# Patient Record
Sex: MALE | Race: WHITE | NOT HISPANIC OR LATINO | Employment: FULL TIME | URBAN - METROPOLITAN AREA
[De-identification: names, ages, dates, MRNs, and addresses within clinical notes are randomized per-mention and may not be internally consistent; named-entity substitution may affect disease eponyms.]

---

## 2017-01-10 ASSESSMENT — ENCOUNTER SYMPTOMS
BOWEL INCONTINENCE: 0
NIGHT SWEATS: 0
INSOMNIA: 1
DIARRHEA: 1
LOSS OF CONSCIOUSNESS: 0
BLOATING: 0
ARTHRALGIAS: 1
TINGLING: 1
JOINT SWELLING: 0
HEARTBURN: 0
NAIL CHANGES: 0
TREMORS: 1
NECK MASS: 0
DYSURIA: 0
DIFFICULTY URINATING: 0
POLYPHAGIA: 0
NUMBNESS: 1
MYALGIAS: 1
NAUSEA: 0
SKIN CHANGES: 0
SINUS CONGESTION: 1
POOR WOUND HEALING: 0
SINUS PAIN: 1
JAUNDICE: 0
WEIGHT GAIN: 0
VOMITING: 0
ABDOMINAL PAIN: 0
DECREASED APPETITE: 0
CONSTIPATION: 0
TASTE DISTURBANCE: 0
HEMATURIA: 0
CHILLS: 0
SEIZURES: 0
WEAKNESS: 1
RECTAL PAIN: 0
PARALYSIS: 0
MUSCLE WEAKNESS: 1
SMELL DISTURBANCE: 0
SORE THROAT: 0
DISTURBANCES IN COORDINATION: 1
DECREASED CONCENTRATION: 1
WEIGHT LOSS: 0
NERVOUS/ANXIOUS: 1
FLANK PAIN: 0
MEMORY LOSS: 0
DEPRESSION: 0
HEADACHES: 0
HALLUCINATIONS: 0
ALTERED TEMPERATURE REGULATION: 1
SPEECH CHANGE: 0
POLYDIPSIA: 0
PANIC: 0
DIZZINESS: 1
FEVER: 0
RECTAL BLEEDING: 0
BLOOD IN STOOL: 0
STIFFNESS: 1
BACK PAIN: 1
FATIGUE: 1
TROUBLE SWALLOWING: 0
INCREASED ENERGY: 0
HOARSE VOICE: 1
MUSCLE CRAMPS: 0

## 2017-01-16 ENCOUNTER — RESULTS ONLY (OUTPATIENT)
Dept: OTHER | Facility: CLINIC | Age: 47
End: 2017-01-16

## 2017-01-16 ENCOUNTER — APPOINTMENT (OUTPATIENT)
Dept: LAB | Facility: CLINIC | Age: 47
End: 2017-01-16
Attending: TRANSPLANT SURGERY
Payer: COMMERCIAL

## 2017-01-16 DIAGNOSIS — Z94.4 LIVER REPLACED BY TRANSPLANT (H): ICD-10-CM

## 2017-01-16 DIAGNOSIS — Z79.899 HIGH RISK MEDICATIONS (NOT ANTICOAGULANTS) LONG-TERM USE: ICD-10-CM

## 2017-01-16 DIAGNOSIS — Z13.220 LIPID SCREENING: ICD-10-CM

## 2017-01-16 DIAGNOSIS — D84.9 IMMUNOSUPPRESSED STATUS (H): ICD-10-CM

## 2017-01-16 DIAGNOSIS — Z94.0 KIDNEY REPLACED BY TRANSPLANT: ICD-10-CM

## 2017-01-16 LAB
ALBUMIN UR-MCNC: NEGATIVE MG/DL
APPEARANCE UR: CLEAR
BILIRUB UR QL STRIP: NEGATIVE
CHOLEST SERPL-MCNC: 190 MG/DL
COLOR UR AUTO: YELLOW
CREAT UR-MCNC: 131 MG/DL
GLUCOSE UR STRIP-MCNC: NEGATIVE MG/DL
HDLC SERPL-MCNC: 52 MG/DL
HGB UR QL STRIP: NEGATIVE
KETONES UR STRIP-MCNC: NEGATIVE MG/DL
LDLC SERPL CALC-MCNC: 125 MG/DL
LEUKOCYTE ESTERASE UR QL STRIP: NEGATIVE
MUCOUS THREADS #/AREA URNS LPF: PRESENT /LPF
NITRATE UR QL: NEGATIVE
NONHDLC SERPL-MCNC: 138 MG/DL
PH UR STRIP: 5 PH (ref 5–7)
PROT UR-MCNC: 0.14 G/L
PROT/CREAT 24H UR: 0.11 G/G CR (ref 0–0.2)
RBC #/AREA URNS AUTO: <1 /HPF (ref 0–2)
SP GR UR STRIP: 1.02 (ref 1–1.03)
TRIGL SERPL-MCNC: 66 MG/DL
URN SPEC COLLECT METH UR: ABNORMAL
UROBILINOGEN UR STRIP-MCNC: 0 MG/DL (ref 0–2)
WBC #/AREA URNS AUTO: 1 /HPF (ref 0–2)

## 2017-01-16 PROCEDURE — 86833 HLA CLASS II HIGH DEFIN QUAL: CPT | Performed by: INTERNAL MEDICINE

## 2017-01-16 PROCEDURE — 86832 HLA CLASS I HIGH DEFIN QUAL: CPT | Performed by: INTERNAL MEDICINE

## 2017-01-17 ENCOUNTER — OFFICE VISIT (OUTPATIENT)
Dept: NEPHROLOGY | Facility: CLINIC | Age: 47
End: 2017-01-17
Attending: INTERNAL MEDICINE
Payer: COMMERCIAL

## 2017-01-17 VITALS
SYSTOLIC BLOOD PRESSURE: 129 MMHG | OXYGEN SATURATION: 97 % | TEMPERATURE: 98.9 F | WEIGHT: 257 LBS | HEART RATE: 62 BPM | BODY MASS INDEX: 34.85 KG/M2 | DIASTOLIC BLOOD PRESSURE: 83 MMHG

## 2017-01-17 DIAGNOSIS — D84.9 IMMUNOSUPPRESSED STATUS (H): ICD-10-CM

## 2017-01-17 DIAGNOSIS — I15.1 HYPERTENSION SECONDARY TO OTHER RENAL DISORDERS: ICD-10-CM

## 2017-01-17 DIAGNOSIS — Z94.0 KIDNEY REPLACED BY TRANSPLANT: ICD-10-CM

## 2017-01-17 DIAGNOSIS — Z94.4 LIVER REPLACED BY TRANSPLANT (H): ICD-10-CM

## 2017-01-17 DIAGNOSIS — N25.81 SECONDARY RENAL HYPERPARATHYROIDISM (H): Primary | ICD-10-CM

## 2017-01-17 LAB — PRA DONOR SPECIFIC ABY: NORMAL

## 2017-01-17 PROCEDURE — 99213 OFFICE O/P EST LOW 20 MIN: CPT | Mod: ZF

## 2017-01-17 ASSESSMENT — PAIN SCALES - GENERAL: PAINLEVEL: NO PAIN (0)

## 2017-01-17 NOTE — NURSING NOTE
Chief Complaint   Patient presents with     RECHECK     follow up       Initial /83 mmHg  Pulse 62  Temp(Src) 98.9  F (37.2  C) (Oral)  Wt 116.574 kg (257 lb)  SpO2 97% Estimated body mass index is 34.85 kg/(m^2) as calculated from the following:    Height as of 6/3/16: 1.829 m (6').    Weight as of this encounter: 116.574 kg (257 lb).  BP completed using cuff size: trinh SWEENEY CMA

## 2017-01-17 NOTE — MR AVS SNAPSHOT
After Visit Summary   1/17/2017    Jarod Obregon    MRN: [id number]           Patient Information     Date Of Birth          1970        Visit Information        Provider Department      1/17/2017 2:50 PM Jalen Emerson MD [facility] Nephrology         Follow-ups after your visit        Your next 10 appointments already scheduled     Jan 27, 2017  9:10 AM   (Arrive by 8:55 AM)   Return Liver Transplant with Jeannine Biggs MD   [facility] Hepatology ([facility])    [address] [place] [id number] [phone]            John 15, 2018  2:50 PM   (Arrive by 2:20 PM)   Return Kidney Transplant with Jalen Emerson MD   [facility] Nephrology ([facility])    [address] [place] [id number] [phone]              Who to contact     If you have questions or need follow up information about today's clinic visit or your schedule please contact [facility] NEPHROLOGY directly at [phone].  Normal or non-critical lab and imaging results will be communicated to you by [employer], letter or phone within 4 business days after the clinic has received the results. If you do not hear from us within 7 days, please contact the clinic through [employer] or phone. If you have a critical or abnormal lab result, we will notify you by phone as soon as possible.  Submit refill requests through [employer] or call your pharmacy and they will forward the refill request to us. Please allow 3 business days for your refill to be completed.          Additional Information About Your Visit        [employer] Information     [employer] gives you secure access to your electronic health record. If you see a primary care provider, you can also send messages to your care team and make appointments. If you have questions, please call your primary care clinic.  If you do not have a primary care provider, please call  [phone] and they will assist you.        Care EveryWhere ID     This is your Care EveryWhere ID. This could be used by other organizations to access your [place] medical records  [id number]        Your Vitals Were     Pulse Temperature Pulse Oximetry             62 98.9  F (37.2  C) (Oral) 97%          Blood Pressure from Last 3 Encounters:   01/17/17 129/83   06/03/16 142/80   01/27/16 134/86    Weight from Last 3 Encounters:   01/17/17 116.574 kg (257 lb)   06/03/16 114.76 kg (253 lb)   01/27/16 115.577 kg (254 lb 12.8 oz)              Today, you had the following     No orders found for display       Primary Care Provider Office Phone # Fax #    Carrington Gil [phone]       [facility] [address] [place] [address]        Thank you!     Thank you for choosing [facility] NEPHROLOGY  for your care. Our goal is always to provide you with excellent care. Hearing back from our patients is one way we can continue to improve our services. Please take a few minutes to complete the written survey that you may receive in the mail after your visit with us. Thank you!             Your Updated Medication List - Protect others around you: Learn how to safely use, store and throw away your medicines at www.disposemymeds.org.          This list is accurate as of: 1/17/17  3:07 PM.  Always use your most recent med list.                   Brand Name Dispense Instructions for use    amLODIPine 5 MG tablet    NORVASC     Take 5 mg by mouth daily       aspirin 81 MG chewable tablet     90 tablet    Take 1 tablet by mouth daily.       gabapentin 100 MG capsule    NEURONTIN    810 capsule    Take 3 capsules (300 mg) by mouth 3 times daily       metoprolol 50 MG tablet    LOPRESSOR     Take 50 mg by mouth 2 times daily       * mycophenolate 250 MG capsule    CELLCEPT - GENERIC EQUIVALENT    180 capsule    Take 1 capsule (250 mg) by mouth 2 times daily       * mycophenolate 500 MG  tablet    CELLCEPT - GENERIC EQUIVALENT    180 tablet    Take 1 tablet (500 mg) by mouth 2 times daily       sildenafil 50 MG cap/tab    REVATIO/VIAGRA    12 tablet    Take 0.5-1 tablets (25-50 mg) by mouth daily as needed for erectile dysfunction Take 30 min to 4 hours before intercourse.  Never use with nitroglycerin, terazosin or doxazosin.       sirolimus 0.5 MG tablet    RAPAMUNE - GENERIC EQUIVALENT    270 tablet    Take 1.5 mg once daily       * Notice:  This list has 2 medication(s) that are the same as other medications prescribed for you. Read the directions carefully, and ask your doctor or other care provider to review them with you.

## 2017-01-17 NOTE — PROGRESS NOTES
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     1. SLK complicated by DGF; Stable graft function. I will continue to monitor  2. HTN -BP controlled on current regimen   3. Hyperlipidemia - reviewed cholesterol levels, LDL elevated    4. Weight gain - weight stabilized; monitor, recommend weight loss  5. Renal Osteodystrophy: will need to check PTH and vitamin D at some point       Reason for Visit:  Mr. Cherelle Quesada is here for follow up and immunosuppression management.    HPI:   Jarod Quesada is a 45 year old year old male with ESKD from HRS type 1 and is status post simultaneous liver and kidney transplant (SLK) on 1/14/13.  He enjoys stable kidney and liver function. He has not had any rejection episodes. Switched to Rapamune from Prograf due to neuropathy and felling \"foggy\".   He tolerates IS well, although occasionally has some loose stools. BP well controlled. Denies nausea, voomiting or diarrhea. Seen Dr Hassan for possible hernia repair, but is not sure whether he wants to go through it.            Transplant Hx:       Tx: liver and Kidney transplant  Date: 1/14/13       Present Maintenance IS: Tacrolimus and Mycophenolate mofetil       Baseline Creatinine: 1.2 mg/dl      ROS:   A comprehensive review of systems was obtained and negative, except as noted in the HPI or PMH.    Active Medical Problems:  Patient Active Problem List   Diagnosis     Generalized anxiety disorder     Kidney replaced by transplant     Liver replaced by transplant (H)     High risk medications (not anticoagulants) long-term use     Immunosuppressed status (H)     Hypertension     Anemia in chronic renal disease     Secondary renal hyperparathyroidism (H)     Personal Hx:  Social History     Social History     Marital Status:      Spouse Name: N/A "     Number of Children: N/A     Years of Education: N/A     Occupational History     Not on file.     Social History Main Topics     Smoking status: Never Smoker      Smokeless tobacco: Never Used     Alcohol Use: No      Comment: Occasional, rare     Drug Use: No     Sexual Activity:     Partners: Female     Other Topics Concern     Not on file     Social History Narrative     Allergies:  Allergies   Allergen Reactions     Paxil [Paroxetine] Other (See Comments)     Caused Severe Tremor     Chlorhexidine Rash     Phenol Rash     Medications:  Current Outpatient Prescriptions   Medication     sirolimus (RAPAMUNE - GENERIC EQUIVALENT) 0.5 MG tablet     mycophenolate (CELLCEPT - GENERIC EQUIVALENT) 250 MG capsule     mycophenolate (CELLCEPT - GENERIC EQUIVALENT) 500 MG tablet     amLODIPine (NORVASC) 5 MG tablet     metoprolol (LOPRESSOR) 50 MG tablet     sildenafil (VIAGRA) 50 MG tablet     gabapentin (NEURONTIN) 100 MG capsule     aspirin 81 MG chewable tablet     No current facility-administered medications for this visit.     Vitals:  /83 mmHg  Pulse 62  Temp(Src) 98.9  F (37.2  C) (Oral)  Wt 116.574 kg (257 lb)  SpO2 97%      Exam:   GENERAL APPEARANCE: alert and no distress  HENT: mouth without ulcers or lesions  LYMPHATICS: no axillary, cervical, inguinal, or supraclavicular nodes  RESP: lungs clear to auscultation - no rales, rhonchi or wheezes  CV: regular rhythm, normal rate, no rub, no murmur  EDEMA:trace LE edema bilaterally, improving  ABDOMEN:  soft, nontender, MS: extremities normal- no gross deformities noted, no evidence of inflammation in joints, no muscle tenderness  SKIN: no rash    Results:   Recent Results (from the past 168 hour(s))   Lipid Profile    Collection Time: 01/16/17  7:48 AM   Result Value Ref Range    Cholesterol 190 <200 mg/dL    Triglycerides 66 <150 mg/dL    HDL Cholesterol 52 >39 mg/dL    LDL Cholesterol Calculated 125 (H) <100 mg/dL    Non HDL Cholesterol 138 (H) <130  mg/dL   PRA Donor Specific Antibody    Collection Time: 01/16/17  7:48 AM   Result Value Ref Range    PRA Donor Specific Dina       Specimen received - Immunology report to follow upon completion.   Protein  random urine    Collection Time: 01/16/17  7:51 AM   Result Value Ref Range    Protein Random Urine 0.14 g/L    Protein Total Urine g/gr Creatinine 0.11 0 - 0.2 g/g Cr   UA with Microscopic reflex to Culture    Collection Time: 01/16/17  7:51 AM   Result Value Ref Range    Color Urine Yellow     Appearance Urine Clear     Glucose Urine Negative NEG mg/dL    Bilirubin Urine Negative NEG    Ketones Urine Negative NEG mg/dL    Specific Gravity Urine 1.016 1.003 - 1.035    Blood Urine Negative NEG    pH Urine 5.0 5.0 - 7.0 pH    Protein Albumin Urine Negative NEG mg/dL    Urobilinogen mg/dL 0.0 0.0 - 2.0 mg/dL    Nitrite Urine Negative NEG    Leukocyte Esterase Urine Negative NEG    Source Midstream Urine     WBC Urine 1 0 - 2 /HPF    RBC Urine <1 0 - 2 /HPF    Mucous Urine Present (A) NEG /LPF   Creatinine urine calculation only    Collection Time: 01/16/17  7:51 AM   Result Value Ref Range    Creatinine Urine 131 mg/dL

## 2017-01-17 NOTE — Clinical Note
"1/17/2017       RE: Jarod Obregon  [address] [facility] [address] [facility] [address] [id number]     Dear Colleague,    Thank you for referring your patient, Jarod Obregon, to the [facility] NEPHROLOGY at [facility]. Please see a copy of my visit note below.    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     1. SLK complicated by DGF; Stable graft function. I will continue to monitor  2. HTN -BP controlled on current regimen   3. Hyperlipidemia - reviewed cholesterol levels, LDL elevated    4. Weight gain - weight stabilized; monitor, recommend weight loss  5. Renal Osteodystrophy: will need to check PTH and vitamin D at some point       Reason for Visit:  Mr. Cherelle Quesada is here for follow up and immunosuppression management.    HPI:   Jarod Quesada is a 45 year old year old male with ESKD from HRS type 1 and is status post simultaneous liver and kidney transplant (SLK) on 1/14/13.  He enjoys stable kidney and liver function. He has not had any rejection episodes. Switched to Rapamune from Prograf due to neuropathy and felling \"foggy\".   He tolerates IS well, although occasionally has some loose stools. BP well controlled. Denies nausea, voomiting or diarrhea. Seen Dr Hassan for possible hernia repair, but is not sure whether he wants to go through it.            Transplant Hx:       Tx: liver and Kidney transplant  Date: 1/14/13       Present Maintenance IS: Tacrolimus and Mycophenolate mofetil       Baseline Creatinine: 1.2 mg/dl      ROS:   A comprehensive review of systems was obtained and negative, except as noted in the HPI or PMH.    Active Medical Problems:  Patient Active Problem List   Diagnosis     Generalized anxiety disorder     Kidney replaced by transplant     Liver replaced by transplant (H) "     High risk medications (not anticoagulants) long-term use     Immunosuppressed status (H)     Hypertension     Anemia in chronic renal disease     Secondary renal hyperparathyroidism (H)     Personal Hx:  Social History     Social History     Marital Status:      Spouse Name: N/A     Number of Children: N/A     Years of Education: N/A     Occupational History     Not on file.     Social History Main Topics     Smoking status: Never Smoker      Smokeless tobacco: Never Used     Alcohol Use: No      Comment: Occasional, rare     Drug Use: No     Sexual Activity:     Partners: Female     Other Topics Concern     Not on file     Social History Narrative     Allergies:  Allergies   Allergen Reactions     Paxil [Paroxetine] Other (See Comments)     Caused Severe Tremor     Chlorhexidine Rash     Phenol Rash     Medications:  Current Outpatient Prescriptions   Medication     sirolimus (RAPAMUNE - GENERIC EQUIVALENT) 0.5 MG tablet     mycophenolate (CELLCEPT - GENERIC EQUIVALENT) 250 MG capsule     mycophenolate (CELLCEPT - GENERIC EQUIVALENT) 500 MG tablet     amLODIPine (NORVASC) 5 MG tablet     metoprolol (LOPRESSOR) 50 MG tablet     sildenafil (VIAGRA) 50 MG tablet     gabapentin (NEURONTIN) 100 MG capsule     aspirin 81 MG chewable tablet     No current facility-administered medications for this visit.     Vitals:  /83 mmHg  Pulse 62  Temp(Src) 98.9  F (37.2  C) (Oral)  Wt 116.574 kg (257 lb)  SpO2 97%      Exam:   GENERAL APPEARANCE: alert and no distress  HENT: mouth without ulcers or lesions  LYMPHATICS: no axillary, cervical, inguinal, or supraclavicular nodes  RESP: lungs clear to auscultation - no rales, rhonchi or wheezes  CV: regular rhythm, normal rate, no rub, no murmur  EDEMA:trace LE edema bilaterally, improving  ABDOMEN:  soft, nontender, MS: extremities normal- no gross deformities noted, no evidence of inflammation in joints, no muscle tenderness  SKIN: no rash    Results:   Recent  Results (from the past 168 hour(s))   Lipid Profile    Collection Time: 01/16/17  7:48 AM   Result Value Ref Range    Cholesterol 190 <200 mg/dL    Triglycerides 66 <150 mg/dL    HDL Cholesterol 52 >39 mg/dL    LDL Cholesterol Calculated 125 (H) <100 mg/dL    Non HDL Cholesterol 138 (H) <130 mg/dL   PRA Donor Specific Antibody    Collection Time: 01/16/17  7:48 AM   Result Value Ref Range    PRA Donor Specific Dina       Specimen received - Immunology report to follow upon completion.   Protein  random urine    Collection Time: 01/16/17  7:51 AM   Result Value Ref Range    Protein Random Urine 0.14 g/L    Protein Total Urine g/gr Creatinine 0.11 0 - 0.2 g/g Cr   UA with Microscopic reflex to Culture    Collection Time: 01/16/17  7:51 AM   Result Value Ref Range    Color Urine Yellow     Appearance Urine Clear     Glucose Urine Negative NEG mg/dL    Bilirubin Urine Negative NEG    Ketones Urine Negative NEG mg/dL    Specific Gravity Urine 1.016 1.003 - 1.035    Blood Urine Negative NEG    pH Urine 5.0 5.0 - 7.0 pH    Protein Albumin Urine Negative NEG mg/dL    Urobilinogen mg/dL 0.0 0.0 - 2.0 mg/dL    Nitrite Urine Negative NEG    Leukocyte Esterase Urine Negative NEG    Source Midstream Urine     WBC Urine 1 0 - 2 /HPF    RBC Urine <1 0 - 2 /HPF    Mucous Urine Present (A) NEG /LPF   Creatinine urine calculation only    Collection Time: 01/16/17  7:51 AM   Result Value Ref Range    Creatinine Urine 131 mg/dL       Again, thank you for allowing me to participate in the care of your patient.      Sincerely,    Jalen Emerson MD

## 2017-01-20 LAB
DONOR IDENTIFICATION: NORMAL
DSA COMMENTS: NORMAL
DSA PRESENT: NO
DSA TEST METHOD: NORMAL
ORGAN: NORMAL
SA1 CELL: NORMAL
SA1 COMMENTS: NORMAL
SA1 HI RISK ABY: NORMAL
SA1 MOD RISK ABY: NORMAL
SA1 TEST METHOD: NORMAL
SA2 CELL: NORMAL
SA2 COMMENTS: NORMAL
SA2 HI RISK ABY UA: NORMAL
SA2 MOD RISK ABY: NORMAL
SA2 TEST METHOD: NORMAL

## 2017-01-22 PROBLEM — Z48.298 AFTERCARE FOLLOWING ORGAN TRANSPLANT: Status: ACTIVE | Noted: 2017-01-22

## 2017-01-27 ENCOUNTER — OFFICE VISIT (OUTPATIENT)
Dept: GASTROENTEROLOGY | Facility: CLINIC | Age: 47
End: 2017-01-27
Attending: INTERNAL MEDICINE
Payer: COMMERCIAL

## 2017-01-27 VITALS
BODY MASS INDEX: 34.38 KG/M2 | TEMPERATURE: 98.8 F | WEIGHT: 253.8 LBS | SYSTOLIC BLOOD PRESSURE: 133 MMHG | HEART RATE: 65 BPM | HEIGHT: 72 IN | OXYGEN SATURATION: 96 % | DIASTOLIC BLOOD PRESSURE: 95 MMHG

## 2017-01-27 DIAGNOSIS — Z94.4 LIVER REPLACED BY TRANSPLANT (H): Primary | ICD-10-CM

## 2017-01-27 DIAGNOSIS — D84.9 IMMUNOSUPPRESSED STATUS (H): ICD-10-CM

## 2017-01-27 PROCEDURE — 99212 OFFICE O/P EST SF 10 MIN: CPT | Mod: ZF

## 2017-01-27 ASSESSMENT — PAIN SCALES - GENERAL: PAINLEVEL: NO PAIN (0)

## 2017-01-27 NOTE — Clinical Note
1/27/2017      RE: Jarod Villarreal [place]  [address] [place] [address]       SUBJECTIVE:  Jarod is a 46-year-old man status post liver and kidney transplant in 2013 for alcoholic liver disease.      He is maintained on Sirolimus and mycophenolate.  Kidney and liver function are both good.  Kidney function is slightly compromised with stable creatinine of 1.3.  He has normal liver tests.      Overall, he continues to do extremely well.  He continues to struggle with his weight and has not lost any weight.  He does walk regularly.      He has a ventral hernia and is wondering when it should get fixed.  He is reluctant to have this fixed but said that if it needs to be then he would undergo the procedure.  He does wear a support belt when he is doing anything that requires abdominal pressure.      He is up-to-date on his screening.  He had a colonoscopy in about 2012 elsewhere.  This was done for anemia at that time.  He has seen Dermatology within the last year and has a followup appointment with them in February.      He continues to struggle with neuropathy and actually has had a couple of falls.  He is on gabapentin and it helps for that.      SOCIAL HISTORY:  Kids are well.  He is here with his wife.  It sounds like he is working on Disability.      REVIEW OF SYSTEMS:  Comprehensive review of systems is otherwise negative.      PHYSICAL EXAMINATION:   VITAL SIGNS:  Blood pressure 133/95, temperature 98.8, pulse 65, O2 sats 96%, weight is 253.   GENERAL:  Pleasant, well-appearing, in no acute distress.   HEENT:  No icterus, no oral lesions.   LYMPH:  No supraclavicular or cervical lymphadenopathy.   CARDIOVASCULAR:  Regular rate and rhythm.   CHEST:  Lungs are clear.   ABDOMEN:  Bowel sounds are present.  Abdomen is soft, nontender, nondistended.  He does have a ventral hernia that is about 8 cm.      LABORATORY DATA:  Reviewed with him.      ASSESSMENT AND PLAN:  A 46-year-old man status post liver and  kidney transplant in 2013 for alcoholic liver disease.  Her kidney function is stable.  He sees Dr. Jalen Emerson.  He is on immunosuppression and tolerating it well.  No changes in his immunosuppression.        He is up-to-date on cancer screening.      We talked about fixing the hernia.  I again asked him to focus on weight loss as his primary goal initially.  We will plan to see him back in 1 year.       Jeannine Biggs MD

## 2017-01-27 NOTE — PROGRESS NOTES
SUBJECTIVE:  Jarod is a 46-year-old man status post liver and kidney transplant in 2013 for alcoholic liver disease.      He is maintained on Sirolimus and mycophenolate.  Kidney and liver function are both good.  Kidney function is slightly compromised with stable creatinine of 1.3.  He has normal liver tests.      Overall, he continues to do extremely well.  He continues to struggle with his weight and has not lost any weight.  He does walk regularly.      He has a ventral hernia and is wondering when it should get fixed.  He is reluctant to have this fixed but said that if it needs to be then he would undergo the procedure.  He does wear a support belt when he is doing anything that requires abdominal pressure.      He is up-to-date on his screening.  He had a colonoscopy in about 2012 elsewhere.  This was done for anemia at that time.  He has seen Dermatology within the last year and has a followup appointment with them in February.      He continues to struggle with neuropathy and actually has had a couple of falls.  He is on gabapentin and it helps for that.      SOCIAL HISTORY:  Kids are well.  He is here with his wife.  It sounds like he is working on Disability.      REVIEW OF SYSTEMS:  Comprehensive review of systems is otherwise negative.      PHYSICAL EXAMINATION:   VITAL SIGNS:  Blood pressure 133/95, temperature 98.8, pulse 65, O2 sats 96%, weight is 253.   GENERAL:  Pleasant, well-appearing, in no acute distress.   HEENT:  No icterus, no oral lesions.   LYMPH:  No supraclavicular or cervical lymphadenopathy.   CARDIOVASCULAR:  Regular rate and rhythm.   CHEST:  Lungs are clear.   ABDOMEN:  Bowel sounds are present.  Abdomen is soft, nontender, nondistended.  He does have a ventral hernia that is about 8 cm.      LABORATORY DATA:  Reviewed with him.      ASSESSMENT AND PLAN:  A 46-year-old man status post liver and kidney transplant in 2013 for alcoholic liver disease.  Her kidney function is stable.  He  sees Dr. Jalen Emerson.  He is on immunosuppression and tolerating it well.  No changes in his immunosuppression.        He is up-to-date on cancer screening.      We talked about fixing the hernia.  I again asked him to focus on weight loss as his primary goal initially.  We will plan to see him back in 1 year.

## 2017-01-27 NOTE — MR AVS SNAPSHOT
After Visit Summary   1/27/2017    Jarod Obregon    MRN: [id number]           Patient Information     Date Of Birth          1970        Visit Information        Provider Department      1/27/2017 9:10 AM Jeannine Biggs MD [facility] Hepatology         Follow-ups after your visit        Your next 10 appointments already scheduled     John 15, 2018  2:50 PM   (Arrive by 2:20 PM)   Return Kidney Transplant with Jalen Emerson MD   [facility] Nephrology ([facility])    [address] [place] [id number] [phone]            Jan 26, 2018  8:50 AM   (Arrive by 8:35 AM)   Return Liver Transplant with Jeannine Biggs MD   [facility] Hepatology ([facility])    [address] [place] [id number] [phone]              Who to contact     If you have questions or need follow up information about today's clinic visit or your schedule please contact [facility] HEPATOLOGY directly at [phone].  Normal or non-critical lab and imaging results will be communicated to you by Arcarishart, letter or phone within 4 business days after the clinic has received the results. If you do not hear from us within 7 days, please contact the clinic through iGluet or phone. If you have a critical or abnormal lab result, we will notify you by phone as soon as possible.  Submit refill requests through [employer] or call your pharmacy and they will forward the refill request to us. Please allow 3 business days for your refill to be completed.          Additional Information About Your Visit        Arcarishart Information     [employer] gives you secure access to your electronic health record. If you see a primary care provider, you can also send messages to your care team and make appointments. If you have questions, please call your primary care clinic.  If you do not have a primary care provider, please  call [phone] and they will assist you.        Care EveryWhere ID     This is your Care EveryWhere ID. This could be used by other organizations to access your [place] medical records  [id number]        Your Vitals Were     Pulse Temperature Height BMI (Body Mass Index) Pulse Oximetry       65 98.8  F (37.1  C) (Oral) 1.829 m (6') 34.41 kg/m2 96%        Blood Pressure from Last 3 Encounters:   01/27/17 133/95   01/17/17 129/83   06/03/16 142/80    Weight from Last 3 Encounters:   01/27/17 115.123 kg (253 lb 12.8 oz)   01/17/17 116.574 kg (257 lb)   06/03/16 114.76 kg (253 lb)              Today, you had the following     No orders found for display       Primary Care Provider Office Phone # Fax #    Carrington Gil [phone]       [facility] [address] [place] [address]        Thank you!     Thank you for choosing [facility] HEPATOLOGY  for your care. Our goal is always to provide you with excellent care. Hearing back from our patients is one way we can continue to improve our services. Please take a few minutes to complete the written survey that you may receive in the mail after your visit with us. Thank you!             Your Updated Medication List - Protect others around you: Learn how to safely use, store and throw away your medicines at www.disposemymeds.org.          This list is accurate as of: 1/27/17 10:00 AM.  Always use your most recent med list.                   Brand Name Dispense Instructions for use    amLODIPine 5 MG tablet    NORVASC     Take 5 mg by mouth daily       aspirin 81 MG chewable tablet     90 tablet    Take 1 tablet by mouth daily.       gabapentin 100 MG capsule    NEURONTIN    810 capsule    Take 3 capsules (300 mg) by mouth 3 times daily       metoprolol 50 MG tablet    LOPRESSOR     Take 50 mg by mouth 2 times daily       * mycophenolate 250 MG capsule    CELLCEPT - GENERIC EQUIVALENT    180 capsule    Take 1 capsule (250 mg) by  mouth 2 times daily       * mycophenolate 500 MG tablet    CELLCEPT - GENERIC EQUIVALENT    180 tablet    Take 1 tablet (500 mg) by mouth 2 times daily       sildenafil 50 MG cap/tab    REVATIO/VIAGRA    12 tablet    Take 0.5-1 tablets (25-50 mg) by mouth daily as needed for erectile dysfunction Take 30 min to 4 hours before intercourse.  Never use with nitroglycerin, terazosin or doxazosin.       sirolimus 0.5 MG tablet    RAPAMUNE - GENERIC EQUIVALENT    270 tablet    Take 1.5 mg once daily       * Notice:  This list has 2 medication(s) that are the same as other medications prescribed for you. Read the directions carefully, and ask your doctor or other care provider to review them with you.

## 2017-01-27 NOTE — NURSING NOTE
Chief Complaint   Patient presents with     RECHECK     Post Liver Txp   Pt roomed, vitals, meds, and allergies reviewed with pt. Pt ready for provider.  John Johnson, CMA

## 2017-04-13 ENCOUNTER — DOCUMENTATION ONLY (OUTPATIENT)
Dept: TRANSPLANT | Facility: CLINIC | Age: 47
End: 2017-04-13

## 2017-05-15 DIAGNOSIS — Z94.0 KIDNEY REPLACED BY TRANSPLANT: ICD-10-CM

## 2017-05-15 DIAGNOSIS — Z94.4 LIVER REPLACED BY TRANSPLANT (H): ICD-10-CM

## 2017-05-15 DIAGNOSIS — D84.9 IMMUNOSUPPRESSED STATUS (H): ICD-10-CM

## 2017-05-15 DIAGNOSIS — Z79.899 HIGH RISK MEDICATIONS (NOT ANTICOAGULANTS) LONG-TERM USE: ICD-10-CM

## 2017-05-15 LAB
ALBUMIN SERPL-MCNC: 3.5 G/DL (ref 3.4–5)
ALP SERPL-CCNC: 66 U/L (ref 40–150)
ALT SERPL W P-5'-P-CCNC: 28 U/L (ref 0–70)
ANION GAP SERPL CALCULATED.3IONS-SCNC: 7 MMOL/L (ref 3–14)
AST SERPL W P-5'-P-CCNC: 23 U/L (ref 0–45)
BILIRUB DIRECT SERPL-MCNC: 0.2 MG/DL (ref 0–0.2)
BILIRUB SERPL-MCNC: 0.8 MG/DL (ref 0.2–1.3)
BUN SERPL-MCNC: 20 MG/DL (ref 7–30)
CALCIUM SERPL-MCNC: 8.6 MG/DL (ref 8.5–10.1)
CHLORIDE SERPL-SCNC: 108 MMOL/L (ref 94–109)
CO2 SERPL-SCNC: 27 MMOL/L (ref 20–32)
CREAT SERPL-MCNC: 1.17 MG/DL (ref 0.66–1.25)
ERYTHROCYTE [DISTWIDTH] IN BLOOD BY AUTOMATED COUNT: 13.2 % (ref 10–15)
GFR SERPL CREATININE-BSD FRML MDRD: 67 ML/MIN/1.7M2
GLUCOSE SERPL-MCNC: 88 MG/DL (ref 70–99)
HCT VFR BLD AUTO: 45.2 % (ref 40–53)
HGB BLD-MCNC: 14.8 G/DL (ref 13.3–17.7)
MCH RBC QN AUTO: 26.6 PG (ref 26.5–33)
MCHC RBC AUTO-ENTMCNC: 32.7 G/DL (ref 31.5–36.5)
MCV RBC AUTO: 81 FL (ref 78–100)
PLATELET # BLD AUTO: 210 10E9/L (ref 150–450)
POTASSIUM SERPL-SCNC: 4.2 MMOL/L (ref 3.4–5.3)
PROT SERPL-MCNC: 6.7 G/DL (ref 6.8–8.8)
RBC # BLD AUTO: 5.57 10E12/L (ref 4.4–5.9)
SIROLIMUS BLD-MCNC: 6.8 UG/L (ref 5–15)
SODIUM SERPL-SCNC: 142 MMOL/L (ref 133–144)
TME LAST DOSE: NORMAL H
WBC # BLD AUTO: 5.2 10E9/L (ref 4–11)

## 2017-05-16 DIAGNOSIS — Z94.4 LIVER REPLACED BY TRANSPLANT (H): Primary | ICD-10-CM

## 2017-05-16 LAB
MYCOPHENOLATE SERPL LC/MS/MS-MCNC: 1.2 MG/L (ref 1–3.5)
MYCOPHENOLATE-G SERPL LC/MS/MS-MCNC: 51.8 MG/L (ref 30–95)
TME LAST DOSE: NORMAL H

## 2017-06-14 DIAGNOSIS — Z94.4 LIVER REPLACED BY TRANSPLANT (H): ICD-10-CM

## 2017-06-14 DIAGNOSIS — Z94.9 TRANSPLANT: ICD-10-CM

## 2017-06-14 NOTE — TELEPHONE ENCOUNTER
Drug: Mycophenolate 250mg  Last Fill Date: 3/23/2017  Quantity: 180          Drug: Sirolimus  Last Fill Date: 3/23/2017  Quantity: 270            Drug: Mycophenolate 500  Last Fill Date: 3/23/2017  Quantity: 180

## 2017-06-16 RX ORDER — MYCOPHENOLATE MOFETIL 500 MG/1
500 TABLET ORAL 2 TIMES DAILY
Qty: 180 TABLET | Refills: 3 | Status: SHIPPED | OUTPATIENT
Start: 2017-06-16 | End: 2018-01-23

## 2017-06-16 RX ORDER — SIROLIMUS 0.5 MG/1
1.5 TABLET, FILM COATED ORAL DAILY
Qty: 270 TABLET | Refills: 3 | Status: SHIPPED | OUTPATIENT
Start: 2017-06-16 | End: 2018-01-08

## 2017-06-16 RX ORDER — MYCOPHENOLATE MOFETIL 250 MG/1
250 CAPSULE ORAL 2 TIMES DAILY
Qty: 180 CAPSULE | Refills: 3 | Status: SHIPPED | OUTPATIENT
Start: 2017-06-16 | End: 2018-01-23

## 2017-10-10 ENCOUNTER — RESULTS ONLY (OUTPATIENT)
Dept: OTHER | Facility: CLINIC | Age: 47
End: 2017-10-10

## 2017-10-10 DIAGNOSIS — Z94.0 KIDNEY REPLACED BY TRANSPLANT: ICD-10-CM

## 2017-10-10 DIAGNOSIS — D84.9 IMMUNOSUPPRESSED STATUS (H): ICD-10-CM

## 2017-10-10 DIAGNOSIS — Z94.4 LIVER REPLACED BY TRANSPLANT (H): ICD-10-CM

## 2017-10-10 DIAGNOSIS — Z79.899 HIGH RISK MEDICATIONS (NOT ANTICOAGULANTS) LONG-TERM USE: ICD-10-CM

## 2017-10-10 LAB
ALBUMIN SERPL-MCNC: 3.6 G/DL (ref 3.4–5)
ALBUMIN UR-MCNC: NEGATIVE MG/DL
ALP SERPL-CCNC: 68 U/L (ref 40–150)
ALT SERPL W P-5'-P-CCNC: 29 U/L (ref 0–70)
ANION GAP SERPL CALCULATED.3IONS-SCNC: 4 MMOL/L (ref 3–14)
APPEARANCE UR: CLEAR
AST SERPL W P-5'-P-CCNC: 17 U/L (ref 0–45)
BILIRUB DIRECT SERPL-MCNC: 0.2 MG/DL (ref 0–0.2)
BILIRUB SERPL-MCNC: 1 MG/DL (ref 0.2–1.3)
BILIRUB UR QL STRIP: NEGATIVE
BUN SERPL-MCNC: 16 MG/DL (ref 7–30)
CALCIUM SERPL-MCNC: 9 MG/DL (ref 8.5–10.1)
CHLORIDE SERPL-SCNC: 105 MMOL/L (ref 94–109)
CHOLEST SERPL-MCNC: 176 MG/DL
CO2 SERPL-SCNC: 28 MMOL/L (ref 20–32)
COLOR UR AUTO: ABNORMAL
CREAT SERPL-MCNC: 1.31 MG/DL (ref 0.66–1.25)
CREAT UR-MCNC: 56 MG/DL
ERYTHROCYTE [DISTWIDTH] IN BLOOD BY AUTOMATED COUNT: 13.1 % (ref 10–15)
GFR SERPL CREATININE-BSD FRML MDRD: 59 ML/MIN/1.7M2
GLUCOSE SERPL-MCNC: 85 MG/DL (ref 70–99)
GLUCOSE UR STRIP-MCNC: NEGATIVE MG/DL
HCT VFR BLD AUTO: 45 % (ref 40–53)
HDLC SERPL-MCNC: 45 MG/DL
HGB BLD-MCNC: 14.6 G/DL (ref 13.3–17.7)
HGB UR QL STRIP: NEGATIVE
KETONES UR STRIP-MCNC: NEGATIVE MG/DL
LDLC SERPL CALC-MCNC: 116 MG/DL
LEUKOCYTE ESTERASE UR QL STRIP: NEGATIVE
MCH RBC QN AUTO: 26.1 PG (ref 26.5–33)
MCHC RBC AUTO-ENTMCNC: 32.4 G/DL (ref 31.5–36.5)
MCV RBC AUTO: 81 FL (ref 78–100)
MUCOUS THREADS #/AREA URNS LPF: PRESENT /LPF
NITRATE UR QL: NEGATIVE
NONHDLC SERPL-MCNC: 132 MG/DL
PH UR STRIP: 6 PH (ref 5–7)
PLATELET # BLD AUTO: 177 10E9/L (ref 150–450)
POTASSIUM SERPL-SCNC: 4 MMOL/L (ref 3.4–5.3)
PROT SERPL-MCNC: 7 G/DL (ref 6.8–8.8)
PROT UR-MCNC: 0.05 G/L
PROT/CREAT 24H UR: 0.09 G/G CR (ref 0–0.2)
RBC # BLD AUTO: 5.59 10E12/L (ref 4.4–5.9)
RBC #/AREA URNS AUTO: <1 /HPF (ref 0–2)
SIROLIMUS BLD-MCNC: 7.1 UG/L (ref 5–15)
SODIUM SERPL-SCNC: 137 MMOL/L (ref 133–144)
SOURCE: ABNORMAL
SP GR UR STRIP: 1.01 (ref 1–1.03)
SQUAMOUS #/AREA URNS AUTO: <1 /HPF (ref 0–1)
TME LAST DOSE: NORMAL H
TRIGL SERPL-MCNC: 76 MG/DL
UROBILINOGEN UR STRIP-MCNC: 0 MG/DL (ref 0–2)
WBC # BLD AUTO: 5 10E9/L (ref 4–11)
WBC #/AREA URNS AUTO: 1 /HPF (ref 0–2)

## 2017-10-11 LAB — PRA DONOR SPECIFIC ABY: NORMAL

## 2017-12-11 ENCOUNTER — TELEPHONE (OUTPATIENT)
Dept: TRANSPLANT | Facility: CLINIC | Age: 47
End: 2017-12-11

## 2017-12-11 NOTE — TELEPHONE ENCOUNTER
"Spoke to Jarod.  He and his wife \"ate something bad\" on Thursday.  Both were throwing up.    Is able to drink and eat but stomach is sensitive.  Is not jaundiced, no fever.  Suggested he eat bland foods as tolerates, if this continues or he gets fever, unable to take or keep down meds should come to our ER.  If symptoms persist, make appt w/ PCP.  "

## 2018-01-08 DIAGNOSIS — Z94.4 LIVER REPLACED BY TRANSPLANT (H): ICD-10-CM

## 2018-01-08 RX ORDER — SIROLIMUS 0.5 MG/1
1.5 TABLET, FILM COATED ORAL DAILY
Qty: 270 TABLET | Refills: 3 | Status: SHIPPED | OUTPATIENT
Start: 2018-01-08 | End: 2018-01-12

## 2018-01-10 ENCOUNTER — TELEPHONE (OUTPATIENT)
Dept: NEPHROLOGY | Facility: CLINIC | Age: 48
End: 2018-01-10

## 2018-01-10 NOTE — TELEPHONE ENCOUNTER
Spoke with patient. Overall has been well since last transplant appointment. Did have influenza A over [place], still recovering a bit. Will have labs drawn on Friday. Denied any questions at this time.    Malaika Villafuerte RN

## 2018-01-12 ENCOUNTER — TELEPHONE (OUTPATIENT)
Dept: TRANSPLANT | Facility: CLINIC | Age: 48
End: 2018-01-12

## 2018-01-12 DIAGNOSIS — Z94.4 LIVER REPLACED BY TRANSPLANT (H): ICD-10-CM

## 2018-01-12 LAB
ALBUMIN SERPL-MCNC: 3.6 G/DL (ref 3.4–5)
ALP SERPL-CCNC: 59 U/L (ref 40–150)
ALT SERPL W P-5'-P-CCNC: 38 U/L (ref 0–70)
ANION GAP SERPL CALCULATED.3IONS-SCNC: 6 MMOL/L (ref 3–14)
AST SERPL W P-5'-P-CCNC: 23 U/L (ref 0–45)
BILIRUB DIRECT SERPL-MCNC: 0.2 MG/DL (ref 0–0.2)
BILIRUB SERPL-MCNC: 1.1 MG/DL (ref 0.2–1.3)
BUN SERPL-MCNC: 14 MG/DL (ref 7–30)
CALCIUM SERPL-MCNC: 8.6 MG/DL (ref 8.5–10.1)
CHLORIDE SERPL-SCNC: 106 MMOL/L (ref 94–109)
CO2 SERPL-SCNC: 27 MMOL/L (ref 20–32)
CREAT SERPL-MCNC: 1.14 MG/DL (ref 0.66–1.25)
ERYTHROCYTE [DISTWIDTH] IN BLOOD BY AUTOMATED COUNT: 12.8 % (ref 10–15)
GFR SERPL CREATININE-BSD FRML MDRD: 69 ML/MIN/1.7M2
GLUCOSE SERPL-MCNC: 89 MG/DL (ref 70–99)
HCT VFR BLD AUTO: 44.9 % (ref 40–53)
HGB BLD-MCNC: 14.5 G/DL (ref 13.3–17.7)
MCH RBC QN AUTO: 26.1 PG (ref 26.5–33)
MCHC RBC AUTO-ENTMCNC: 32.3 G/DL (ref 31.5–36.5)
MCV RBC AUTO: 81 FL (ref 78–100)
PLATELET # BLD AUTO: 267 10E9/L (ref 150–450)
POTASSIUM SERPL-SCNC: 4 MMOL/L (ref 3.4–5.3)
PROT SERPL-MCNC: 7.2 G/DL (ref 6.8–8.8)
RBC # BLD AUTO: 5.56 10E12/L (ref 4.4–5.9)
SIROLIMUS BLD-MCNC: 9.5 UG/L (ref 5–15)
SODIUM SERPL-SCNC: 139 MMOL/L (ref 133–144)
TME LAST DOSE: NORMAL H
WBC # BLD AUTO: 5.4 10E9/L (ref 4–11)

## 2018-01-12 RX ORDER — SIROLIMUS 0.5 MG/1
1 TABLET, FILM COATED ORAL DAILY
Qty: 180 TABLET | Refills: 3 | Status: SHIPPED | OUTPATIENT
Start: 2018-01-12 | End: 2018-01-23

## 2018-01-12 ASSESSMENT — ENCOUNTER SYMPTOMS
POSTURAL DYSPNEA: 1
DYSPNEA ON EXERTION: 0
DIARRHEA: 1
SINUS CONGESTION: 1
SPUTUM PRODUCTION: 1
SHORTNESS OF BREATH: 1
COUGH: 1
WHEEZING: 1
COUGH DISTURBING SLEEP: 1
SINUS PAIN: 1
TINGLING: 1
NUMBNESS: 1
INSOMNIA: 1

## 2018-01-12 NOTE — TELEPHONE ENCOUNTER
Provider Call: Transplant Lab  Facility Name: [facility]   Facility Location: [facility]  Reason for Call: Jarod left a urine sample and [facility] needs an order in EPIC.   Question.  Jarod or the lab were not sure if it was for a random urine for Protein ratio or UA/UC, I did not see any notes or orders in EPIC.  Callback needed? No

## 2018-01-15 ENCOUNTER — OFFICE VISIT (OUTPATIENT)
Dept: NEPHROLOGY | Facility: CLINIC | Age: 48
End: 2018-01-15
Attending: INTERNAL MEDICINE
Payer: COMMERCIAL

## 2018-01-15 VITALS
WEIGHT: 250 LBS | HEART RATE: 52 BPM | SYSTOLIC BLOOD PRESSURE: 148 MMHG | DIASTOLIC BLOOD PRESSURE: 90 MMHG | HEIGHT: 72 IN | RESPIRATION RATE: 20 BRPM | BODY MASS INDEX: 33.86 KG/M2

## 2018-01-15 DIAGNOSIS — Z94.0 KIDNEY REPLACED BY TRANSPLANT: ICD-10-CM

## 2018-01-15 DIAGNOSIS — Z48.298 AFTERCARE FOLLOWING ORGAN TRANSPLANT: ICD-10-CM

## 2018-01-15 DIAGNOSIS — Z94.4 LIVER REPLACED BY TRANSPLANT (H): ICD-10-CM

## 2018-01-15 DIAGNOSIS — R35.1 NOCTURIA: Primary | ICD-10-CM

## 2018-01-15 PROCEDURE — G0463 HOSPITAL OUTPT CLINIC VISIT: HCPCS | Mod: ZF

## 2018-01-15 RX ORDER — TAMSULOSIN HYDROCHLORIDE 0.4 MG/1
0.4 CAPSULE ORAL DAILY
Qty: 30 CAPSULE | Refills: 1 | Status: SHIPPED | OUTPATIENT
Start: 2018-01-15 | End: 2018-02-19

## 2018-01-15 ASSESSMENT — PAIN SCALES - GENERAL: PAINLEVEL: NO PAIN (0)

## 2018-01-15 NOTE — NURSING NOTE
Chief Complaint   Patient presents with     RECHECK     Kidney tx follow up       Initial /90  Pulse 52  Resp 20  Ht 1.829 m (6')  Wt 113.4 kg (250 lb)  BMI 33.91 kg/m2 Estimated body mass index is 33.91 kg/(m^2) as calculated from the following:    Height as of this encounter: 1.829 m (6').    Weight as of this encounter: 113.4 kg (250 lb).  Medication Reconciliation: jeannie WEAVER CMA

## 2018-01-15 NOTE — LETTER
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     1. SLK complicated by DGF; Stable graft function. Will continue to monitor  2. HTN -BP controlled on current regimen   3. Hyperlipidemia - reviewed cholesterol levels, LDL elevated    4. Weight gain - weight stabilized; monitor, recommend weight loss  5. Renal Osteodystrophy: will need to check PTH and vitamin D at some point   6. Nocturia will give a trial of Flomax   7. Immunosuppression: MMF+ Rapa    Reason for Visit:  Mr. Cherelle Quesada is here for follow up and immunosuppression management.    HPI:   Jarod Quesada is a 47 year old year old male with ESKD from HRS type 1 and is status post simultaneous liver and kidney transplant (SLK) on 1/14/13.  He continues to enjoy stable kidney and liver function. He has not had any rejection episodes. Switched to Rapamune from Prograf due to neuropathy and felling \"foggy\".   He tolerates IS well, although occasionally has some loose stools. BP well controlled. Denies nausea, voomiting or diarrhea.           Transplant Hx:       Tx: liver and Kidney transplant  Date: 1/14/13       Present Maintenance IS: Tacrolimus and Mycophenolate mofetil       Baseline Creatinine: 1.2 mg/dl      ROS:   A comprehensive review of systems was obtained and negative, except as noted in the HPI or PMH.    Active Medical Problems:  Patient Active Problem List   Diagnosis     Generalized anxiety disorder     Kidney replaced by transplant     Liver replaced by transplant (H)     High risk medications (not anticoagulants) long-term use     Immunosuppressed status (H)     Hypertension     Anemia in chronic renal disease     Secondary renal hyperparathyroidism (H)     Aftercare following organ " transplant     Personal Hx:  Social History     Social History     Marital status:      Spouse name: N/A     Number of children: N/A     Years of education: N/A     Occupational History     Not on file.     Social History Main Topics     Smoking status: Never Smoker     Smokeless tobacco: Never Used     Alcohol use No      Comment: Occasional, rare     Drug use: No     Sexual activity: Yes     Partners: Female     Other Topics Concern     Not on file     Social History Narrative     Allergies:  Allergies   Allergen Reactions     Paxil [Paroxetine] Other (See Comments)     Caused Severe Tremor     Chlorhexidine Rash     Phenol Rash     Medications:  Current Outpatient Prescriptions   Medication     tamsulosin (FLOMAX) 0.4 MG capsule     amLODIPine (NORVASC) 5 MG tablet     metoprolol (LOPRESSOR) 50 MG tablet     sildenafil (VIAGRA) 50 MG tablet     aspirin 81 MG chewable tablet     mycophenolate (GENERIC EQUIVALENT) 250 MG capsule     mycophenolate (GENERIC EQUIVALENT) 500 MG tablet     sirolimus (GENERIC EQUIVALENT) 0.5 MG tablet     No current facility-administered medications for this visit.      Vitals:  /90  Pulse 52  Resp 20  Ht 1.829 m (6')  Wt 113.4 kg (250 lb)  BMI 33.91 kg/m2  Exam:   GENERAL APPEARANCE: alert and no distress  HENT: mouth without ulcers or lesions  LYMPHATICS: no axillary, cervical or supraclavicular nodes  RESP: lungs clear to auscultation - no rales, rhonchi or wheezes  CV: regular rhythm, normal rate, no rub, no murmur  EDEMA:trace LE edema bilaterally, improving  ABDOMEN:  soft, nontender, MS: extremities normal- no gross deformities noted, no evidence of inflammation in joints, no muscle tenderness  SKIN: no rash    Results:   Reviewed     Jalen Emerson MD

## 2018-01-15 NOTE — MR AVS SNAPSHOT
After Visit Summary   1/15/2018    Jarod Obregon    MRN: [id number]           Patient Information     Date Of Birth          1970        Visit Information        Provider Department      1/15/2018 2:50 PM Jalen Emerson MD [facility] Nephrology        Today's Diagnoses     Nocturia    -  1    Kidney replaced by transplant        Liver replaced by transplant (H)        Aftercare following organ transplant           Follow-ups after your visit        Follow-up notes from your care team     Return in about 1 year (around 1/15/2019).      Your next 10 appointments already scheduled     Jan 25, 2018  8:00 AM CST   Lab with  LAB   [facility] Lab ([facility])    [address]  1st Floor  [facility] [address] [phone]            Jan 26, 2018  8:50 AM CST   (Arrive by 8:35 AM)   Return Liver Transplant with Jeannine Biggs MD   [facility] Hepatology ([facility])    [address] [place] [address]  [address]  [facility] [address] [phone]            Jan 08, 2019  4:30 PM CST   (Arrive by 4:00 PM)   Return Kidney Transplant with Jalen Emerson MD   [facility] Nephrology ([facility])    [address] [place] [address]  [address]  [facility] [address] [phone]              Who to contact     If you have questions or need follow up information about today's clinic visit or your schedule please contact [facility] NEPHROLOGY directly at [phone].  Normal or non-critical lab and imaging results will be communicated to you by MyChart, letter or phone within 4 business days after the clinic has received the results. If you do not hear from us within 7 days, please contact the clinic through MyChart or phone. If you have a critical or abnormal lab result, we will notify you by phone as soon as possible.  Submit refill requests through Colovore or call your pharmacy and they will forward the refill request  to us. Please allow 3 business days for your refill to be completed.          Additional Information About Your Visit        [employer] Information     [employer] gives you secure access to your electronic health record. If you see a primary care provider, you can also send messages to your care team and make appointments. If you have questions, please call your primary care clinic.  If you do not have a primary care provider, please call [phone] and they will assist you.        Care EveryWhere ID     This is your Care EveryWhere ID. This could be used by other organizations to access your [place] medical records  [id number]        Your Vitals Were     Pulse Respirations Height BMI (Body Mass Index)          52 20 1.829 m (6') 33.91 kg/m2         Blood Pressure from Last 3 Encounters:   01/15/18 148/90   01/27/17 (!) 133/95   01/17/17 129/83    Weight from Last 3 Encounters:   01/15/18 113.4 kg (250 lb)   01/27/17 115.1 kg (253 lb 12.8 oz)   01/17/17 116.6 kg (257 lb)                 Today's Medication Changes          These changes are accurate as of 1/15/18 11:59 PM.  If you have any questions, ask your nurse or doctor.               Start taking these medicines.        Dose/Directions    tamsulosin 0.4 MG capsule   Commonly known as:  FLOMAX   Used for:  Nocturia   Started by:  Jalen Emerson MD        Dose:  0.4 mg   Take 1 capsule (0.4 mg) by mouth daily   Quantity:  30 capsule   Refills:  1         Stop taking these medicines if you haven't already. Please contact your care team if you have questions.     gabapentin 100 MG capsule   Commonly known as:  NEURONTIN   Stopped by:  Jalen Emerson MD                Where to get your medicines      These medications were sent to [place] [address] [place] [address] [address]     Phone:  [phone]     tamsulosin 0.4 MG capsule                Primary Care Provider Office Phone # Fax #    Carrington HOUSE  MD Jeffery [phone]       [facility] [address]  [place] [address]        Equal Access to Services     ROYCE GAMEZ : Hadii aad ku haddavidshefali Jane, waswapna jin, elijahzi kachampda jill, nidhi bethin hayaan tcbravo clinton lagimichaela young. So [facility] [phone].    ATENCIÓN: Si habla español, tiene a membreno disposición servicios gratuitos de asistencia lingüística. Llame al [phone].    We comply with applicable federal civil rights laws and [place] laws. We do not discriminate on the basis of race, color, national origin, age, disability, sex, sexual orientation, or gender identity.            Thank you!     Thank you for choosing [facility] NEPHROLOGY  for your care. Our goal is always to provide you with excellent care. Hearing back from our patients is one way we can continue to improve our services. Please take a few minutes to complete the written survey that you may receive in the mail after your visit with us. Thank you!             Your Updated Medication List - Protect others around you: Learn how to safely use, store and throw away your medicines at www.disposemymeds.org.          This list is accurate as of 1/15/18 11:59 PM.  Always use your most recent med list.                   Brand Name Dispense Instructions for use Diagnosis    amLODIPine 5 MG tablet    NORVASC     Take 5 mg by mouth daily        aspirin 81 MG chewable tablet     90 tablet    Take 1 tablet by mouth daily.    Liver transplanted (H)       metoprolol tartrate 50 MG tablet    LOPRESSOR     Take 50 mg by mouth 2 times daily 75 in am/50 in the pm        sildenafil 50 MG tablet    VIAGRA    12 tablet    Take 0.5-1 tablets (25-50 mg) by mouth daily as needed for erectile dysfunction Take 30 min to 4 hours before intercourse.  Never use with nitroglycerin, terazosin or doxazosin.    ED (erectile dysfunction)       tamsulosin 0.4 MG capsule    FLOMAX    30 capsule    Take 1 capsule (0.4 mg) by mouth daily     Nocturia

## 2018-01-18 ENCOUNTER — TELEPHONE (OUTPATIENT)
Dept: TRANSPLANT | Facility: CLINIC | Age: 48
End: 2018-01-18

## 2018-01-18 NOTE — TELEPHONE ENCOUNTER
Provider Call: Transplant Provider  Facility Name: [facility]  Facility Location:   Reason for Call: LM on AM-, they have questions  Callback needed? Yes  Return Call Needed  Same as documented in contacts section

## 2018-01-22 ENCOUNTER — TELEPHONE (OUTPATIENT)
Dept: TRANSPLANT | Facility: CLINIC | Age: 48
End: 2018-01-22

## 2018-01-23 ENCOUNTER — TELEPHONE (OUTPATIENT)
Dept: TRANSPLANT | Facility: CLINIC | Age: 48
End: 2018-01-23

## 2018-01-23 DIAGNOSIS — Z94.4 LIVER REPLACED BY TRANSPLANT (H): ICD-10-CM

## 2018-01-23 DIAGNOSIS — Z94.9 TRANSPLANT: ICD-10-CM

## 2018-01-23 RX ORDER — MYCOPHENOLATE MOFETIL 500 MG/1
500 TABLET ORAL 2 TIMES DAILY
Qty: 180 TABLET | Refills: 3 | Status: SHIPPED | OUTPATIENT
Start: 2018-01-23 | End: 2018-10-26

## 2018-01-23 RX ORDER — MYCOPHENOLATE MOFETIL 250 MG/1
250 CAPSULE ORAL 2 TIMES DAILY
Qty: 180 CAPSULE | Refills: 3 | Status: SHIPPED | OUTPATIENT
Start: 2018-01-23 | End: 2018-10-26

## 2018-01-23 RX ORDER — SIROLIMUS 0.5 MG/1
1 TABLET, FILM COATED ORAL DAILY
Qty: 180 TABLET | Refills: 3 | Status: SHIPPED | OUTPATIENT
Start: 2018-01-23 | End: 2018-01-26

## 2018-01-25 DIAGNOSIS — R35.1 NOCTURIA: ICD-10-CM

## 2018-01-25 DIAGNOSIS — Z94.4 LIVER REPLACED BY TRANSPLANT (H): ICD-10-CM

## 2018-01-25 LAB
ALBUMIN SERPL-MCNC: 3.4 G/DL (ref 3.4–5)
ALP SERPL-CCNC: 53 U/L (ref 40–150)
ALT SERPL W P-5'-P-CCNC: 42 U/L (ref 0–70)
ANION GAP SERPL CALCULATED.3IONS-SCNC: 5 MMOL/L (ref 3–14)
AST SERPL W P-5'-P-CCNC: 26 U/L (ref 0–45)
BILIRUB DIRECT SERPL-MCNC: 0.2 MG/DL (ref 0–0.2)
BILIRUB SERPL-MCNC: 1.2 MG/DL (ref 0.2–1.3)
BUN SERPL-MCNC: 14 MG/DL (ref 7–30)
CALCIUM SERPL-MCNC: 8.4 MG/DL (ref 8.5–10.1)
CHLORIDE SERPL-SCNC: 106 MMOL/L (ref 94–109)
CO2 SERPL-SCNC: 26 MMOL/L (ref 20–32)
CREAT SERPL-MCNC: 1.26 MG/DL (ref 0.66–1.25)
ERYTHROCYTE [DISTWIDTH] IN BLOOD BY AUTOMATED COUNT: 13.4 % (ref 10–15)
GFR SERPL CREATININE-BSD FRML MDRD: 61 ML/MIN/1.7M2
GLUCOSE SERPL-MCNC: 90 MG/DL (ref 70–99)
HCT VFR BLD AUTO: 44.2 % (ref 40–53)
HGB BLD-MCNC: 14.2 G/DL (ref 13.3–17.7)
MCH RBC QN AUTO: 25.8 PG (ref 26.5–33)
MCHC RBC AUTO-ENTMCNC: 32.1 G/DL (ref 31.5–36.5)
MCV RBC AUTO: 80 FL (ref 78–100)
PLATELET # BLD AUTO: 178 10E9/L (ref 150–450)
POTASSIUM SERPL-SCNC: 3.4 MMOL/L (ref 3.4–5.3)
PROT SERPL-MCNC: 6.8 G/DL (ref 6.8–8.8)
PSA SERPL-ACNC: 0.78 UG/L (ref 0–4)
RBC # BLD AUTO: 5.51 10E12/L (ref 4.4–5.9)
SIROLIMUS BLD-MCNC: 10.8 UG/L (ref 5–15)
SODIUM SERPL-SCNC: 137 MMOL/L (ref 133–144)
TME LAST DOSE: NORMAL H
WBC # BLD AUTO: 4.2 10E9/L (ref 4–11)

## 2018-01-25 NOTE — PROGRESS NOTES
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     1. SLK complicated by DGF; Stable graft function. Will continue to monitor  2. HTN -BP controlled on current regimen   3. Hyperlipidemia - reviewed cholesterol levels, LDL elevated    4. Weight gain - weight stabilized; monitor, recommend weight loss  5. Renal Osteodystrophy: will need to check PTH and vitamin D at some point   6. Nocturia will give a trial of Flomax   7. Immunosuppression: MMF+ Rapa    Reason for Visit:  Mr. Cherelle Quesada is here for follow up and immunosuppression management.    HPI:   Jarodalex Quesada is a 47 year old year old male with ESKD from HRS type 1 and is status post simultaneous liver and kidney transplant (SLK) on 1/14/13.  He continues to enjoy stable kidney and liver function. He has not had any rejection episodes. Switched to Rapamune from Prograf due to neuropathy and felling \"foggy\".   He tolerates IS well, although occasionally has some loose stools. BP well controlled. Denies nausea, voomiting or diarrhea.           Transplant Hx:       Tx: liver and Kidney transplant  Date: 1/14/13       Present Maintenance IS: Tacrolimus and Mycophenolate mofetil       Baseline Creatinine: 1.2 mg/dl      ROS:   A comprehensive review of systems was obtained and negative, except as noted in the HPI or PMH.    Active Medical Problems:  Patient Active Problem List   Diagnosis     Generalized anxiety disorder     Kidney replaced by transplant     Liver replaced by transplant (H)     High risk medications (not anticoagulants) long-term use     Immunosuppressed status (H)     Hypertension     Anemia in chronic renal disease     Secondary renal hyperparathyroidism (H)     Aftercare following organ transplant     Personal Hx:  Social History     Social History     Marital status: "      Spouse name: N/A     Number of children: N/A     Years of education: N/A     Occupational History     Not on file.     Social History Main Topics     Smoking status: Never Smoker     Smokeless tobacco: Never Used     Alcohol use No      Comment: Occasional, rare     Drug use: No     Sexual activity: Yes     Partners: Female     Other Topics Concern     Not on file     Social History Narrative     Allergies:  Allergies   Allergen Reactions     Paxil [Paroxetine] Other (See Comments)     Caused Severe Tremor     Chlorhexidine Rash     Phenol Rash     Medications:  Current Outpatient Prescriptions   Medication     tamsulosin (FLOMAX) 0.4 MG capsule     amLODIPine (NORVASC) 5 MG tablet     metoprolol (LOPRESSOR) 50 MG tablet     sildenafil (VIAGRA) 50 MG tablet     aspirin 81 MG chewable tablet     mycophenolate (GENERIC EQUIVALENT) 250 MG capsule     mycophenolate (GENERIC EQUIVALENT) 500 MG tablet     sirolimus (GENERIC EQUIVALENT) 0.5 MG tablet     No current facility-administered medications for this visit.      Vitals:  /90  Pulse 52  Resp 20  Ht 1.829 m (6')  Wt 113.4 kg (250 lb)  BMI 33.91 kg/m2  Exam:   GENERAL APPEARANCE: alert and no distress  HENT: mouth without ulcers or lesions  LYMPHATICS: no axillary, cervical or supraclavicular nodes  RESP: lungs clear to auscultation - no rales, rhonchi or wheezes  CV: regular rhythm, normal rate, no rub, no murmur  EDEMA:trace LE edema bilaterally, improving  ABDOMEN:  soft, nontender, MS: extremities normal- no gross deformities noted, no evidence of inflammation in joints, no muscle tenderness  SKIN: no rash    Results:   Reviewed

## 2018-01-25 NOTE — PROGRESS NOTES
[facility]  LIVER TRANSPLANT CLINIC    A/P  47 year old male s/p LT   Medically doing extremely well.    IS: Rapa and MMF. Will repeat rapa level next week.  No changes to medications today. Labs up to date and normal.  Influenza A infection: resolved  Discussed skin cancer screening: due for derm.  Discussed ventral hernia and weight gain. Recommend weight loss.  Colonoscopy in 3 years at age 50.  Will see back in 1 year.   This was a 25 minute visit, over 50% counseling and coordination of care.     Jeannine Biggs MD  Hepatology/ Liver Transplant  [facility]  ===================================================================  PCP: Dr. Gil at [facility].   Nephrology: Dr. Jalen Emerson      SUBJECTIVE  47 year old male s/p SLK 1/14/13 for ETOH.  EXPLANT: Cirrhosis, no HCC  IS: SRL and MMF  LABS: Up to date and normal liver tests  REJECTION: None.  BILIARY ISSUES: None  STENT: No stent on abdominal imaging post transpalnt  KIDNEY FUNCTION: Sees Dr. Emerson. Stable  Creatinine   Date Value Ref Range Status   01/25/2018 1.26 (H) 0.66 - 1.25 mg/dL Final     BP: Controlled on amlodipine, metoprolol  PREV: UTD on screening (colonoscopy 6/29/2012 no polyps, area of ulceration. Path showed nonspecific changes. Record is located in Media tab on 9/21/12)   Derm Feb 2017.   DISEASE RECURRENCE: No alcohol  OTHER ISSUES: Struggles with weight gain. Also has neuropathy, on gabapentin. Hyperlipidemia  NEW ISSUES:   Had influenza this year. A lot of coughing with it and it bothered his hernia.    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  ROS: 10 point ROS neg other than the symptoms noted above in the HPI.    OBJECTIVE  /86  Pulse 63  Temp 98.9  F (37.2  C) (Oral)  Ht 1.829 m (6')  Wt 111.3 kg (245 lb 6.4 oz)  SpO2 100%  BMI 33.28 kg/m2  GENERAL:  Very pleasant, well-appearing, in no acute distress.    HEENT:  No icterus, no oral lesions.    LYMPH:  No supraclavicular or  cervical lymphadenopathy.    CARDIOVASCULAR:  Regular rate and rhythm.    CHEST:  Lungs are clear.    ABDOMEN:  Bowel sounds are present.  Abdomen is soft, nontender, nondistended.  Med soft ventral hernia.    EXTREMITIES:  No edema.    SKIN:  No rash.    NEUROLOGIC:  Speech is fluent and clear.  No asterixis or tremor.      Creatinine   Date Value Ref Range Status   01/25/2018 1.26 (H) 0.66 - 1.25 mg/dL Final   ]   Lab Results   Component Value Date    BILITOTAL 1.2 01/25/2018    BILITOTAL 1.1 01/12/2018    BILITOTAL 1.0 10/10/2017    BILITOTAL 0.8 05/15/2017    BILITOTAL 0.9 12/05/2016      Lab Results   Component Value Date    ALT 42 01/25/2018      Lab Results   Component Value Date    ALBUMIN 3.4 01/25/2018       Hemoglobin   Date Value Ref Range Status   01/25/2018 14.2 13.3 - 17.7 g/dL Final   ]    Current Outpatient Prescriptions   Medication     mycophenolate (GENERIC EQUIVALENT) 250 MG capsule     mycophenolate (GENERIC EQUIVALENT) 500 MG tablet     sirolimus (GENERIC EQUIVALENT) 0.5 MG tablet     tamsulosin (FLOMAX) 0.4 MG capsule     amLODIPine (NORVASC) 5 MG tablet     metoprolol (LOPRESSOR) 50 MG tablet     sildenafil (VIAGRA) 50 MG tablet     aspirin 81 MG chewable tablet     No current facility-administered medications for this visit.      .

## 2018-01-26 ENCOUNTER — OFFICE VISIT (OUTPATIENT)
Dept: GASTROENTEROLOGY | Facility: CLINIC | Age: 48
End: 2018-01-26
Attending: INTERNAL MEDICINE
Payer: COMMERCIAL

## 2018-01-26 VITALS
TEMPERATURE: 98.9 F | HEART RATE: 63 BPM | HEIGHT: 72 IN | BODY MASS INDEX: 33.24 KG/M2 | DIASTOLIC BLOOD PRESSURE: 86 MMHG | OXYGEN SATURATION: 100 % | SYSTOLIC BLOOD PRESSURE: 147 MMHG | WEIGHT: 245.4 LBS

## 2018-01-26 DIAGNOSIS — Z94.4 LIVER REPLACED BY TRANSPLANT (H): Primary | ICD-10-CM

## 2018-01-26 PROCEDURE — G0463 HOSPITAL OUTPT CLINIC VISIT: HCPCS | Mod: ZF

## 2018-01-26 ASSESSMENT — PAIN SCALES - GENERAL: PAINLEVEL: NO PAIN (0)

## 2018-01-26 NOTE — MR AVS SNAPSHOT
After Visit Summary   1/26/2018    Jarod Obregon    MRN: [id number]           Patient Information     Date Of Birth          1970        Visit Information        Provider Department      1/26/2018 8:50 AM Jeannine Biggs MD [facility] Hepatology        Today's Diagnoses     Liver replaced by transplant (H)    -  1       Follow-ups after your visit        Follow-up notes from your care team     Return in about 1 year (around 1/26/2019).      Your next 10 appointments already scheduled     Jan 07, 2019  8:00 AM CST   LAB with  LAB   [facility] ([facility])    [address] [place] [address]  1st Floor  [facility] [id number] [phone]           Please do not eat 10-12 hours before your appointment if you are coming in fasting for labs on lipids, cholesterol, or glucose (sugar). This does not apply to pregnant women. Water, hot tea and black coffee (with nothing added) are okay. Do not drink other fluids, diet soda or chew gum.            Jan 08, 2019 12:10 PM CST   (Arrive by 11:55 AM)   Return Liver Transplant with Jeannine Biggs MD   [facility] ([facility])    [address] [place] [address]  [address]  [facility] [address] [phone]            Jan 08, 2019 12:45 PM CST   (Arrive by 12:15 PM)   Return Kidney Transplant with Jalen Emerson MD   [facility] ([facility])    [address] [place] [address]  [address]  [facility] [id number] [phone]              Future tests that were ordered for you today     Open Future Orders        Priority Expected Expires Ordered    Sirolimus level Routine 2/1/2018 2/25/2018 1/26/2018            Who to contact     If you have questions or need follow up information about today's clinic visit or your schedule please contact [facility] HEPATOLOGY directly at [phone].  Normal or non-critical lab and imaging results will be communicated to  you by MyChart, letter or phone within 4 business days after the clinic has received the results. If you do not hear from us within 7 days, please contact the clinic through [employer] or phone. If you have a critical or abnormal lab result, we will notify you by phone as soon as possible.  Submit refill requests through [employer] or call your pharmacy and they will forward the refill request to us. Please allow 3 business days for your refill to be completed.          Additional Information About Your Visit        [email or web address]     [employer] gives you secure access to your electronic health record. If you see a primary care provider, you can also send messages to your care team and make appointments. If you have questions, please call your primary care clinic.  If you do not have a primary care provider, please call [phone] and they will assist you.        Care EveryWhere ID     This is your Care EveryWhere ID. This could be used by other organizations to access your [facility] medical records  [id number]        Your Vitals Were     Pulse Temperature Height Pulse Oximetry BMI (Body Mass Index)       63 98.9  F (37.2  C) (Oral) 1.829 m (6') 100% 33.28 kg/m2        Blood Pressure from Last 3 Encounters:   01/26/18 147/86   01/15/18 148/90   01/27/17 (!) 133/95    Weight from Last 3 Encounters:   01/26/18 111.3 kg (245 lb 6.4 oz)   01/15/18 113.4 kg (250 lb)   01/27/17 115.1 kg (253 lb 12.8 oz)               Primary Care Provider Office Phone # Fax #    Carrington Gil [phone]       [facility] [address]  [place] [address]        Equal Access to Services     [facility] Hospital[facility] VICTORIA : Hadii maria del carmen amaya Somartin, waaxda luqadaha, qaybta kaalmada nidhi melchor. So [facility] [phone].    ATENCIÓN: Si habla español, tiene a membreno disposición servicios gratuitos de asistencia lingüística. Llame al [phone].    We comply with applicable  federal civil rights laws and [place] laws. We do not discriminate on the basis of race, color, national origin, age, disability, sex, sexual orientation, or gender identity.            Thank you!     Thank you for choosing [facility] HEPATOLOGY  for your care. Our goal is always to provide you with excellent care. Hearing back from our patients is one way we can continue to improve our services. Please take a few minutes to complete the written survey that you may receive in the mail after your visit with us. Thank you!             Your Updated Medication List - Protect others around you: Learn how to safely use, store and throw away your medicines at www.disposemymeds.org.          This list is accurate as of 1/26/18  9:25 AM.  Always use your most recent med list.                   Brand Name Dispense Instructions for use Diagnosis    amLODIPine 5 MG tablet    NORVASC     Take 5 mg by mouth daily        aspirin 81 MG chewable tablet     90 tablet    Take 1 tablet by mouth daily.    Liver transplanted (H)       metoprolol tartrate 50 MG tablet    LOPRESSOR     Take 50 mg by mouth 2 times daily 75 mg in am and 50 mg        * mycophenolate 250 MG capsule    GENERIC EQUIVALENT    180 capsule    Take 1 capsule (250 mg) by mouth 2 times daily with 500 mg caps for a total of 750 mg twice daily    Transplant       * mycophenolate 500 MG tablet    GENERIC EQUIVALENT    180 tablet    Take 1 tablet (500 mg) by mouth 2 times daily with 250 mg caps for a total of 750 mg twice daily    Transplant       sildenafil 50 MG tablet    VIAGRA    12 tablet    Take 0.5-1 tablets (25-50 mg) by mouth daily as needed for erectile dysfunction Take 30 min to 4 hours before intercourse.  Never use with nitroglycerin, terazosin or doxazosin.    ED (erectile dysfunction)       sirolimus 0.5 MG tablet    GENERIC EQUIVALENT    180 tablet    Take 2 tablets (1 mg) by mouth daily    Liver replaced by transplant (H)       tamsulosin 0.4 MG capsule     FLOMAX    30 capsule    Take 1 capsule (0.4 mg) by mouth daily    Nocturia       * Notice:  This list has 2 medication(s) that are the same as other medications prescribed for you. Read the directions carefully, and ask your doctor or other care provider to review them with you.

## 2018-01-26 NOTE — LETTER
1/26/2018    RE: Jarod Villarreal [place]  [address] [place] [address]   [facility]  [facility]    A/P  47 year old male s/p LT   Medically doing extremely well.    IS: Rapa and MMF. Will repeat rapa level next week.  No changes to medications today. Labs up to date and normal.  Influenza A infection: resolved  Discussed skin cancer screening: due for derm.  Discussed ventral hernia and weight gain. Recommend weight loss.  Colonoscopy in 3 years at age 50.  Will see back in 1 year.   This was a 25 minute visit, over 50% counseling and coordination of care.     Jeannine Biggs MD  Hepatology/ Liver Transplant  [facility]  ===================================================================  PCP: Dr. Gil at [facility].   Nephrology: Dr. Jalen Emerson      SUBJECTIVE  47 year old male s/p SLK 1/14/13 for ETOH.  EXPLANT: Cirrhosis, no HCC  IS: SRL and MMF  LABS: Up to date and normal liver tests  REJECTION: None.  BILIARY ISSUES: None  STENT: No stent on abdominal imaging post transpalnt  KIDNEY FUNCTION: Sees Dr. Emerson. Stable  Creatinine   Date Value Ref Range Status   01/25/2018 1.26 (H) 0.66 - 1.25 mg/dL Final     BP: Controlled on amlodipine, metoprolol  PREV: UTD on screening (colonoscopy 6/29/2012 no polyps, area of ulceration. Path showed nonspecific changes. Record is located in Media tab on 9/21/12)   Derm Feb 2017.   DISEASE RECURRENCE: No alcohol  OTHER ISSUES: Struggles with weight gain. Also has neuropathy, on gabapentin. Hyperlipidemia  NEW ISSUES:   Had influenza this year. A lot of coughing with it and it bothered his hernia.    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  ROS: 10 point ROS neg other than the symptoms noted above in the HPI.    OBJECTIVE  /86  Pulse 63  Temp 98.9  F (37.2  C) (Oral)  Ht 1.829 m (6')  Wt 111.3 kg (245 lb 6.4 oz)  SpO2 100%  BMI 33.28 kg/m2  GENERAL:  Very pleasant, well-appearing, in no acute  distress.    HEENT:  No icterus, no oral lesions.    LYMPH:  No supraclavicular or cervical lymphadenopathy.    CARDIOVASCULAR:  Regular rate and rhythm.    CHEST:  Lungs are clear.    ABDOMEN:  Bowel sounds are present.  Abdomen is soft, nontender, nondistended.  Med soft ventral hernia.    EXTREMITIES:  No edema.    SKIN:  No rash.    NEUROLOGIC:  Speech is fluent and clear.  No asterixis or tremor.      Creatinine   Date Value Ref Range Status   01/25/2018 1.26 (H) 0.66 - 1.25 mg/dL Final   ]   Lab Results   Component Value Date    BILITOTAL 1.2 01/25/2018    BILITOTAL 1.1 01/12/2018    BILITOTAL 1.0 10/10/2017    BILITOTAL 0.8 05/15/2017    BILITOTAL 0.9 12/05/2016      Lab Results   Component Value Date    ALT 42 01/25/2018      Lab Results   Component Value Date    ALBUMIN 3.4 01/25/2018       Hemoglobin   Date Value Ref Range Status   01/25/2018 14.2 13.3 - 17.7 g/dL Final   ]    Current Outpatient Prescriptions   Medication     mycophenolate (GENERIC EQUIVALENT) 250 MG capsule     mycophenolate (GENERIC EQUIVALENT) 500 MG tablet     sirolimus (GENERIC EQUIVALENT) 0.5 MG tablet     tamsulosin (FLOMAX) 0.4 MG capsule     amLODIPine (NORVASC) 5 MG tablet     metoprolol (LOPRESSOR) 50 MG tablet     sildenafil (VIAGRA) 50 MG tablet     aspirin 81 MG chewable tablet     No current facility-administered medications for this visit.      .   Jeannine Biggs MD

## 2018-01-29 RX ORDER — SIROLIMUS 0.5 MG/1
0.5 TABLET, FILM COATED ORAL DAILY
Qty: 90 TABLET | Refills: 3 | Status: SHIPPED | OUTPATIENT
Start: 2018-01-29 | End: 2018-04-18

## 2018-04-17 DIAGNOSIS — Z94.4 LIVER REPLACED BY TRANSPLANT (H): ICD-10-CM

## 2018-04-17 LAB
ALBUMIN SERPL-MCNC: 3.8 G/DL (ref 3.4–5)
ALP SERPL-CCNC: 59 U/L (ref 40–150)
ALT SERPL W P-5'-P-CCNC: 37 U/L (ref 0–70)
ANION GAP SERPL CALCULATED.3IONS-SCNC: 8 MMOL/L (ref 3–14)
AST SERPL W P-5'-P-CCNC: 20 U/L (ref 0–45)
BILIRUB DIRECT SERPL-MCNC: 0.2 MG/DL (ref 0–0.2)
BILIRUB SERPL-MCNC: 1.2 MG/DL (ref 0.2–1.3)
BUN SERPL-MCNC: 15 MG/DL (ref 7–30)
CALCIUM SERPL-MCNC: 9.2 MG/DL (ref 8.5–10.1)
CHLORIDE SERPL-SCNC: 106 MMOL/L (ref 94–109)
CO2 SERPL-SCNC: 23 MMOL/L (ref 20–32)
CREAT SERPL-MCNC: 1.18 MG/DL (ref 0.66–1.25)
ERYTHROCYTE [DISTWIDTH] IN BLOOD BY AUTOMATED COUNT: 13.6 % (ref 10–15)
GFR SERPL CREATININE-BSD FRML MDRD: 66 ML/MIN/1.7M2
GLUCOSE SERPL-MCNC: 89 MG/DL (ref 70–99)
HCT VFR BLD AUTO: 46.8 % (ref 40–53)
HGB BLD-MCNC: 15.8 G/DL (ref 13.3–17.7)
MCH RBC QN AUTO: 27.3 PG (ref 26.5–33)
MCHC RBC AUTO-ENTMCNC: 33.8 G/DL (ref 31.5–36.5)
MCV RBC AUTO: 81 FL (ref 78–100)
PLATELET # BLD AUTO: 187 10E9/L (ref 150–450)
POTASSIUM SERPL-SCNC: 3.9 MMOL/L (ref 3.4–5.3)
PROT SERPL-MCNC: 7 G/DL (ref 6.8–8.8)
RBC # BLD AUTO: 5.79 10E12/L (ref 4.4–5.9)
SIROLIMUS BLD-MCNC: 2.8 UG/L (ref 5–15)
SODIUM SERPL-SCNC: 137 MMOL/L (ref 133–144)
TME LAST DOSE: ABNORMAL H
WBC # BLD AUTO: 4.7 10E9/L (ref 4–11)

## 2018-04-18 NOTE — TELEPHONE ENCOUNTER
LM for pt instructing him to increase sirolimus to 1 mg daily. Requested return phone call to confirm dose change.

## 2018-04-19 RX ORDER — SIROLIMUS 0.5 MG/1
1 TABLET, FILM COATED ORAL DAILY
Qty: 180 TABLET | Refills: 3 | Status: SHIPPED | OUTPATIENT
Start: 2018-04-19 | End: 2018-10-09

## 2018-04-23 ENCOUNTER — TELEPHONE (OUTPATIENT)
Dept: NEPHROLOGY | Facility: CLINIC | Age: 48
End: 2018-04-23

## 2018-04-23 DIAGNOSIS — R35.1 NOCTURIA: ICD-10-CM

## 2018-04-23 RX ORDER — TAMSULOSIN HYDROCHLORIDE 0.4 MG/1
0.4 CAPSULE ORAL DAILY
Qty: 30 CAPSULE | Refills: 1 | Status: SHIPPED | OUTPATIENT
Start: 2018-04-23 | End: 2020-01-07

## 2018-05-01 ENCOUNTER — TELEPHONE (OUTPATIENT)
Dept: TRANSPLANT | Facility: CLINIC | Age: 48
End: 2018-05-01

## 2018-05-01 NOTE — TELEPHONE ENCOUNTER
Returned call to [facility], confirmed they had all information necessary and nothing further is required at this time.

## 2018-05-01 NOTE — TELEPHONE ENCOUNTER
Provider Call: Medication Clarification  Route to LPN  Name of Medication: Sirolimus Question: Needs clarification on dosage  Pharmacy Name: [facility]  Pharmacy Location: [address] [place] [address] [place]  Callback needed? Yes    Return Call Needed  Same as documented in contacts section  When to return call?: Greater than one day: Route standard priority

## 2018-05-16 DIAGNOSIS — Z94.4 LIVER REPLACED BY TRANSPLANT (H): ICD-10-CM

## 2018-05-16 LAB
ALBUMIN SERPL-MCNC: 3.8 G/DL (ref 3.4–5)
ALP SERPL-CCNC: 59 U/L (ref 40–150)
ALT SERPL W P-5'-P-CCNC: 34 U/L (ref 0–70)
ANION GAP SERPL CALCULATED.3IONS-SCNC: 8 MMOL/L (ref 3–14)
AST SERPL W P-5'-P-CCNC: 21 U/L (ref 0–45)
BILIRUB DIRECT SERPL-MCNC: 0.2 MG/DL (ref 0–0.2)
BILIRUB SERPL-MCNC: 1 MG/DL (ref 0.2–1.3)
BUN SERPL-MCNC: 21 MG/DL (ref 7–30)
CALCIUM SERPL-MCNC: 9 MG/DL (ref 8.5–10.1)
CHLORIDE SERPL-SCNC: 105 MMOL/L (ref 94–109)
CO2 SERPL-SCNC: 25 MMOL/L (ref 20–32)
CREAT SERPL-MCNC: 1.24 MG/DL (ref 0.66–1.25)
ERYTHROCYTE [DISTWIDTH] IN BLOOD BY AUTOMATED COUNT: 13.1 % (ref 10–15)
GFR SERPL CREATININE-BSD FRML MDRD: 62 ML/MIN/1.7M2
GLUCOSE SERPL-MCNC: 89 MG/DL (ref 70–99)
HCT VFR BLD AUTO: 47.3 % (ref 40–53)
HGB BLD-MCNC: 15.9 G/DL (ref 13.3–17.7)
MCH RBC QN AUTO: 27.3 PG (ref 26.5–33)
MCHC RBC AUTO-ENTMCNC: 33.6 G/DL (ref 31.5–36.5)
MCV RBC AUTO: 81 FL (ref 78–100)
PLATELET # BLD AUTO: 201 10E9/L (ref 150–450)
POTASSIUM SERPL-SCNC: 4.6 MMOL/L (ref 3.4–5.3)
PROT SERPL-MCNC: 7.1 G/DL (ref 6.8–8.8)
RBC # BLD AUTO: 5.83 10E12/L (ref 4.4–5.9)
SIROLIMUS BLD-MCNC: 5.5 UG/L (ref 5–15)
SODIUM SERPL-SCNC: 138 MMOL/L (ref 133–144)
TME LAST DOSE: NORMAL H
WBC # BLD AUTO: 4.5 10E9/L (ref 4–11)

## 2018-06-19 ENCOUNTER — TELEPHONE (OUTPATIENT)
Dept: TRANSPLANT | Facility: CLINIC | Age: 48
End: 2018-06-19

## 2018-06-19 DIAGNOSIS — M87.051 AVASCULAR NECROSIS OF FEMORAL HEAD, RIGHT (H): Primary | ICD-10-CM

## 2018-06-19 NOTE — TELEPHONE ENCOUNTER
Call from Jarod, he was told by an orthopedist at [facility] and was told that he has avascular necrosis of the right femoral head.  He would like to see an orthopedic surgeon at the [facility].  Referral placed,  Suggested Austin Moura.

## 2018-06-25 DIAGNOSIS — Z94.4 LIVER REPLACED BY TRANSPLANT (H): Primary | ICD-10-CM

## 2018-07-25 ASSESSMENT — ENCOUNTER SYMPTOMS
SPEECH CHANGE: 0
DIZZINESS: 1
BACK PAIN: 0
SEIZURES: 0
TREMORS: 0
DISTURBANCES IN COORDINATION: 0
NUMBNESS: 0
HEADACHES: 0
MUSCLE WEAKNESS: 0
MEMORY LOSS: 0
WEAKNESS: 1
TINGLING: 1
STIFFNESS: 1
JOINT SWELLING: 0
NECK PAIN: 0
ARTHRALGIAS: 1
MUSCLE CRAMPS: 0
LOSS OF CONSCIOUSNESS: 0
MYALGIAS: 0
PARALYSIS: 0

## 2018-08-06 ENCOUNTER — PRE VISIT (OUTPATIENT)
Dept: ORTHOPEDICS | Facility: CLINIC | Age: 48
End: 2018-08-06

## 2018-08-06 NOTE — TELEPHONE ENCOUNTER
FUTURE VISIT INFORMATION      FUTURE VISIT INFORMATION:    Date: 8/8/18    Time: 4:00    Location: [facility]  REFERRAL INFORMATION:    Referring provider:      Referring providers clinic:      Reason for visit/diagnosis  Avascular necrosis of femoral head, right    RECORDS REQUESTED FROM:       Clinic name Comments Records Status Imaging Status   [facility] everywhere In pacs                                   RECORDS STATUS

## 2018-08-08 ENCOUNTER — OFFICE VISIT (OUTPATIENT)
Dept: ORTHOPEDICS | Facility: CLINIC | Age: 48
End: 2018-08-08
Payer: COMMERCIAL

## 2018-08-08 ENCOUNTER — RADIANT APPOINTMENT (OUTPATIENT)
Dept: GENERAL RADIOLOGY | Facility: CLINIC | Age: 48
End: 2018-08-08
Attending: ORTHOPAEDIC SURGERY
Payer: COMMERCIAL

## 2018-08-08 VITALS — WEIGHT: 254 LBS | BODY MASS INDEX: 34.4 KG/M2 | HEIGHT: 72 IN

## 2018-08-08 DIAGNOSIS — M87.9 OSTEONECROSIS OF RIGHT HIP (H): Primary | ICD-10-CM

## 2018-08-08 DIAGNOSIS — M87.9 OSTEONECROSIS OF RIGHT HIP (H): ICD-10-CM

## 2018-08-08 NOTE — LETTER
8/8/2018       RE: Jarod Obregon  [address] [place] [address]     Dear Colleague,    Thank you for referring your patient, Jarod Obregon, to the [facility] at [facility]. Please see a copy of my visit note below.    [facility], Orthopaedic Surgery, Arthritis, Hip and Knee Replacement    Jarod Obregon MRN# [id number]   Age: 48 year old YOB: 1970     Requesting physician: Jeannine Malik*              History of Present Illness:   Jarod Obregon is a 48 year old year old male with history of liver and kidney transplant in 2013 secondary to hepatorenal syndrome and alcoholic cirrhosis, who presents today for evaluation and management of right hip pain.  He has had 2 years of right hip pain but it has gotten progressively worse over the last couple months he was seen by another orthopedist in May 2018 and diagnosed with avascular necrosis of the right hip.  He was referred to physical therapy but did not find that helpful.  His symptoms do wax and wane but overall he finds that he is having more frequent episodes of more severe pain.  He uses the cane when pain is worse.  He has difficulty getting out of chairs or cars and going up stairs.  He has nighttime pain and inability to lay on the left side as of right hip discomfort     He does not take Tylenol or ibuprofen due to history of transplant.  He does not take any narcotic pain medication.  He has not had any steroid injections.    Patient is on antirejection medication but he is not on steroids.    Background history:  DX:  1. Right hip avascular necrosis    TREATMENTS:  1. None             Past Medical History:     Patient Active Problem List   Diagnosis     Generalized anxiety disorder     Kidney replaced by transplant     Liver replaced by transplant (H)     High risk medications (not anticoagulants) long-term use     Immunosuppressed status  (H)     Hypertension     Anemia in chronic renal disease     Secondary renal hyperparathyroidism (H)     Aftercare following organ transplant     Past Medical History:   Diagnosis Date     Alcoholic cirrhosis of liver (H)      Anemia      Anemia in chronic kidney disease(285.21)      Anxiety      Ascites      Bleeding disorder (H)      Chronic infection     VRE+     Dialysis care      Dialysis patient (H)      Dizziness      Elevated blood pressure reading without diagnosis of hypertension      End stage renal disease (H)      ESRD (end stage renal disease) (H)      Generalized anxiety disorder      Generalized anxiety disorder      GI bleed      GI bleeding      Hepatic encephalopathy (H)      Hepatorenal syndrome (H)      High risk medications (not anticoagulants) long-term use      History of blood transfusion      Hydrocele      Hypertension      Hypogonadism male      Hypotension      Immunosuppressed status (H)      Insomnia with sleep apnea, unspecified      Kidney replaced by transplant      Liver failure (H)      Liver replaced by transplant (H)      Myopathy      Obesity      Other and unspecified alcohol dependence, unspecified drinking behavior      Renal failure      Secondary hyperparathyroidism (of renal origin)      Sleep apnea     mild in past, improved with weight loss     Thrombocytopenia (H)      Unspecified vitamin D deficiency      Prior history of blood clot: none  Prior history of bleeding problems: none  Prior history of anesthetic complications: none  Currently on opioids:  none  History of Diabetes (if so, recent A1c): no           Past Surgical History:     Past Surgical History:   Procedure Laterality Date     BENCH KIDNEY  1/14/2013    Procedure: BENCH KIDNEY;;  Surgeon: Claudy Hassan MD;  Location:  OR     BENCH LIVER  1/14/2013    Procedure: BENCH LIVER;;  Surgeon: Claudy Hassan MD;  Location: [facility] OR     CHOLECYSTECTOMY       COLONOSCOPY       CYSTOSCOPY, REMOVE STENT(S),  COMBINED  2013    Procedure: COMBINED CYSTOSCOPY, REMOVE STENT(S);  Flexible Cystoscopy, Removal Right Ureteral Stent;  Surgeon: Claudy Hassan MD;  Location: [facility] OR     diaylysis shunt       ENDOSCOPY       ENT SURGERY      sinus surgery     HERNIA REPAIR      I believe this was done during tranplant surgery     LIGATE FISTULA ARTERIOVENOUS UPPER EXTREMITY  5/15/2013    Procedure: LIGATE FISTULA ARTERIOVENOUS UPPER EXTREMITY;  Right  Arm Arteriovenous Fistula Ligation;  Surgeon: Claudy Hassan MD;  Location: [facility] OR     PARACENTESIS       TRANSPLANT LIVER, KIDNEY RECIPIENT  DONOR, COMBINED  2013    Procedure: COMBINED TRANSPLANT LIVER, KIDNEY RECIPIENT  DONOR;  Liver Tranplant  Donor, Kidney Transplant  Donor to Right Iliac Fossa, Intraoperative Dialysis, Placement of Ureteral Stent into transplanted ureter;  Surgeon: Claudy Hassan MD;  Location: [facility] OR     wisdom teeth extraction              Social History:     Social History     Social History     Marital status:      Spouse name: N/A     Number of children: N/A     Years of education: N/A     Occupational History     Not on file.     Social History Main Topics     Smoking status: Never Smoker     Smokeless tobacco: Never Used     Alcohol use No      Comment: Occasional, rare     Drug use: No     Sexual activity: Yes     Partners: Female     Other Topics Concern     Not on file     Social History Narrative       Smoking: none  Living situation: with wife    Works as a consultant for new building construction           Family History:       Family History   Problem Relation Age of Onset     HEART DISEASE Father      Heart Attack, Stent placed     Hypertension Father      Allergies Son      Thyroid Disease Sister      HEART DISEASE Paternal Grandfather      Hypertension Paternal Grandfather        Family history of blood clot: Father with history of PE following open heart surgery  Family history of bleeding  problems: none  Family history of anesthetic complications: none         Medications:     Current Outpatient Prescriptions   Medication Sig     amLODIPine (NORVASC) 5 MG tablet Take 5 mg by mouth daily     aspirin 81 MG chewable tablet Take 1 tablet by mouth daily.     metoprolol (LOPRESSOR) 50 MG tablet Take 50 mg by mouth 2 times daily 75 mg in am and 50 mg     mycophenolate (GENERIC EQUIVALENT) 250 MG capsule Take 1 capsule (250 mg) by mouth 2 times daily with 500 mg caps for a total of 750 mg twice daily     mycophenolate (GENERIC EQUIVALENT) 500 MG tablet Take 1 tablet (500 mg) by mouth 2 times daily with 250 mg caps for a total of 750 mg twice daily     sildenafil (VIAGRA) 50 MG tablet Take 0.5-1 tablets (25-50 mg) by mouth daily as needed for erectile dysfunction Take 30 min to 4 hours before intercourse.  Never use with nitroglycerin, terazosin or doxazosin.     sirolimus (GENERIC EQUIVALENT) 0.5 MG tablet Take 2 tablets (1 mg) by mouth daily     tamsulosin (FLOMAX) 0.4 MG capsule Take 1 capsule (0.4 mg) by mouth daily     No current facility-administered medications for this visit.             Review of Systems:   A comprehensive 10 point review of systems (constitutional, ENT, cardiac, peripheral vascular, lymphatic, respiratory, GI, , Musculoskeletal, skin, Neurological) was performed and found to be negative except as described in this note.     Also see intake form completed by patient.             Physical Exam:     EXAMINATION pertinent findings:   VITAL SIGNS: There were no vitals taken for this visit.  There is no height or weight on file to calculate BMI.  GEN: AOx3, cooperative, no distress  RESP: non labored breathing   ABD: benign   SKIN: grossly normal   LYMPHATIC: grossly normal   NEURO: grossly normal   VASCULAR: satisfactory perfusion of all extremities  MUSCULOSKELETAL:   Right hip nontender to palpation over trochanteric bursa.  Range of motion full extension to 90  of hip flexion.   External rotation 30 .  Minimal internal rotation.  Increasing pain with internal rotation as well as extremes of motion.  No knee pain with full knee range of motion.   Full strength with hip abduction and abduction.  5/5 EHL/FHL/TA/GSC.   Diminished sensation distal to the knee, equal to the opposite side.  2+ pedal and posterior tib pulse.  Toes warm and well perfused.         Data:   Imaging:   AP pelvis and lateral bilateral hips with right hip with sclerosis of the superior aspect of the femoral head and collapse on the right side.  Osteophyte formation.  Joint space narrowing.     Left hip with joint space well preserved no evidence of necrosis or collapse.           Assessment and Plan:   Assessment:  48-year-old gentleman with AVN of the right hip is an appropriate candidate for total hip arthroplasty.     Plan:  After we discussed the risks and benefits of the planned procedure, patient elected to proceed.  We will schedule at his convenience.  he will need a medicine preop.  We have contacted his transplant team to see if they have any recommendations regarding his antirejection medications at the time of surgery.      This patient was seen and discussed with Dr. Nima Scott and he is in agreement with this plan         Attending MD (Dr. Nima Scott) :  I performed the history and physical exam of the patient and discussed the patient's management with the resident and patient. I reviewed the above note and agree with the documented findings and plan of care, which I communicated to the patient.      Jarod is a very pleasant 48-year-old man with a history of liver and kidney transplant. He has a history of high-dose steroids. He has right hip osteonecrosis. His normal activities are severely limited due to the severity of his osteonecrosis. He is otherwise medically optimized and doing very well with regards to his liver transplant. Given the impact the osteonecrosis is having and slight varus benefits  of surgical and nonsurgical treatment. We discussed total hip replacement detail including the procedure, the recovery process, the long-term outcomes. We also reviewed the competitions of surgery. Reviewed the risk of infection, wound healing problems, persistent pain, dislocation, fracture, neurovascular injury, limb length discrepancy. We discussed that given his history of transplant and immunosuppressive medication use she is likely in a slightly higher risk of infection. Nonetheless based on our discussion the benefits of surgery outweigh these risks and he would like to proceed with surgical treatment. We will work on planning surgery to time and works well for him in the next couple months. I sent a note to his transplant team for assistance in management of his immunosuppressive medications.    Nima Scott M.D.     Arthritis and Joint Replacement  Department of Orthopaedic Surgery, [facility]  [email or web address]  [phone] (pager)      Again, thank you for allowing me to participate in the care of your patient.      Sincerely,    Nima Scott MD

## 2018-08-08 NOTE — PROGRESS NOTES
[facility], Orthopaedic Surgery, Arthritis, Hip and Knee Replacement    Jarod Obregon MRN# [id number]   Age: 48 year old YOB: 1970     Requesting physician: Jeannine Malik*              History of Present Illness:   Jarod Obregon is a 48 year old year old male with history of liver and kidney transplant in 2013 secondary to hepatorenal syndrome and alcoholic cirrhosis, who presents today for evaluation and management of right hip pain.  He has had 2 years of right hip pain but it has gotten progressively worse over the last couple months he was seen by another orthopedist in May 2018 and diagnosed with avascular necrosis of the right hip.  He was referred to physical therapy but did not find that helpful.  His symptoms do wax and wane but overall he finds that he is having more frequent episodes of more severe pain.  He uses the cane when pain is worse.  He has difficulty getting out of chairs or cars and going up stairs.  He has nighttime pain and inability to lay on the left side as of right hip discomfort     He does not take Tylenol or ibuprofen due to history of transplant.  He does not take any narcotic pain medication.  He has not had any steroid injections.    Patient is on antirejection medication but he is not on steroids.    Background history:  DX:  1. Right hip avascular necrosis    TREATMENTS:  1. None             Past Medical History:     Patient Active Problem List   Diagnosis     Generalized anxiety disorder     Kidney replaced by transplant     Liver replaced by transplant (H)     High risk medications (not anticoagulants) long-term use     Immunosuppressed status (H)     Hypertension     Anemia in chronic renal disease     Secondary renal hyperparathyroidism (H)     Aftercare following organ transplant     Past Medical History:   Diagnosis Date     Alcoholic cirrhosis of liver (H)      Anemia      Anemia in chronic kidney disease(285.21)      Anxiety       Ascites      Bleeding disorder (H)      Chronic infection     VRE+     Dialysis care      Dialysis patient (H)      Dizziness      Elevated blood pressure reading without diagnosis of hypertension      End stage renal disease (H)      ESRD (end stage renal disease) (H)      Generalized anxiety disorder      Generalized anxiety disorder      GI bleed      GI bleeding      Hepatic encephalopathy (H)      Hepatorenal syndrome (H)      High risk medications (not anticoagulants) long-term use      History of blood transfusion      Hydrocele      Hypertension      Hypogonadism male      Hypotension      Immunosuppressed status (H)      Insomnia with sleep apnea, unspecified      Kidney replaced by transplant      Liver failure (H)      Liver replaced by transplant (H)      Myopathy      Obesity      Other and unspecified alcohol dependence, unspecified drinking behavior      Renal failure      Secondary hyperparathyroidism (of renal origin)      Sleep apnea     mild in past, improved with weight loss     Thrombocytopenia (H)      Unspecified vitamin D deficiency      Prior history of blood clot: none  Prior history of bleeding problems: none  Prior history of anesthetic complications: none  Currently on opioids:  none  History of Diabetes (if so, recent A1c): no           Past Surgical History:     Past Surgical History:   Procedure Laterality Date     BENCH KIDNEY  1/14/2013    Procedure: BENCH KIDNEY;;  Surgeon: Claudy Hassan MD;  Location: [facility] OR     BENCH LIVER  1/14/2013    Procedure: BENCH LIVER;;  Surgeon: Claudy Hassan MD;  Location: [facility] OR     CHOLECYSTECTOMY       COLONOSCOPY       CYSTOSCOPY, REMOVE STENT(S), COMBINED  2/25/2013    Procedure: COMBINED CYSTOSCOPY, REMOVE STENT(S);  Flexible Cystoscopy, Removal Right Ureteral Stent;  Surgeon: Claudy Hassan MD;  Location: [facility] OR     diaylysis shunt       ENDOSCOPY       ENT SURGERY      sinus surgery     HERNIA REPAIR      I believe this was done  during tranplant surgery     LIGATE FISTULA ARTERIOVENOUS UPPER EXTREMITY  5/15/2013    Procedure: LIGATE FISTULA ARTERIOVENOUS UPPER EXTREMITY;  Right  Arm Arteriovenous Fistula Ligation;  Surgeon: Claudy Hassan MD;  Location: [facility] OR     PARACENTESIS       TRANSPLANT LIVER, KIDNEY RECIPIENT  DONOR, COMBINED  2013    Procedure: COMBINED TRANSPLANT LIVER, KIDNEY RECIPIENT  DONOR;  Liver Tranplant  Donor, Kidney Transplant  Donor to Right Iliac Fossa, Intraoperative Dialysis, Placement of Ureteral Stent into transplanted ureter;  Surgeon: Claudy Hassan MD;  Location: [facility] OR     wisdom teeth extraction              Social History:     Social History     Social History     Marital status:      Spouse name: N/A     Number of children: N/A     Years of education: N/A     Occupational History     Not on file.     Social History Main Topics     Smoking status: Never Smoker     Smokeless tobacco: Never Used     Alcohol use No      Comment: Occasional, rare     Drug use: No     Sexual activity: Yes     Partners: Female     Other Topics Concern     Not on file     Social History Narrative       Smoking: none  Living situation: with wife    Works as a consultant for new building construction           Family History:       Family History   Problem Relation Age of Onset     HEART DISEASE Father      Heart Attack, Stent placed     Hypertension Father      Allergies Son      Thyroid Disease Sister      HEART DISEASE Paternal Grandfather      Hypertension Paternal Grandfather        Family history of blood clot: Father with history of PE following open heart surgery  Family history of bleeding problems: none  Family history of anesthetic complications: none         Medications:     Current Outpatient Prescriptions   Medication Sig     amLODIPine (NORVASC) 5 MG tablet Take 5 mg by mouth daily     aspirin 81 MG chewable tablet Take 1 tablet by mouth daily.     metoprolol (LOPRESSOR)  50 MG tablet Take 50 mg by mouth 2 times daily 75 mg in am and 50 mg     mycophenolate (GENERIC EQUIVALENT) 250 MG capsule Take 1 capsule (250 mg) by mouth 2 times daily with 500 mg caps for a total of 750 mg twice daily     mycophenolate (GENERIC EQUIVALENT) 500 MG tablet Take 1 tablet (500 mg) by mouth 2 times daily with 250 mg caps for a total of 750 mg twice daily     sildenafil (VIAGRA) 50 MG tablet Take 0.5-1 tablets (25-50 mg) by mouth daily as needed for erectile dysfunction Take 30 min to 4 hours before intercourse.  Never use with nitroglycerin, terazosin or doxazosin.     sirolimus (GENERIC EQUIVALENT) 0.5 MG tablet Take 2 tablets (1 mg) by mouth daily     tamsulosin (FLOMAX) 0.4 MG capsule Take 1 capsule (0.4 mg) by mouth daily     No current facility-administered medications for this visit.             Review of Systems:   A comprehensive 10 point review of systems (constitutional, ENT, cardiac, peripheral vascular, lymphatic, respiratory, GI, , Musculoskeletal, skin, Neurological) was performed and found to be negative except as described in this note.     Also see intake form completed by patient.             Physical Exam:     EXAMINATION pertinent findings:   VITAL SIGNS: There were no vitals taken for this visit.  There is no height or weight on file to calculate BMI.  GEN: AOx3, cooperative, no distress  RESP: non labored breathing   ABD: benign   SKIN: grossly normal   LYMPHATIC: grossly normal   NEURO: grossly normal   VASCULAR: satisfactory perfusion of all extremities  MUSCULOSKELETAL:   Right hip nontender to palpation over trochanteric bursa.  Range of motion full extension to 90  of hip flexion.  External rotation 30 .  Minimal internal rotation.  Increasing pain with internal rotation as well as extremes of motion.  No knee pain with full knee range of motion.   Full strength with hip abduction and abduction.  5/5 EHL/FHL/TA/GSC.   Diminished sensation distal to the knee, equal to the  opposite side.  2+ pedal and posterior tib pulse.  Toes warm and well perfused.         Data:   Imaging:   AP pelvis and lateral bilateral hips with right hip with sclerosis of the superior aspect of the femoral head and collapse on the right side.  Osteophyte formation.  Joint space narrowing.     Left hip with joint space well preserved no evidence of necrosis or collapse.           Assessment and Plan:   Assessment:  48-year-old gentleman with AVN of the right hip is an appropriate candidate for total hip arthroplasty.     Plan:  After we discussed the risks and benefits of the planned procedure, patient elected to proceed.  We will schedule at his convenience.  he will need a medicine preop.  We have contacted his transplant team to see if they have any recommendations regarding his antirejection medications at the time of surgery.      This patient was seen and discussed with Dr. Nima Scott and he is in agreement with this plan         Attending MD (Dr. Nima Scott) :  I performed the history and physical exam of the patient and discussed the patient's management with the resident and patient. I reviewed the above note and agree with the documented findings and plan of care, which I communicated to the patient.      Jarod is a very pleasant 48-year-old man with a history of liver and kidney transplant. He has a history of high-dose steroids. He has right hip osteonecrosis. His normal activities are severely limited due to the severity of his osteonecrosis. He is otherwise medically optimized and doing very well with regards to his liver transplant. Given the impact the osteonecrosis is having and slight varus benefits of surgical and nonsurgical treatment. We discussed total hip replacement detail including the procedure, the recovery process, the long-term outcomes. We also reviewed the competitions of surgery. Reviewed the risk of infection, wound healing problems, persistent pain, dislocation, fracture,  neurovascular injury, limb length discrepancy. We discussed that given his history of transplant and immunosuppressive medication use she is likely in a slightly higher risk of infection. Nonetheless based on our discussion the benefits of surgery outweigh these risks and he would like to proceed with surgical treatment. We will work on planning surgery to time and works well for him in the next couple months. I sent a note to his transplant team for assistance in management of his immunosuppressive medications.    Nima Scott M.D.     Arthritis and Joint Replacement  Department of Orthopaedic Surgery, [facility]  [email or web address]  [phone] (pager)

## 2018-08-08 NOTE — NURSING NOTE
Teaching Flowsheet   Relevant Diagnosis: Right AVN of femoral head.  Teaching Topic: Right total hip arthroplasty    Patient is a transplant patient (liver and kidney). He lives with his wife in [place]. Patient will see the PAC clinic for preoperative clearance.     Person(s) involved in teaching:   Patient     Motivation Level:  Asks Questions: Yes  Eager to Learn: Yes  Cooperative: Yes  Receptive (willing/able to accept information): Yes  Any cultural factors/Episcopal beliefs that may influence understanding or compliance? No     Patient demonstrates understanding of the following:  Reason for the appointment, diagnosis and treatment plan: Yes  Knowledge of proper use of medications and conditions for which they are ordered (with special attention to potential side effects or drug interactions): Yes  Which situations necessitate calling provider and whom to contact: Yes     Teaching Concerns Addressed: Patient will contact his transplant coordinator regarding his upcoming surgery and to get any recommendations regarding his transplant meds.     Proper use and care of assistive devices. (medical equip, care aids, etc.): Yes  Nutritional needs and diet plan: NA  Pain management techniques: Yes  Wound Care: Yes  How and/when to access community resources: Yes     Instructional Materials Used/Given: Preoperative teaching packet, dental card, capnography flyer, total joint class booklet and flyer. Patient was not given surgical soap due to past reaction to chlorhexidine. He will use Dial soap instead.

## 2018-08-08 NOTE — MR AVS SNAPSHOT
After Visit Summary   8/8/2018    Jarod Obregon    MRN: [id number]           Patient Information     Date Of Birth          1970        Visit Information        Provider Department      8/8/2018 4:00 PM Nima Scott MD [facility]        Today's Diagnoses     Osteonecrosis of right hip (H)    -  1       Follow-ups after your visit        Additional Services     PAC Visit Referral (For [facility] Only)                 Your next 10 appointments already scheduled     Oct 09, 2018  9:00 AM CDT   (Arrive by 8:45 AM)   PAC EVALUATION with KORY Wilson   [facility] ([facility])    [address] [place] [id number] [phone]            Oct 09, 2018 10:00 AM CDT   (Arrive by 9:45 AM)   PAC RN ASSESSMENT with  Pac Rn   [facility] Preoperative [facility] [place] ([facility])    [address] [place] [id number] [phone]            Oct 09, 2018 10:30 AM CDT   (Arrive by 10:15 AM)   PAC Anesthesia Consult with  Pac Anesthesiologist   [facility]operative [facility] [place] ([facility])    [address] [place] [id number] [phone]            Oct 30, 2018   Procedure with Nima Scott MD   [facility], [place], Same Day Surgery (--)    [address] [facility] [address]   [phone]            Nov 16, 2018 11:00 AM CST   (Arrive by 10:45 AM)   Post-Op with Nima Metzger PA-C   [facility] Orthopaedic [facility] ([facility])    [address] [place] [id number] [phone]            Dec 12, 2018 11:30 AM CST   (Arrive by 11:15 AM)   RETURN HIP with Nima Scott MD   [facility] Orthopa[facility] ([facility])    [address] [place] [id number] [phone]             Jan 07, 2019  8:00 AM CST   LAB with  LAB   [facility] ([facility])    [address]  1st Floor  [facility] [id number] [phone]           Please do not eat 10-12 hours before your appointment if you are coming in fasting for labs on lipids, cholesterol, or glucose (sugar). This does not apply to pregnant women. Water, hot tea and black coffee (with nothing added) are okay. Do not drink other fluids, diet soda or chew gum.            Jan 08, 2019 12:10 PM CST   (Arrive by 11:55 AM)   Return Liver Transplant with Jeannine Biggs MD   [facility] Hepatology ([facility])    [address]  [address]  [facility] [phone] [phone]            Jan 08, 2019  1:30 PM CST   (Arrive by 1:00 PM)   Return Kidney Transplant with  Kidney/Pancreas Recipient   [facility] Nephrology ([facility])    [address]  [address]  [facility] [id number] [phone]              Who to contact     Please call your clinic at [phone] to:    Ask questions about your health    Make or cancel appointments    Discuss your medicines    Learn about your test results    Speak to your doctor            Additional Information About Your Visit        Protecode Information     Protecode gives you secure access to your electronic health record. If you see a primary care provider, you can also send messages to your care team and make appointments. If you have questions, please call your primary care clinic.  If you do not have a primary care provider, please call [phone] and they will assist you.      Protecode is an electronic gateway that provides easy, online access to your medical records. With Protecode, you can request a clinic appointment, read your test results, renew a prescription or communicate with your care team.     To access your existing account, please contact your [facility]  Clinic or call [phone] for assistance.        Care EveryWhere ID     This is your Care EveryWhere ID. This could be used by other organizations to access your [facility] medical records  [id number]        Your Vitals Were     Height BMI (Body Mass Index)                1.829 m (6') 34.45 kg/m2           Blood Pressure from Last 3 Encounters:   01/26/18 147/86   01/15/18 148/90   01/27/17 (!) 133/95    Weight from Last 3 Encounters:   08/08/18 115.2 kg (254 lb)   01/26/18 111.3 kg (245 lb 6.4 oz)   01/15/18 113.4 kg (250 lb)              We Performed the Following     PAC Visit Referral (For [facility] Only)     Cece-Operative Worksheet        Primary Care Provider Office Phone # Fax #    Carrington Gil [phone]       [facility] [address] [place] [address]        Equal Access to Services     ROYCE GAMEZ : Hadii aad ku hadasho Soomaali, waaxda luqadaha, qaybta kaalmada adeegyada, waxay idiin hayaan heather multani . So [facility] [phone].    ATENCIÓN: Si habla español, tiene a membreno disposición servicios gratuitos de asistencia lingüística. Llame al [phone].    We comply with applicable federal civil rights laws and [place] laws. We do not discriminate on the basis of race, color, national origin, age, disability, sex, sexual orientation, or gender identity.            Thank you!     Thank you for choosing [facility]  for your care. Our goal is always to provide you with excellent care. Hearing back from our patients is one way we can continue to improve our services. Please take a few minutes to complete the written survey that you may receive in the mail after your visit with us. Thank you!             Your Updated Medication List - Protect others around you: Learn how to safely use, store and throw away your medicines at www.disposemymeds.org.          This list is accurate as of 8/8/18 11:59 PM.  Always use your most recent med list.                    Brand Name Dispense Instructions for use Diagnosis    amLODIPine 5 MG tablet    NORVASC     Take 5 mg by mouth daily        aspirin 81 MG chewable tablet     90 tablet    Take 1 tablet by mouth daily.    Liver transplanted (H)       metoprolol tartrate 50 MG tablet    LOPRESSOR     Take 50 mg by mouth 2 times daily 75 mg in am and 50 mg        * mycophenolate 250 MG capsule    GENERIC EQUIVALENT    180 capsule    Take 1 capsule (250 mg) by mouth 2 times daily with 500 mg caps for a total of 750 mg twice daily    Transplant       * mycophenolate 500 MG tablet    GENERIC EQUIVALENT    180 tablet    Take 1 tablet (500 mg) by mouth 2 times daily with 250 mg caps for a total of 750 mg twice daily    Transplant       sildenafil 50 MG tablet    VIAGRA    12 tablet    Take 0.5-1 tablets (25-50 mg) by mouth daily as needed for erectile dysfunction Take 30 min to 4 hours before intercourse.  Never use with nitroglycerin, terazosin or doxazosin.    ED (erectile dysfunction)       sirolimus 0.5 MG tablet    GENERIC EQUIVALENT    180 tablet    Take 2 tablets (1 mg) by mouth daily    Liver replaced by transplant (H)       tamsulosin 0.4 MG capsule    FLOMAX    30 capsule    Take 1 capsule (0.4 mg) by mouth daily    Nocturia       * Notice:  This list has 2 medication(s) that are the same as other medications prescribed for you. Read the directions carefully, and ask your doctor or other care provider to review them with you.

## 2018-08-08 NOTE — NURSING NOTE
Reason For Visit:   Chief Complaint   Patient presents with     Consult     AVN Right femoral head. Transplant patient.        Primary MD: Carrington Gil      Ht 1.829 m (6')  Wt 115.2 kg (254 lb)  BMI 34.45 kg/m2    Pain Assessment  Patient Currently in Pain: Yes  0-10 Pain Scale: 5  Primary Pain Location: Hip  Pain Orientation: Right  Pain Descriptors: Burning, Aching, Sharp, Shooting, Dull, Constant    Current Outpatient Prescriptions   Medication     amLODIPine (NORVASC) 5 MG tablet     aspirin 81 MG chewable tablet     metoprolol (LOPRESSOR) 50 MG tablet     mycophenolate (GENERIC EQUIVALENT) 250 MG capsule     mycophenolate (GENERIC EQUIVALENT) 500 MG tablet     sildenafil (VIAGRA) 50 MG tablet     sirolimus (GENERIC EQUIVALENT) 0.5 MG tablet     tamsulosin (FLOMAX) 0.4 MG capsule     No current facility-administered medications for this visit.           Allergies   Allergen Reactions     Paxil [Paroxetine] Other (See Comments)     Caused Severe Tremor     Chlorhexidine Rash     Phenol Rash           Niki Maradiaga LPN

## 2018-08-09 ENCOUNTER — TELEPHONE (OUTPATIENT)
Dept: TRANSPLANT | Facility: CLINIC | Age: 48
End: 2018-08-09

## 2018-08-09 NOTE — TELEPHONE ENCOUNTER
Staff message from Nima Scott letting me know that Jarod is going to be scheduled for right total hip replacement.  Jarod is 5 years post kidney and liver transplant.  His current IS regimen is cellcept 750 bid and sirolimus.  I spoke to Jarod and explained that we will need to have him off sirolimus approx 1 mo prior to surgery and probably a month post hip or until wound is healed.  Jarod is thinking surgery may be in Sept or October.  Note to Dr. Biggs (hepatology) and Ki (nephrololgy) for input on what to switch Jarod to.  I'm thinking CSA (level ) and cellcept?

## 2018-08-14 ENCOUNTER — TELEPHONE (OUTPATIENT)
Dept: ORTHOPEDICS | Facility: CLINIC | Age: 48
End: 2018-08-14

## 2018-08-14 NOTE — TELEPHONE ENCOUNTER
Patient is scheduled for surgery with Dr. Scott      Spoke or left message with: patient    Location: [place]    Date of Surgery: 10/30/18    Pre-op with surgeon (if applicable):     H&P: Scheduled with PAC on 10/9 at 9    Post op:     Special Equipment:     Additional imaging/appointments:     Surgery packet sent: was given in clinic    Additional comments:

## 2018-08-15 DIAGNOSIS — Z94.4 LIVER REPLACED BY TRANSPLANT (H): Primary | ICD-10-CM

## 2018-08-15 RX ORDER — CYCLOSPORINE 25 MG/1
150 CAPSULE, LIQUID FILLED ORAL EVERY 12 HOURS
Qty: 720 CAPSULE | Refills: 5 | Status: SHIPPED | OUTPATIENT
Start: 2018-08-15 | End: 2018-10-08

## 2018-08-15 NOTE — TELEPHONE ENCOUNTER
Talked to Jarod on the phone and he understands the process and time frame with switching from Sirolimus to CSA. His only issue is the [facility] and going through them. He would like the new prescription to go to the [facility] in [facility] in [place]. I told him I will do that and seeing though its a couple of months away we will find out if we can fill there or not and switch if necessary.

## 2018-08-16 ENCOUNTER — TELEPHONE (OUTPATIENT)
Dept: TRANSPLANT | Facility: CLINIC | Age: 48
End: 2018-08-16

## 2018-08-16 DIAGNOSIS — Z94.4 LIVER REPLACED BY TRANSPLANT (H): Primary | ICD-10-CM

## 2018-08-16 NOTE — TELEPHONE ENCOUNTER
Per dr. Emerson:         As Jarod comes off rapa the MMF dose should go up to 1000 mg po bid with a goal level of around 1 and under 3   Thanks      Massimo to notify patient, place lab order.

## 2018-08-16 NOTE — TELEPHONE ENCOUNTER
----- Message from Catherine Hutchinson RN sent at 8/16/2018  1:36 PM CDT -----  Regarding: FW: MMF dose   This is a change.  Please tell this to Jarod, place order for MPA level weekly x 3 and updated cellcept script.  thx  ----- Message -----     From: Jalen Emerson MD     Sent: 8/16/2018  11:56 AM       To: Catherine Hutchinson RN  Subject: MMF dose                                         Plan is excellent but needs a small modification   As Jarod comes off rapa the MMF dose should go up to 1000 mg po bid with a goal level of around 1 and under 3   Thanks  SR

## 2018-10-04 DIAGNOSIS — Z94.4 LIVER REPLACED BY TRANSPLANT (H): ICD-10-CM

## 2018-10-04 LAB
ALBUMIN SERPL-MCNC: 3.7 G/DL (ref 3.4–5)
ALBUMIN UR-MCNC: NEGATIVE MG/DL
ALP SERPL-CCNC: 62 U/L (ref 40–150)
ALT SERPL W P-5'-P-CCNC: 44 U/L (ref 0–70)
ANION GAP SERPL CALCULATED.3IONS-SCNC: 6 MMOL/L (ref 3–14)
APPEARANCE UR: CLEAR
AST SERPL W P-5'-P-CCNC: 22 U/L (ref 0–45)
BILIRUB DIRECT SERPL-MCNC: 0.4 MG/DL (ref 0–0.2)
BILIRUB SERPL-MCNC: 1.5 MG/DL (ref 0.2–1.3)
BILIRUB UR QL STRIP: NEGATIVE
BUN SERPL-MCNC: 19 MG/DL (ref 7–30)
CALCIUM SERPL-MCNC: 8.5 MG/DL (ref 8.5–10.1)
CHLORIDE SERPL-SCNC: 104 MMOL/L (ref 94–109)
CHOLEST SERPL-MCNC: 174 MG/DL
CO2 SERPL-SCNC: 26 MMOL/L (ref 20–32)
COLOR UR AUTO: YELLOW
CREAT SERPL-MCNC: 1.42 MG/DL (ref 0.66–1.25)
CREAT UR-MCNC: 205 MG/DL
CYCLOSPORINE BLD LC/MS/MS-MCNC: 115 UG/L (ref 50–400)
ERYTHROCYTE [DISTWIDTH] IN BLOOD BY AUTOMATED COUNT: 12.9 % (ref 10–15)
GFR SERPL CREATININE-BSD FRML MDRD: 53 ML/MIN/1.7M2
GLUCOSE SERPL-MCNC: 91 MG/DL (ref 70–99)
GLUCOSE UR STRIP-MCNC: NEGATIVE MG/DL
HCT VFR BLD AUTO: 50.6 % (ref 40–53)
HDLC SERPL-MCNC: 48 MG/DL
HGB BLD-MCNC: 15.9 G/DL (ref 13.3–17.7)
HGB UR QL STRIP: NEGATIVE
KETONES UR STRIP-MCNC: NEGATIVE MG/DL
LDLC SERPL CALC-MCNC: 109 MG/DL
LEUKOCYTE ESTERASE UR QL STRIP: NEGATIVE
MCH RBC QN AUTO: 26.4 PG (ref 26.5–33)
MCHC RBC AUTO-ENTMCNC: 31.4 G/DL (ref 31.5–36.5)
MCV RBC AUTO: 84 FL (ref 78–100)
MUCOUS THREADS #/AREA URNS LPF: PRESENT /LPF
NITRATE UR QL: NEGATIVE
NONHDLC SERPL-MCNC: 126 MG/DL
PH UR STRIP: 5 PH (ref 5–7)
PLATELET # BLD AUTO: 180 10E9/L (ref 150–450)
POTASSIUM SERPL-SCNC: 4.3 MMOL/L (ref 3.4–5.3)
PROT SERPL-MCNC: 7.2 G/DL (ref 6.8–8.8)
PROT UR-MCNC: 0.19 G/L
PROT/CREAT 24H UR: 0.09 G/G CR (ref 0–0.2)
RBC # BLD AUTO: 6.02 10E12/L (ref 4.4–5.9)
RBC #/AREA URNS AUTO: 1 /HPF (ref 0–2)
SIROLIMUS BLD-MCNC: 6.4 UG/L (ref 5–15)
SODIUM SERPL-SCNC: 136 MMOL/L (ref 133–144)
SOURCE: ABNORMAL
SP GR UR STRIP: 1.02 (ref 1–1.03)
SQUAMOUS #/AREA URNS AUTO: <1 /HPF (ref 0–1)
TME LAST DOSE: NORMAL H
TME LAST DOSE: NORMAL H
TRIGL SERPL-MCNC: 84 MG/DL
UROBILINOGEN UR STRIP-MCNC: 0 MG/DL (ref 0–2)
WBC # BLD AUTO: 4.8 10E9/L (ref 4–11)
WBC #/AREA URNS AUTO: 1 /HPF (ref 0–5)

## 2018-10-06 LAB
MYCOPHENOLATE SERPL LC/MS/MS-MCNC: 0.97 MG/L (ref 1–3.5)
MYCOPHENOLATE-G SERPL LC/MS/MS-MCNC: 70.7 MG/L (ref 30–95)
TME LAST DOSE: ABNORMAL H

## 2018-10-08 ENCOUNTER — PRE VISIT (OUTPATIENT)
Dept: SURGERY | Facility: CLINIC | Age: 48
End: 2018-10-08

## 2018-10-08 ENCOUNTER — TELEPHONE (OUTPATIENT)
Dept: TRANSPLANT | Facility: CLINIC | Age: 48
End: 2018-10-08

## 2018-10-08 DIAGNOSIS — Z94.4 LIVER REPLACED BY TRANSPLANT (H): ICD-10-CM

## 2018-10-08 LAB
ALBUMIN SERPL-MCNC: 3.9 G/DL (ref 3.4–5)
ALP SERPL-CCNC: 67 U/L (ref 40–150)
ALT SERPL W P-5'-P-CCNC: 42 U/L (ref 0–70)
ANION GAP SERPL CALCULATED.3IONS-SCNC: 7 MMOL/L (ref 3–14)
AST SERPL W P-5'-P-CCNC: 22 U/L (ref 0–45)
BILIRUB DIRECT SERPL-MCNC: 0.4 MG/DL (ref 0–0.2)
BILIRUB SERPL-MCNC: 1.7 MG/DL (ref 0.2–1.3)
BUN SERPL-MCNC: 16 MG/DL (ref 7–30)
CALCIUM SERPL-MCNC: 8.9 MG/DL (ref 8.5–10.1)
CHLORIDE SERPL-SCNC: 103 MMOL/L (ref 94–109)
CO2 SERPL-SCNC: 24 MMOL/L (ref 20–32)
CREAT SERPL-MCNC: 1.3 MG/DL (ref 0.66–1.25)
CYCLOSPORINE BLD LC/MS/MS-MCNC: 179 UG/L (ref 50–400)
ERYTHROCYTE [DISTWIDTH] IN BLOOD BY AUTOMATED COUNT: 13 % (ref 10–15)
GFR SERPL CREATININE-BSD FRML MDRD: 59 ML/MIN/1.7M2
GLUCOSE SERPL-MCNC: 89 MG/DL (ref 70–99)
HCT VFR BLD AUTO: 49.9 % (ref 40–53)
HGB BLD-MCNC: 16 G/DL (ref 13.3–17.7)
MCH RBC QN AUTO: 26.2 PG (ref 26.5–33)
MCHC RBC AUTO-ENTMCNC: 32.1 G/DL (ref 31.5–36.5)
MCV RBC AUTO: 82 FL (ref 78–100)
PLATELET # BLD AUTO: 199 10E9/L (ref 150–450)
POTASSIUM SERPL-SCNC: 4.2 MMOL/L (ref 3.4–5.3)
PROT SERPL-MCNC: 7.6 G/DL (ref 6.8–8.8)
RBC # BLD AUTO: 6.11 10E12/L (ref 4.4–5.9)
SIROLIMUS BLD-MCNC: 7.6 UG/L (ref 5–15)
SODIUM SERPL-SCNC: 134 MMOL/L (ref 133–144)
TME LAST DOSE: NORMAL H
TME LAST DOSE: NORMAL H
WBC # BLD AUTO: 5.3 10E9/L (ref 4–11)

## 2018-10-08 RX ORDER — CYCLOSPORINE 25 MG/1
100 CAPSULE, LIQUID FILLED ORAL EVERY 12 HOURS
Qty: 480 CAPSULE | Refills: 5 | Status: SHIPPED | OUTPATIENT
Start: 2018-10-08 | End: 2018-10-16

## 2018-10-08 NOTE — TELEPHONE ENCOUNTER
RECORDS RECEIVED FROM:   DATE RECEIVED:    NOTES STATUS DETAILS   OFFICE NOTE from Cardiologist N/A    OFFICE NOTE from Pulmonoligist N/A    MEDICATION LIST Internal    DIAGNOSTIC PROCEDURES     ECHO N/A    STRESS TEST N/A    PULMONARY FUNCTION TEST N/A    CORONARY ANGIOGRAM     EKG Internal    IMAGING (DISC & REPORT)      CHEST XRAY Internal

## 2018-10-08 NOTE — TELEPHONE ENCOUNTER
Cyclosporine level 179.    Decrease Neoral to 100 mg in the morning and 100 mg in the evening.   Repeat CSA level in 1-2 weeks.

## 2018-10-09 ENCOUNTER — APPOINTMENT (OUTPATIENT)
Dept: SURGERY | Facility: CLINIC | Age: 48
End: 2018-10-09
Payer: COMMERCIAL

## 2018-10-09 ENCOUNTER — ANESTHESIA EVENT (OUTPATIENT)
Dept: SURGERY | Facility: CLINIC | Age: 48
End: 2018-10-09

## 2018-10-09 ENCOUNTER — OFFICE VISIT (OUTPATIENT)
Dept: SURGERY | Facility: CLINIC | Age: 48
End: 2018-10-09
Payer: COMMERCIAL

## 2018-10-09 ENCOUNTER — TELEPHONE (OUTPATIENT)
Dept: TRANSPLANT | Facility: CLINIC | Age: 48
End: 2018-10-09

## 2018-10-09 ENCOUNTER — DOCUMENTATION ONLY (OUTPATIENT)
Dept: ORTHOPEDICS | Facility: CLINIC | Age: 48
End: 2018-10-09

## 2018-10-09 VITALS
TEMPERATURE: 98.1 F | HEIGHT: 72 IN | OXYGEN SATURATION: 98 % | DIASTOLIC BLOOD PRESSURE: 92 MMHG | SYSTOLIC BLOOD PRESSURE: 148 MMHG | HEART RATE: 63 BPM | WEIGHT: 267.1 LBS | BODY MASS INDEX: 36.18 KG/M2

## 2018-10-09 DIAGNOSIS — Z01.818 PREOP EXAMINATION: Primary | ICD-10-CM

## 2018-10-09 DIAGNOSIS — M87.00 AVASCULAR NECROSIS (H): ICD-10-CM

## 2018-10-09 RX ORDER — GABAPENTIN 100 MG/1
CAPSULE ORAL
COMMUNITY
End: 2022-01-03

## 2018-10-09 RX ORDER — MOMETASONE FUROATE 1 MG/G
OINTMENT TOPICAL 2 TIMES DAILY PRN
COMMUNITY
End: 2021-01-05

## 2018-10-09 RX ORDER — MULTIPLE VITAMINS W/ MINERALS TAB 9MG-400MCG
1 TAB ORAL DAILY
COMMUNITY

## 2018-10-09 ASSESSMENT — LIFESTYLE VARIABLES: TOBACCO_USE: 0

## 2018-10-09 NOTE — H&P
Pre-Operative H & P     CC:  Preoperative exam to assess for increased cardiopulmonary risk while undergoing surgery and anesthesia.    Date of Encounter: 10/9/2018  Primary Care Physician:  Carrington Gil  Reason for visit: Osteonecrosis of right hip (H) [M87.9]  - Primary   HPI  Jarod Obregon is a 48 year old male who presents for pre-operative H & P in preparation for right total hip arthroplasty with Dr. Scott on 10/30/18 at [facility]. History is obtained from the patient and wife.     Patient who was recently evaluated by Dr. Scott for right hip pain present for past two years. He attempted physical therapy but did not have lasting relief. His imaging revealed avascular necrosis and he was counseled for above procedure.   His history is otherwise significant for history of liver and kidney transplant in 2013 secondary to hepatorenal syndrome and alcoholic cirrhosis. He is followed by the transplant team, last seen in 1/2018 and considered stable on current immunosuppression. He is treated for HTN.   Past Medical History  Past Medical History:   Diagnosis Date     Alcoholic cirrhosis of liver (H)      Anemia      Anemia in chronic kidney disease(285.21)      Anxiety      Ascites      Bleeding disorder (H)      Chronic infection     VRE+     Dialysis care      Dialysis patient (H)      Dizziness      Elevated blood pressure reading without diagnosis of hypertension      End stage renal disease (H)      ESRD (end stage renal disease) (H)      Generalized anxiety disorder      Generalized anxiety disorder      GI bleed      GI bleeding      Hepatic encephalopathy (H)      Hepatorenal syndrome (H)      High risk medications (not anticoagulants) long-term use      History of blood transfusion      Hydrocele      Hypertension      Hypogonadism male      Hypotension      Immunosuppressed status (H)      Insomnia with sleep apnea, unspecified      Kidney  replaced by transplant      Liver failure (H)      Liver replaced by transplant (H)      Myopathy      Obesity      Other and unspecified alcohol dependence, unspecified drinking behavior      Renal failure      Secondary hyperparathyroidism (of renal origin)      Sleep apnea     mild in past, improved with weight loss     Thrombocytopenia (H)      Unspecified vitamin D deficiency        Past Surgical History  Past Surgical History:   Procedure Laterality Date     BENCH KIDNEY  2013    Procedure: BENCH KIDNEY;;  Surgeon: Claudy Hassan MD;  Location: [facility] OR     BENCH LIVER  2013    Procedure: BENCH LIVER;;  Surgeon: Claudy Hassan MD;  Location: [facility] OR     CHOLECYSTECTOMY       COLONOSCOPY       CYSTOSCOPY, REMOVE STENT(S), COMBINED  2013    Procedure: COMBINED CYSTOSCOPY, REMOVE STENT(S);  Flexible Cystoscopy, Removal Right Ureteral Stent;  Surgeon: Claudy Hassan MD;  Location: [facility] OR     diaylysis shunt       ENDOSCOPY       ENT SURGERY      sinus surgery     HERNIA REPAIR      I believe this was done during tranplant surgery     LIGATE FISTULA ARTERIOVENOUS UPPER EXTREMITY  5/15/2013    Procedure: LIGATE FISTULA ARTERIOVENOUS UPPER EXTREMITY;  Right  Arm Arteriovenous Fistula Ligation;  Surgeon: Claudy Hassan MD;  Location: [facility] OR     PARACENTESIS       TRANSPLANT LIVER, KIDNEY RECIPIENT  DONOR, COMBINED  2013    Procedure: COMBINED TRANSPLANT LIVER, KIDNEY RECIPIENT  DONOR;  Liver Tranplant  Donor, Kidney Transplant  Donor to Right Iliac Fossa, Intraoperative Dialysis, Placement of Ureteral Stent into transplanted ureter;  Surgeon: Claudy Hassan MD;  Location: [facility] OR     wisdom teeth extraction         Hx of Blood transfusions/reactions: Yes, multiple. No known reactions.      Hx of abnormal bleeding or anti-platelet use: ASA 81 mg daily, currently on hold.     Menstrual history: No LMP for male patient.    Steroid use in the last year: Unknown.      Personal or FH with difficulty with Anesthesia:  Denies.     Prior to Admission Medications  Current Outpatient Prescriptions   Medication Sig Dispense Refill     amLODIPine (NORVASC) 5 MG tablet Take 5 mg by mouth daily       CHOLECALCIFEROL PO Take 1 tablet by mouth daily       cycloSPORINE modified (GENERIC EQUIVALENT) 25 MG capsule Take 4 capsules (100 mg) by mouth every 12 hours 480 capsule 5     gabapentin (NEURONTIN) 100 MG capsule Take 5 capsules (500 mg) by mouth in the morning, take 3 capsules (300 mg) by mouth at noon, take 3 capsules (300 mg) by mouth in the evening.       metoprolol (LOPRESSOR) 50 MG tablet Take 75 mg by mouth in the morning and take 50 mg by mouth in the evening.       mometasone (ELOCON) 0.1 % ointment Apply topically 2 times daily as needed       multivitamin, therapeutic with minerals (MULTI-VITAMIN) TABS tablet Take 1 tablet by mouth daily       mycophenolate (GENERIC EQUIVALENT) 500 MG tablet Take 1 tablet (500 mg) by mouth 2 times daily with 250 mg caps for a total of 750 mg twice daily (Patient taking differently: Take 1,000 mg by mouth 2 times daily ) 180 tablet 3     sildenafil (VIAGRA) 50 MG tablet Take 0.5-1 tablets (25-50 mg) by mouth daily as needed for erectile dysfunction Take 30 min to 4 hours before intercourse.  Never use with nitroglycerin, terazosin or doxazosin. 12 tablet 11     aspirin 81 MG chewable tablet Take 1 tablet by mouth daily. (Patient not taking: Reported on 10/9/2018) 90 tablet 1     mycophenolate (GENERIC EQUIVALENT) 250 MG capsule Take 1 capsule (250 mg) by mouth 2 times daily with 500 mg caps for a total of 750 mg twice daily (Patient taking differently: Not currently taking these - using the 500 mg tablets - see other order) 180 capsule 3     tamsulosin (FLOMAX) 0.4 MG capsule Take 1 capsule (0.4 mg) by mouth daily (Patient not taking: Reported on 10/9/2018) 30 capsule 1       Allergies  Allergies   Allergen Reactions     Paxil [Paroxetine]  Other (See Comments)     Caused Severe Tremor     Cepacol Maximum Strength Rash     Chlorhexidine Rash     Phenol Rash       Social History  Social History     Social History     Marital status:      Spouse name: N/A     Number of children: N/A     Years of education: N/A     Occupational History     Not on file.     Social History Main Topics     Smoking status: Never Smoker     Smokeless tobacco: Never Used     Alcohol use No      Comment: Occasional, rare     Drug use: No     Sexual activity: Yes     Partners: Female     Other Topics Concern     Not on file     Social History Narrative       Family History  Family History   Problem Relation Age of Onset     HEART DISEASE Father      Heart Attack, Stent placed     Hypertension Father      Allergies Son      Thyroid Disease Sister      HEART DISEASE Paternal Grandfather      Hypertension Paternal Grandfather        Review of Systems  ROS/MED HX  The complete review of systems is negative other than noted in the HPI or here.   ENT/Pulmonary: Comment: History of dental appliance for RAMONA. Not wearing at this time. Denies symptoms.    (+)sleep apnea, doesn't use CPAP , . .   (-) tobacco use   Neurologic:     (+)neuropathy - significant knee to toe,     Cardiovascular:     (+) hypertension-range: 120-140/80-90, ---. Taking blood thinners Pt has received instructions: Instructions Given to patient: On hold for surgery. . . :. . Previous cardiac testing date:results:date: results:ECG reviewed date:10/9/18 results:SB with premature supraventricular complexes date: results:          METS/Exercise Tolerance:  3 - Able to walk 1-2 blocks without stopping   Hematologic:  - neg hematologic  ROS   (+) Anemia, History of Transfusion no previous transfusion reaction -      Musculoskeletal:   (+) , , other musculoskeletal- right hip AVN      GI/Hepatic:     (+) liver disease (Hx liver transplant.  good fxn.),       Renal/Genitourinary: Comment: Very good allograft function.   "  (+) chronic renal disease, type: ESRD, Pt does not require dialysis, Pt has history of transplant, date: 2013,       Endo:     (+) Obesity, .      Psychiatric:  - neg psychiatric ROS       Infectious Disease:  - neg infectious disease ROS       Malignancy:      - no malignancy   Other:    (+) No chance of pregnancy C-spine cleared: N/A, H/O Chronic Pain,no other significant disability        Physical Exam  Normal systems: dental    Airway   Mallampati: I  TM distance: >3 FB  Neck ROM: limited    Temp: 98.1  F (36.7  C) Temp src: Oral BP: (!) 148/92 Pulse: 63     SpO2: 98 %         267 lbs 1.6 oz  6' 0\"   Body mass index is 36.23 kg/(m^2).       Physical Exam  Constitutional: Awake, alert, cooperative, no apparent distress, and appears stated age. Accompanied by wife.   Eyes: Pupils equal, round and reactive to light, extra ocular muscles intact, sclera clear, conjunctiva normal. Glasses on.  HENT: Normocephalic, oral pharynx with moist mucus membranes, good dentition. No goiter appreciated.   Respiratory: Clear to auscultation bilaterally, no crackles or wheezing. No cough or obvious dyspnea.  Cardiovascular: Regular rate and rhythm, normal S1 and S2, and no murmur noted.  Carotids +2, no bruits. No edema. Palpable pulses to radial  DP and PT arteries.   GI: Normal bowel sounds, soft, non-distended, non-tender, soft incisional hernia right abdomen. Well healed incisions.  Lymph/Hematologic: No cervical lymphadenopathy and no supraclavicular lymphadenopathy.  Genitourinary: Deferred.   Skin: Warm and dry.  No rashes at anticipated surgical site.   Musculoskeletal: Limited ROM of neck. There is no redness, warmth, or swelling of the joints. Gross motor strength is normal.    Neurologic: Awake, alert, oriented to name, place and time. Cranial nerves II-XII are grossly intact. Gait is irregular. Walking with cane.  Neuropsychiatric: Calm, cooperative. Normal affect.     Labs: (personally reviewed)  Lab Results "   Component Value Date    WBC 5.3 10/08/2018     Lab Results   Component Value Date    RBC 6.11 10/08/2018     Lab Results   Component Value Date    HGB 16.0 10/08/2018     Lab Results   Component Value Date    HCT 49.9 10/08/2018     Lab Results   Component Value Date    MCV 82 10/08/2018     Lab Results   Component Value Date    MCH 26.2 10/08/2018     Lab Results   Component Value Date    MCHC 32.1 10/08/2018     Lab Results   Component Value Date    RDW 13.0 10/08/2018     Lab Results   Component Value Date     10/08/2018     Last Comprehensive Metabolic Panel:  Sodium   Date Value Ref Range Status   10/08/2018 134 133 - 144 mmol/L Final     Potassium   Date Value Ref Range Status   10/08/2018 4.2 3.4 - 5.3 mmol/L Final     Chloride   Date Value Ref Range Status   10/08/2018 103 94 - 109 mmol/L Final     Carbon Dioxide   Date Value Ref Range Status   10/08/2018 24 20 - 32 mmol/L Final     Anion Gap   Date Value Ref Range Status   10/08/2018 7 3 - 14 mmol/L Final     Glucose   Date Value Ref Range Status   10/08/2018 89 70 - 99 mg/dL Final     Urea Nitrogen   Date Value Ref Range Status   10/08/2018 16 7 - 30 mg/dL Final     Creatinine   Date Value Ref Range Status   10/08/2018 1.30 (H) 0.66 - 1.25 mg/dL Final     GFR Estimate   Date Value Ref Range Status   10/08/2018 59 (L) >60 mL/min/1.7m2 Final     Comment:     Non  GFR Calc     Calcium   Date Value Ref Range Status   10/08/2018 8.9 8.5 - 10.1 mg/dL Final     Lab Results   Component Value Date    AST 22 10/08/2018     Lab Results   Component Value Date    ALT 42 10/08/2018     Lab Results   Component Value Date    BILICONJ 0.0 07/25/2014      Lab Results   Component Value Date    BILITOTAL 1.7 10/08/2018     Lab Results   Component Value Date    ALBUMIN 3.9 10/08/2018     Lab Results   Component Value Date    PROTTOTAL 7.6 10/08/2018      Lab Results   Component Value Date    ALKPHOS 67 10/08/2018     UA RESULTS: 10/4/18  Recent Labs    Lab Test  10/04/18   0838   COLOR  Yellow   APPEARANCE  Clear   URINEGLC  Negative   URINEBILI  Negative   URINEKETONE  Negative   SG  1.017   UBLD  Negative   URINEPH  5.0   PROTEIN  Negative   NITRITE  Negative   LEUKEST  Negative   RBCU  1   WBCU  1     EKG: Personally reviewed but formal cardiology read pending: 10/9/18 Sinus bradycardia with premature supraventricular complexes  Stress test: DSE 2012  Interpretation Summary  The EKG portion of this stress test was negative for inducible ischemia (see   echo results below). Normal resting wall motion and no stress-induced wall   motion abnormality.  Xray pelvis 8/8/18  IMPRESSION: Prominent changes of osteonecrosis about the right hip  with medial and lateral areas of prominent subchondral collapse. No  radiographic evidence of osteonecrosis on the left  Imaging and cardiac testing reviewed by this provider  Outside records reviewed from: Care Everywhere    ASSESSMENT and PLAN  Jarod Obregon is a 48 year old male scheduled to undergo right total hip arthroplasty with Dr. Scott on 10/30/18. He has the following specific operative considerations:   - RCRI : No serious cardiac risks.    - Anesthesia considerations:  Refer to PAC assessment in anesthesia records  - VTE risk: 0.5%  - RAMONA # of risks 4/8 = Intermediate risk  - Risk of PONV score = 2.  If > 2, anti-emetic intervention recommended.     --Avascular necrosis of right hip. Above procedure now planned. Spinal preferred by surgeon. Final counseling and decisions by Anesthesia on DOS.   --HTN. Will take Amlodipine and Metoprolol on DOS. No other cardiac history or symptoms. ASA 81 mg daily on hold for surgery. Testing above. Activity limited by pain.    --Nonsmoker. No pulmonary symptoms. History of RAMONA prior to transplant with dental appliance. Is not using at this time. Denies symptoms except for some mild snoring.    --History of renal and liver transplants in 2013, stable on immunosuppression.  Will take Cyclosporine and mycophenolate on DOS. Cr 1.30. GFR 59   --Significant neuropathy from knees down bilaterally. Will take Gabapentin on DOS.    --History of blood transfusion. Type and screen to be drawn with patient's scheduled labs on 10/15/18.   --Enhanced recovery pathway initiated.  Arrival time, NPO, shower and medication instructions provided by nursing staff today. Preparing For Your Surgery handout given.  Patient was discussed with Dr Gallardo.    KORY Wilson CNS  Preoperative Assessment Center  [facility]  [facility]  Phone: [phone]  Fax: [phone]

## 2018-10-09 NOTE — MR AVS SNAPSHOT
After Visit Summary   10/9/2018    Jarod Obregon    MRN: [id number]           Patient Information     Date Of Birth          1970        Visit Information        Provider Department      10/9/2018 9:00 AM Sujey Woodruff APRN [facility]        Care Instructions    Preparing for Your Surgery      Name:  Jarod Obregon   MRN:  [id number]   :  1970   Today's Date:  10/9/2018     Arriving for surgery:  Surgery date:  10/30/18  Arrival time:  6:00AM  Please come to:     [facility] [address]  [address] [place]  [address]    - parking is available in front of [facility] from 5:15AM to 8:00PM. If you prefer, park your car in the Green Lot.    -Proceed to the 3rd floor, check in at the Adult Surgery Waiting Lounge. [phone]    If an escort is needed stop at the Information Desk in the lobby. Inform the information person that you are here for surgery. An escort to the Adult Surgery Waiting Lounge will be provided.        What can I eat or drink?  -  You may have solid food or milk products until 8 hours prior to your surgery. Midnight  -  You may have water, apple juice or 7up/Sprite until 2 hours prior to your surgery. 10/30/18, 6:00AM    Which medicines can I take?  Stop Aspirin, Multivitamins one week prior to surgery.  -  Do NOT take these medications in the morning, the day of surgery:     Vitamin D    -  Please take these medications the day of surgery:    Amlodipine  Cyclosporine   Gabapentin  Metoprolol   Mycophenolate Tamsulosin (Flomax)    How do I prepare myself?  -  Take two showers: one the night before surgery; and one the morning of surgery.         Use Scrubcare or Hibiclens to wash from neck down.  You may use your own shampoo and conditioner. No other hair products.   -  Do NOT use lotion, powder, deodorant, or antiperspirant the day of your surgery.  -  Do NOT  wear jewelry.  -  Begin using Incentive Spirometer 1 week prior to surgery.  Use 4 times per day, up to 5 breaths each time.  Bring Incentive Spirometer to hospital.  - Do not bring your own medications to the hospital, except for inhalers and eye drops.  -  Bring your ID and insurance card.    Questions or Concerns:  -If you have questions or concerns regarding the day of surgery, please call [phone].     -For questions after surgery please call your surgeons office.         Enhanced Recovery After Surgery     This is a team effort, including you, to get you back on your feet, eating and drinking normally and out of the hospital as quickly as possible.  The goals are: 1) NO INFECTIONS and   2) RETURN TO NORMAL DIET    How can we achieve these goals?  1) STAY ACTIVE: Walk every day before your surgery; try to increase the amount every day.  Walk after surgery as much as you can-the nurses will help you.  Walking speeds healing and gets you home quicker, you heal better at home and have less risk of infection.     2) INCENTIVE SPIROMETER: Practice your incentive spirometer 4 times per day with 5 repetitions each time.  Using the incentive spirometer can strengthen your muscles between your ribs and help you have a strong cough after surgery.  A more effective cough can help prevent problems with your lungs.    3) STAY HYDRATED: Drink clear liquids up until 2 hours before your surgery. We would like you to purchase a drink such as Gatorade or Ensure Clear (not the milkshake type).  Drink this before bedtime and on the way into the hospital, drink between 8-12 ounces or until you feel hydrated.  Keeping well hydrated leads to your veins being plump, you wake up faster, and you are less likely to be nauseated. Start drinking water as soon as you can after surgery and advance to clear liquids and food as tolerated.  IV fluids contain salt, drinking fluids will minimize the amount of IV fluids you need and decrease  the amount of salt you get.    The most common reason for the patient to be readmitted is dehydration. Staying hydrated after you go home from the hospital is very important.  Ensure or Ensure Clear are good options to keep you hydrated.     4) PAIN MANAGEMENT: If we minimize the amount of opioids and narcotics, and use regional blocks (which numb the area where your surgery is) along with oral pain medications; you will have less side effects of nausea and constipation. Narcotics can slow down your bowels and cause you to stay in the hospital longer.     Our goal is to keep you comfortable; eating and drinking normally and back home safely.     AFTER YOUR SURGERY  Breathing exercises   Breathing exercises help you recover faster. Take deep breaths and let the air out slowly. This will:     Help you wake up after surgery.    Help prevent complications like pneumonia.  Preventing complications will help you go home sooner.   We may give you a breathing device (incentive spirometer) to encourage you to breathe deeply.   Nausea and vomiting   You may feel sick to your stomach after surgery; if so, let your nurse know.    Pain control:  After surgery, you may have pain. Our goal is to help you manage your pain. Pain medicine will help you feel comfortable enough to do activities that will help you heal.  These activities may include breathing exercises, walking and physical therapy.   To help your health care team treat your pain we will ask: 1) If you have pain  2) where it is located 3) describe your pain in your words  Methods of pain control include medications given by mouth, vein or by nerve block for some surgeries.  Sequential Compression Device (SCD) or Pneumo Boots:  You may need to wear SCD S on your legs or feet. These are wraps connected to a machine that pumps in air and releases it. The repeated pumping helps prevent blood clots from forming.     Using an Incentive Spirometer    An incentive spirometer is  a device that helps you do deep breathing exercises. These exercises expand your lungs, aid in circulation, and help prevent pneumonia. Deep breathing exercises also help you breathe better and improve the function of your lungs by:    Keeping your lungs clear    Strengthening your breathing muscles    Helping prevent respiratory complications or problems  The incentive spirometer gives you a way to take an active part in your care. A nurse or therapist will teach you breathing exercises. To do these exercises, you will breathe in through your mouth and not your nose. The incentive spirometer only works correctly if you breathe in through your mouth.    Steps to clear lungs  Step 1. Exhale normally. Then, inhale normally.    Relax and breathe out.  Step 2. Place your lips tightly around the mouthpiece.    Make sure the device is upright and not tilted.  Step 3. Inhale as much air as you can through the mouthpiece (don't breath through your nose).    Inhale slowly and deeply.    Hold your breath long enough to keep the balls or disk raised for at least 3 to 5 seconds, or as instructed by your healthcare provider.  Step 4. Repeat the exercise regularly.    Begin using the Incentive Spirometer one week prior to your surgery, 4 times per day-5 times each.          Follow-ups after your visit        Your next 10 appointments already scheduled     Oct 09, 2018 10:00 AM CDT   (Arrive by 9:45 AM)   PAC RN ASSESSMENT with Mireya Pac Rn   [facility] [place] ([facility])    [address] [place] [address]   [phone]            Oct 09, 2018 10:30 AM CDT   (Arrive by 10:15 AM)   PAC Anesthesia Consult with Mireya Pac Anesthesiologist   [facility] Preoperative Assessment [place] ([facility])    [address] [place] [address]   [phone]            Oct 15, 2018  7:30 AM CDT   LAB with MIREYA LAB   [facility] (  [facility])    [address] [place] [id number]   [phone]           Please do not eat 10-12 hours before your appointment if you are coming in fasting for labs on lipids, cholesterol, or glucose (sugar). This does not apply to pregnant women. Water, hot tea and black coffee (with nothing added) are okay. Do not drink other fluids, diet soda or chew gum.            Oct 22, 2018  7:30 AM CDT   LAB with  LAB   [facility] ([facility])    [address] [place] [address]   [phone]           Please do not eat 10-12 hours before your appointment if you are coming in fasting for labs on lipids, cholesterol, or glucose (sugar). This does not apply to pregnant women. Water, hot tea and black coffee (with nothing added) are okay. Do not drink other fluids, diet soda or chew gum.            Oct 29, 2018  7:30 AM CDT   LAB with  LAB   [facility] ([facility])    [address] [place] [address]   [phone]           Please do not eat 10-12 hours before your appointment if you are coming in fasting for labs on lipids, cholesterol, or glucose (sugar). This does not apply to pregnant women. Water, hot tea and black coffee (with nothing added) are okay. Do not drink other fluids, diet soda or chew gum.            Oct 30, 2018   Procedure with Nima Scott MD   [facility] [place] Same Day Surgery (--)    [facility] [address]   [phone]            Nov 16, 2018 11:00 AM CST   (Arrive by 10:45 AM)   Post-Op with Nima Metzger PA-C   [facility] ([facility])    [address] [place] [id number] [phone]            Dec 12, 2018 11:30 AM CST   (Arrive by 11:15 AM)   RETURN HIP with Nima Scott MD   [facility] ([facility]  [place]    [address] [facility]  4th Floor  [facility] [id number] [phone]            Jan 07, 2019  8:00 AM CST   LAB with  LAB   [facility] ([facility])    [address] [place] [address]  1st Floor  [facility] [id number] [phone]           Please do not eat 10-12 hours before your appointment if you are coming in fasting for labs on lipids, cholesterol, or glucose (sugar). This does not apply to pregnant women. Water, hot tea and black coffee (with nothing added) are okay. Do not drink other fluids, diet soda or chew gum.            Jan 08, 2019 12:10 PM CST   (Arrive by 11:55 AM)   Return Liver Transplant with Jeannine Biggs MD   [facility] ([facility])    [address] [place] [address]  [address]  [facility] [id number] [phone]              Who to contact     Please call your clinic at [phone] to:    Ask questions about your health    Make or cancel appointments    Discuss your medicines    Learn about your test results    Speak to your doctor            Additional Information About Your Visit        [employer] Information     [employer] gives you secure access to your electronic health record. If you see a primary care provider, you can also send messages to your care team and make appointments. If you have questions, please call your primary care clinic.  If you do not have a primary care provider, please call [phone] and they will assist you.      [employer] is an electronic gateway that provides easy, online access to your medical records. With [employer], you can request a clinic appointment, read your test results, renew a prescription or communicate with your care team.     To access your existing account, please contact your [facility] or call [phone] for assistance.        Care EveryWhere ID     This is your Care EveryWhere ID. This could be used by other organizations to access your  [place] medical records  [id number]        Your Vitals Were     Pulse Temperature Height Pulse Oximetry BMI (Body Mass Index)       63 98.1  F (36.7  C) (Oral) 1.829 m (6') 98% 36.23 kg/m2        Blood Pressure from Last 3 Encounters:   10/09/18 (!) 148/92   01/26/18 147/86   01/15/18 148/90    Weight from Last 3 Encounters:   10/09/18 121.2 kg (267 lb 1.6 oz)   08/08/18 115.2 kg (254 lb)   01/26/18 111.3 kg (245 lb 6.4 oz)              Today, you had the following     No orders found for display         Today's Medication Changes          These changes are accurate as of 10/9/18  9:19 AM.  If you have any questions, ask your nurse or doctor.               These medicines have changed or have updated prescriptions.        Dose/Directions    * mycophenolate 250 MG capsule   Commonly known as:  GENERIC EQUIVALENT   This may have changed:    - how much to take  - how to take this  - when to take this  - additional instructions   Used for:  Transplant        Dose:  250 mg   Take 1 capsule (250 mg) by mouth 2 times daily with 500 mg caps for a total of 750 mg twice daily   Quantity:  180 capsule   Refills:  3       * mycophenolate 500 MG tablet   Commonly known as:  GENERIC EQUIVALENT   This may have changed:    - how much to take  - additional instructions   Used for:  Transplant        Dose:  500 mg   Take 1 tablet (500 mg) by mouth 2 times daily with 250 mg caps for a total of 750 mg twice daily   Quantity:  180 tablet   Refills:  3       * Notice:  This list has 2 medication(s) that are the same as other medications prescribed for you. Read the directions carefully, and ask your doctor or other care provider to review them with you.             Primary Care Provider Office Phone # Fax #    Carrington Gil [phone]       [facility] [address] [place] [address]        Equal Access to Services     ROYCE GAMEZ AH: ira Ortega qaybta  nidhi lovebravo multani ah. [facility] [phone].    ATENCIÓN: Si surjit arias, tiene a membreno disposición servicios gratuitos de asistencia lingüística. Vito al [phone].    We comply with applicable federal civil rights laws and [place] laws. We do not discriminate on the basis of race, color, national origin, age, disability, sex, sexual orientation, or gender identity.            Thank you!     Thank you for choosing [facility]  for your care. Our goal is always to provide you with excellent care. Hearing back from our patients is one way we can continue to improve our services. Please take a few minutes to complete the written survey that you may receive in the mail after your visit with us. Thank you!             Your Updated Medication List - Protect others around you: Learn how to safely use, store and throw away your medicines at www.disposemymeds.org.          This list is accurate as of 10/9/18  9:19 AM.  Always use your most recent med list.                   Brand Name Dispense Instructions for use Diagnosis    amLODIPine 5 MG tablet    NORVASC     Take 5 mg by mouth daily        aspirin 81 MG chewable tablet     90 tablet    Take 1 tablet by mouth daily.    Liver transplanted (H)       CHOLECALCIFEROL PO      Take 1 tablet by mouth daily        cycloSPORINE modified 25 MG capsule    GENERIC EQUIVALENT    480 capsule    Take 4 capsules (100 mg) by mouth every 12 hours    Liver replaced by transplant (H)       gabapentin 100 MG capsule    NEURONTIN     Take 5 capsules (500 mg) by mouth in the morning, take 3 capsules (300 mg) by mouth at noon, take 3 capsules (300 mg) by mouth in the evening.        metoprolol tartrate 50 MG tablet    LOPRESSOR     Take 75 mg by mouth in the morning and take 50 mg by mouth in the evening.        mometasone 0.1 % ointment    ELOCON     Apply topically 2 times daily as needed        Multi-vitamin Tabs  tablet      Take 1 tablet by mouth daily        * mycophenolate 250 MG capsule    GENERIC EQUIVALENT    180 capsule    Take 1 capsule (250 mg) by mouth 2 times daily with 500 mg caps for a total of 750 mg twice daily    Transplant       * mycophenolate 500 MG tablet    GENERIC EQUIVALENT    180 tablet    Take 1 tablet (500 mg) by mouth 2 times daily with 250 mg caps for a total of 750 mg twice daily    Transplant       sildenafil 50 MG tablet    VIAGRA    12 tablet    Take 0.5-1 tablets (25-50 mg) by mouth daily as needed for erectile dysfunction Take 30 min to 4 hours before intercourse.  Never use with nitroglycerin, terazosin or doxazosin.    ED (erectile dysfunction)       tamsulosin 0.4 MG capsule    FLOMAX    30 capsule    Take 1 capsule (0.4 mg) by mouth daily    Nocturia       * Notice:  This list has 2 medication(s) that are the same as other medications prescribed for you. Read the directions carefully, and ask your doctor or other care provider to review them with you.

## 2018-10-09 NOTE — ANESTHESIA PREPROCEDURE EVALUATION
Anesthesia Evaluation     . Pt has had prior anesthetic. Type: General and MAC    No history of anesthetic complications          ROS/MED HX    ENT/Pulmonary: Comment: History of dental appliance for RAMONA. Not wearing at this time. Denies symptoms.    (+)sleep apnea, doesn't use CPAP , . .   (-) tobacco use   Neurologic:     (+)neuropathy - significant knee to toe,     Cardiovascular:     (+) hypertension-range: 120-140/80-90, ---. Taking blood thinners Pt has received instructions: Instructions Given to patient: On hold for surgery. . . :. . Previous cardiac testing date:results:Stress Testdate:2012 results:ECG reviewed date:10/9/18 results:SB with premature supraventricular complexes date: results:          METS/Exercise Tolerance:  3 - Able to walk 1-2 blocks without stopping   Hematologic:  - neg hematologic  ROS   (+) Anemia, History of Transfusion no previous transfusion reaction -      Musculoskeletal:   (+) , , other musculoskeletal- right hip AVN      GI/Hepatic:     (+) liver disease (Hx liver transplant.  good fxn.),       Renal/Genitourinary: Comment: Very good allograft function.    (+) chronic renal disease, type: ESRD and CRI, Pt does not require dialysis, Pt has history of transplant, date: 2013,       Endo:     (+) Obesity, .      Psychiatric:  - neg psychiatric ROS       Infectious Disease:  - neg infectious disease ROS       Malignancy:      - no malignancy   Other:    (+) No chance of pregnancy C-spine cleared: N/A, H/O Chronic Pain,no other significant disability                    Physical Exam  Normal systems: dental    Airway   Mallampati: I  TM distance: >3 FB  Neck ROM: limited    Dental     Cardiovascular   Rhythm and rate: regular and normal      Pulmonary    breath sounds clear to auscultation    Other findings: For further details of assessment, testing, and physical exam please see H and P completed on same date.           PAC Discussion and Assessment    ASA Classification: 3  Case  is suitable for: [place]  Anesthetic techniques and relevant risks discussed: GA and Regional  Invasive monitoring and risk discussed: No  Types:   Possibility and Risk of blood transfusion discussed: Yes  NPO instructions given:   Additional anesthetic preparation and risks discussed:   Needs early admission to pre-op area:   Other:     PAC Resident/NP Anesthesia Assessment:  Jarod Obregon is a 48 year old male scheduled to undergo right total hip arthroplasty with Dr. Scott on 10/30/18. He has the following specific operative considerations:   - RCRI : No serious cardiac risks.    - VTE risk: 0.5%  - RAMONA # of risks 4/8 = Intermediate risk  - Risk of PONV score = 2.  If > 2, anti-emetic intervention recommended.    Last GA for transplant 2013. No airway issues. Last two procedures have been with MAC. No history of problems with anesthesia.       --Avascular necrosis of right hip. Above procedure now planned. Spinal preferred by surgeon. Final counseling and decisions by Anesthesia on DOS.   --HTN. Will take Amlodipine and Metoprolol on DOS. No other cardiac history or symptoms. ASA 81 mg daily on hold for surgery. Testing above. Activity limited by pain.    --Nonsmoker. No pulmonary symptoms. History of RAMONA prior to transplant with dental appliance. Is not using at this time. Denies symptoms except for some mild snoring.    --History of renal and liver transplants in 2013, stable on immunosuppression. Will take Cyclosporine and mycophenolate on DOS. Cr 1.30, GFR 59   --Significant neuropathy from knees down bilaterally. Will take Gabapentin on DOS.    --History of blood transfusion. Type and screen to be drawn with patient's scheduled labs on 10/15/18.              --Enhanced recovery pathway initiated.    Patient was discussed with Dr Gallardo.    Reviewed and Signed by PAC Mid-Level Provider/Resident  Mid-Level Provider/Resident: KORY Brewster, CNSq  Date: 10/9/18  Time: 9:30am    Attending  Anesthesiologist Anesthesia Assessment:        Anesthesiologist:   Date:   Time:   Pass/Fail:   Disposition:     PAC Pharmacist Assessment:        Pharmacist:   Date:   Time:                           .

## 2018-10-09 NOTE — PATIENT INSTRUCTIONS
Preparing for Your Surgery      Name:  Jarod Obregon   MRN:  [id number]   :  1970   Today's Date:  10/9/2018     Arriving for surgery:  Surgery date:  10/30/18  Arrival time:  6:00AM  Please come to:     [facility] [address]  [address] [place]  [address]    - parking is available in front of [facility] from 5:15AM to 8:00PM. If you prefer, park your car in the Green Lot.    -Proceed to the 3rd floor, check in at the Adult Surgery Waiting Lounge. [phone]    If an escort is needed stop at the Information Desk in the lobby. Inform the information person that you are here for surgery. An escort to the Adult Surgery Waiting Lounge will be provided.        What can I eat or drink?  -  You may have solid food or milk products until 8 hours prior to your surgery. Midnight  -  You may have water, apple juice or 7up/Sprite until 2 hours prior to your surgery. 10/30/18, 6:00AM    Which medicines can I take?  Stop Aspirin, Multivitamins one week prior to surgery.  -  Do NOT take these medications in the morning, the day of surgery:     Vitamin D    -  Please take these medications the day of surgery:    Amlodipine  Cyclosporine   Gabapentin  Metoprolol   Mycophenolate Tamsulosin (Flomax)    How do I prepare myself?  -  Take two showers: one the night before surgery; and one the morning of surgery.         Use Scrubcare or Hibiclens to wash from neck down.  You may use your own shampoo and conditioner. No other hair products.   -  Do NOT use lotion, powder, deodorant, or antiperspirant the day of your surgery.  -  Do NOT wear jewelry.  -  Begin using Incentive Spirometer 1 week prior to surgery.  Use 4 times per day, up to 5 breaths each time.  Bring Incentive Spirometer to hospital.  - Do not bring your own medications to the hospital, except for inhalers and eye drops.  -  Bring your ID and insurance card.    Questions or Concerns:  -If you  have questions or concerns regarding the day of surgery, please call [phone].     -For questions after surgery please call your surgeons office.         Enhanced Recovery After Surgery     This is a team effort, including you, to get you back on your feet, eating and drinking normally and out of the hospital as quickly as possible.  The goals are: 1) NO INFECTIONS and   2) RETURN TO NORMAL DIET    How can we achieve these goals?  1) STAY ACTIVE: Walk every day before your surgery; try to increase the amount every day.  Walk after surgery as much as you can-the nurses will help you.  Walking speeds healing and gets you home quicker, you heal better at home and have less risk of infection.     2) INCENTIVE SPIROMETER: Practice your incentive spirometer 4 times per day with 5 repetitions each time.  Using the incentive spirometer can strengthen your muscles between your ribs and help you have a strong cough after surgery.  A more effective cough can help prevent problems with your lungs.    3) STAY HYDRATED: Drink clear liquids up until 2 hours before your surgery. We would like you to purchase a drink such as Gatorade or Ensure Clear (not the milkshake type).  Drink this before bedtime and on the way into the hospital, drink between 8-12 ounces or until you feel hydrated.  Keeping well hydrated leads to your veins being plump, you wake up faster, and you are less likely to be nauseated. Start drinking water as soon as you can after surgery and advance to clear liquids and food as tolerated.  IV fluids contain salt, drinking fluids will minimize the amount of IV fluids you need and decrease the amount of salt you get.    The most common reason for the patient to be readmitted is dehydration. Staying hydrated after you go home from the hospital is very important.  Ensure or Ensure Clear are good options to keep you hydrated.     4) PAIN MANAGEMENT: If we minimize the amount of opioids and narcotics, and use  regional blocks (which numb the area where your surgery is) along with oral pain medications; you will have less side effects of nausea and constipation. Narcotics can slow down your bowels and cause you to stay in the hospital longer.     Our goal is to keep you comfortable; eating and drinking normally and back home safely.     AFTER YOUR SURGERY  Breathing exercises   Breathing exercises help you recover faster. Take deep breaths and let the air out slowly. This will:     Help you wake up after surgery.    Help prevent complications like pneumonia.  Preventing complications will help you go home sooner.   We may give you a breathing device (incentive spirometer) to encourage you to breathe deeply.   Nausea and vomiting   You may feel sick to your stomach after surgery; if so, let your nurse know.    Pain control:  After surgery, you may have pain. Our goal is to help you manage your pain. Pain medicine will help you feel comfortable enough to do activities that will help you heal.  These activities may include breathing exercises, walking and physical therapy.   To help your health care team treat your pain we will ask: 1) If you have pain  2) where it is located 3) describe your pain in your words  Methods of pain control include medications given by mouth, vein or by nerve block for some surgeries.  Sequential Compression Device (SCD) or Pneumo Boots:  You may need to wear SCD S on your legs or feet. These are wraps connected to a machine that pumps in air and releases it. The repeated pumping helps prevent blood clots from forming.     Using an Incentive Spirometer    An incentive spirometer is a device that helps you do deep breathing exercises. These exercises expand your lungs, aid in circulation, and help prevent pneumonia. Deep breathing exercises also help you breathe better and improve the function of your lungs by:    Keeping your lungs clear    Strengthening your breathing muscles    Helping prevent  respiratory complications or problems  The incentive spirometer gives you a way to take an active part in your care. A nurse or therapist will teach you breathing exercises. To do these exercises, you will breathe in through your mouth and not your nose. The incentive spirometer only works correctly if you breathe in through your mouth.    Steps to clear lungs  Step 1. Exhale normally. Then, inhale normally.    Relax and breathe out.  Step 2. Place your lips tightly around the mouthpiece.    Make sure the device is upright and not tilted.  Step 3. Inhale as much air as you can through the mouthpiece (don't breath through your nose).    Inhale slowly and deeply.    Hold your breath long enough to keep the balls or disk raised for at least 3 to 5 seconds, or as instructed by your healthcare provider.  Step 4. Repeat the exercise regularly.    Begin using the Incentive Spirometer one week prior to your surgery, 4 times per day-5 times each.

## 2018-10-09 NOTE — MR AVS SNAPSHOT
After Visit Summary   10/9/2018    Jarod Obregon    MRN: [id number]           Patient Information     Date Of Birth          1970        Visit Information        Provider Department      10/9/2018 8:30 AM Pharmacist, Mireya Yang [facility] [place]        Today's Diagnoses     Preop examination    -  1       Follow-ups after your visit        Your next 10 appointments already scheduled     Oct 09, 2018 10:00 AM CDT   (Arrive by 9:45 AM)   PAC RN ASSESSMENT with Mireya Pac Rn   [facility] ([facility])    [address] [place] [id number]   [phone]            Oct 09, 2018 10:30 AM CDT   (Arrive by 10:15 AM)   PAC Anesthesia Consult with Mireya Pac Anesthesiologist   [facility] [place] ([facility])    [address] [place] [phone]   [phone]            Oct 15, 2018  7:30 AM CDT   LAB with  LAB   [facility] Lab ([facility])    [address] [place] [id number]   [phone]           Please do not eat 10-12 hours before your appointment if you are coming in fasting for labs on lipids, cholesterol, or glucose (sugar). This does not apply to pregnant women. Water, hot tea and black coffee (with nothing added) are okay. Do not drink other fluids, diet soda or chew gum.            Oct 22, 2018  7:30 AM CDT   LAB with  LAB   [facility] ([facility])    [address] [place] [id number] [phone]           Please do not eat 10-12 hours before your appointment if you are coming in fasting for labs on lipids, cholesterol, or glucose (sugar). This does not apply to pregnant women. Water, hot tea and black coffee (with nothing added) are okay. Do not drink other fluids, diet soda or chew gum.            Oct 29, 2018  7:30 AM CDT    LAB with  LAB   [facility] ([facility])    [address] [place] [phone]           Please do not eat 10-12 hours before your appointment if you are coming in fasting for labs on lipids, cholesterol, or glucose (sugar). This does not apply to pregnant women. Water, hot tea and black coffee (with nothing added) are okay. Do not drink other fluids, diet soda or chew gum.            Oct 30, 2018   Procedure with Nima Scott MD   [facility] [place], Same Day Surgery (--)    [address]  [place] [address]   [phone]            Nov 16, 2018 11:00 AM CST   (Arrive by 10:45 AM)   Post-Op with Nima Metzger PA-C   [facility] ([facility])    [address] [place] [address]  [address] [facility] [id number] [phone]            Dec 12, 2018 11:30 AM CST   (Arrive by 11:15 AM)   RETURN HIP with Nima Scott MD   [facility] ([facility])    [address] [place] [address]  [address] [facility] [id number] [phone]            Jan 07, 2019  8:00 AM CST   LAB with  LAB   [facility] ([facility])    [address] [place] [id number] [phone]           Please do not eat 10-12 hours before your appointment if you are coming in fasting for labs on lipids, cholesterol, or glucose (sugar). This does not apply to pregnant women. Water, hot tea and black coffee (with nothing added) are okay. Do not drink other fluids, diet soda or chew gum.            Jan 08, 2019 12:10 PM CST   (Arrive by 11:55 AM)   Return Liver Transplant with Jeannine Biggs MD   [facility] ([facility])    [address] [place] [address]  [address] [place] [id number] [phone]              Who to contact     Please call your clinic at [phone] to:    Ask questions about your  health    Make or cancel appointments    Discuss your medicines    Learn about your test results    Speak to your doctor            Additional Information About Your Visit        [employer] Information     [employer] gives you secure access to your electronic health record. If you see a primary care provider, you can also send messages to your care team and make appointments. If you have questions, please call your primary care clinic.  If you do not have a primary care provider, please call [phone] and they will assist you.      [employer] is an electronic gateway that provides easy, online access to your medical records. With [employer], you can request a clinic appointment, read your test results, renew a prescription or communicate with your care team.     To access your existing account, please contact your [facility] or call [phone] for assistance.        Care EveryWhere ID     This is your Care EveryWhere ID. This could be used by other organizations to access your [place] medical records  [id number]         Blood Pressure from Last 3 Encounters:   10/09/18 (!) 148/92   01/26/18 147/86   01/15/18 148/90    Weight from Last 3 Encounters:   10/09/18 121.2 kg (267 lb 1.6 oz)   08/08/18 115.2 kg (254 lb)   01/26/18 111.3 kg (245 lb 6.4 oz)              Today, you had the following     No orders found for display         Today's Medication Changes          These changes are accurate as of 10/9/18  9:02 AM.  If you have any questions, ask your nurse or doctor.               These medicines have changed or have updated prescriptions.        Dose/Directions    * mycophenolate 250 MG capsule   Commonly known as:  GENERIC EQUIVALENT   This may have changed:    - how much to take  - how to take this  - when to take this  - additional instructions   Used for:  Transplant        Dose:  250 mg   Take 1 capsule (250 mg) by mouth 2 times daily with 500 mg caps for a total of 750 mg twice daily    Quantity:  180 capsule   Refills:  3       * mycophenolate 500 MG tablet   Commonly known as:  GENERIC EQUIVALENT   This may have changed:    - how much to take  - additional instructions   Used for:  Transplant        Dose:  500 mg   Take 1 tablet (500 mg) by mouth 2 times daily with 250 mg caps for a total of 750 mg twice daily   Quantity:  180 tablet   Refills:  3       * Notice:  This list has 2 medication(s) that are the same as other medications prescribed for you. Read the directions carefully, and ask your doctor or other care provider to review them with you.             Primary Care Provider Office Phone # Fax #    Carrington Gil [phone]       [address]  [place] [address]        Equal Access to Services     ROYCE GAMEZ : Hadii maria del carmen Jane, waswapna jin, qaybta kaalmada jill, nidhi multani . [facility] [phone].    ATENCIÓN: Si habla español, tiene a membreno disposición servicios gratuitos de asistencia lingüística. [facility] [phone].    We comply with applicable federal civil rights laws and [place] laws. We do not discriminate on the basis of race, color, national origin, age, disability, sex, sexual orientation, or gender identity.            Thank you!     Thank you for choosing [facility]  for your care. Our goal is always to provide you with excellent care. Hearing back from our patients is one way we can continue to improve our services. Please take a few minutes to complete the written survey that you may receive in the mail after your visit with us. Thank you!             Your Updated Medication List - Protect others around you: Learn how to safely use, store and throw away your medicines at www.disposemymeds.org.          This list is accurate as of 10/9/18  9:02 AM.  Always use your most recent med list.                   Brand Name Dispense Instructions  for use Diagnosis    amLODIPine 5 MG tablet    NORVASC     Take 5 mg by mouth daily        aspirin 81 MG chewable tablet     90 tablet    Take 1 tablet by mouth daily.    Liver transplanted (H)       CHOLECALCIFEROL PO      Take 1 tablet by mouth daily        cycloSPORINE modified 25 MG capsule    GENERIC EQUIVALENT    480 capsule    Take 4 capsules (100 mg) by mouth every 12 hours    Liver replaced by transplant (H)       gabapentin 100 MG capsule    NEURONTIN     Take 5 capsules (500 mg) by mouth in the morning, take 3 capsules (300 mg) by mouth at noon, take 3 capsules (300 mg) by mouth in the evening.        metoprolol tartrate 50 MG tablet    LOPRESSOR     Take 75 mg by mouth in the morning and take 50 mg by mouth in the evening.        mometasone 0.1 % ointment    ELOCON     Apply topically 2 times daily as needed        Multi-vitamin Tabs tablet      Take 1 tablet by mouth daily        * mycophenolate 250 MG capsule    GENERIC EQUIVALENT    180 capsule    Take 1 capsule (250 mg) by mouth 2 times daily with 500 mg caps for a total of 750 mg twice daily    Transplant       * mycophenolate 500 MG tablet    GENERIC EQUIVALENT    180 tablet    Take 1 tablet (500 mg) by mouth 2 times daily with 250 mg caps for a total of 750 mg twice daily    Transplant       sildenafil 50 MG tablet    VIAGRA    12 tablet    Take 0.5-1 tablets (25-50 mg) by mouth daily as needed for erectile dysfunction Take 30 min to 4 hours before intercourse.  Never use with nitroglycerin, terazosin or doxazosin.    ED (erectile dysfunction)       tamsulosin 0.4 MG capsule    FLOMAX    30 capsule    Take 1 capsule (0.4 mg) by mouth daily    Nocturia       * Notice:  This list has 2 medication(s) that are the same as other medications prescribed for you. Read the directions carefully, and ask your doctor or other care provider to review them with you.

## 2018-10-09 NOTE — PROGRESS NOTES
FMLA forms for the patient's wife were dropped off at the clinic. These were faxed to Dr. Tyler Huang's  to be filled out, 10/9 10:00 am

## 2018-10-09 NOTE — PROGRESS NOTES
Preoperative Assessment Center medication history for October 9, 2018 is complete.    See Epic admission navigator for prior to admission medications.   Operating room staff will still need to confirm medications and last dose information on day of surgery.     Medication history interview sources:  patient    Changes made to PTA medication list (reason)  Added: gabapentin, cholecalciferol, multivitamin, mometasone ointment PRN.   Deleted: sirolimus - patient has titrated off of this medication in preparation for the upcoming surgery. He will eventually (likely 4+ weeks after surgery) go back on this but will be on cyclosporine in the interim.    Changed: mycophenolate dosing updated - currently taking 1000 mg BID (using 500 mg tablets).     Additional medication history information (including reliability of information, actions taken by pharmacist):    -- No recent (within 30 days) course of antibiotics  -- No recent (within 30 days) course of steroids  -- No recent (within 30 days) chronic daily medications stopped   -- Patient declines being on any other prescription or over-the-counter medications       Prior to Admission medications    Medication Sig Last Dose Taking? Auth Provider   amLODIPine (NORVASC) 5 MG tablet Take 5 mg by mouth daily Taking Yes Reported, Patient   CHOLECALCIFEROL PO Take 1 tablet by mouth daily Taking Yes Unknown, Entered By History   cycloSPORINE modified (GENERIC EQUIVALENT) 25 MG capsule Take 4 capsules (100 mg) by mouth every 12 hours Taking Yes Jeannine Biggs MD   gabapentin (NEURONTIN) 100 MG capsule Take 5 capsules (500 mg) by mouth in the morning, take 3 capsules (300 mg) by mouth at noon, take 3 capsules (300 mg) by mouth in the evening. Taking Yes Unknown, Entered By History   metoprolol (LOPRESSOR) 50 MG tablet Take 75 mg by mouth in the morning and take 50 mg by mouth in the evening. Taking Yes Reported, Patient   mometasone (ELOCON) 0.1 % ointment Apply  topically 2 times daily as needed Taking Yes Unknown, Entered By History   multivitamin, therapeutic with minerals (MULTI-VITAMIN) TABS tablet Take 1 tablet by mouth daily Taking Yes Unknown, Entered By History   mycophenolate (GENERIC EQUIVALENT) 500 MG tablet Take 1 tablet (500 mg) by mouth 2 times daily with 250 mg caps for a total of 750 mg twice daily  Patient taking differently: Take 1,000 mg by mouth 2 times daily  Taking Yes Jeannine Biggs MD   sildenafil (VIAGRA) 50 MG tablet Take 0.5-1 tablets (25-50 mg) by mouth daily as needed for erectile dysfunction Take 30 min to 4 hours before intercourse.  Never use with nitroglycerin, terazosin or doxazosin. Taking Yes Jeannine Biggs MD   aspirin 81 MG chewable tablet Take 1 tablet by mouth daily.  Patient not taking: Reported on 10/9/2018 Not Taking  Basilia Caicedo APRN CNP   mycophenolate (GENERIC EQUIVALENT) 250 MG capsule Take 1 capsule (250 mg) by mouth 2 times daily with 500 mg caps for a total of 750 mg twice daily  Patient taking differently: Not currently taking these - using the 500 mg tablets - see other order not taking - see other order  Jeannine Biggs MD   tamsulosin (FLOMAX) 0.4 MG capsule Take 1 capsule (0.4 mg) by mouth daily  Patient not taking: Reported on 10/9/2018 Not Taking  Jalen Emerson MD          Medication history completed by: Ambrosio Alarcon [facility]

## 2018-10-09 NOTE — TELEPHONE ENCOUNTER
Spoke to Jarod.  He made the IS change on 10/1.  He is taking  mg po bid and cellcept 1000 mg po bid.  His MPA level was 0.97, he had another level yesterday that's not back yet.  Per Dr. Emerson, level should be 1-3.  Will await yesterday's level.

## 2018-10-15 DIAGNOSIS — Z94.4 LIVER REPLACED BY TRANSPLANT (H): ICD-10-CM

## 2018-10-15 DIAGNOSIS — M87.00 AVASCULAR NECROSIS (H): ICD-10-CM

## 2018-10-15 LAB
CYCLOSPORINE BLD LC/MS/MS-MCNC: 120 UG/L (ref 50–400)
INTERPRETATION ECG - MUSE: NORMAL
TME LAST DOSE: NORMAL H

## 2018-10-16 ENCOUNTER — HOSPITAL ENCOUNTER (OUTPATIENT)
Dept: EDUCATION SERVICES | Facility: CLINIC | Age: 48
End: 2018-10-16
Payer: COMMERCIAL

## 2018-10-16 ENCOUNTER — TELEPHONE (OUTPATIENT)
Dept: TRANSPLANT | Facility: CLINIC | Age: 48
End: 2018-10-16

## 2018-10-16 DIAGNOSIS — Z94.4 LIVER REPLACED BY TRANSPLANT (H): ICD-10-CM

## 2018-10-16 LAB
MYCOPHENOLATE SERPL LC/MS/MS-MCNC: 1.1 MG/L (ref 1–3.5)
MYCOPHENOLATE-G SERPL LC/MS/MS-MCNC: 76.6 MG/L (ref 30–95)
TME LAST DOSE: NORMAL H

## 2018-10-16 RX ORDER — CYCLOSPORINE 25 MG/1
75 CAPSULE, LIQUID FILLED ORAL EVERY 12 HOURS
Qty: 540 CAPSULE | Refills: 5 | COMMUNITY
Start: 2018-10-16 | End: 2019-01-08

## 2018-10-16 NOTE — TELEPHONE ENCOUNTER
Cyclosporine level 120.    Spoke to Jarod, asked him to decrease cyclosporine to 75 mg in the morning and 75 mg in the evening.

## 2018-10-16 NOTE — PLAN OF CARE
10/16/18  Patient(pt)alone to [facility] Joint Replacement  Group class.Pt.attentive,able to answer basic teach back,entered into class discussion,and verbalized understanding of content presented.Continue to reinforce information.See also education flow sheet.  Written material given and reviewed in group class:PLC class packet and Joint Replacement book .

## 2018-10-26 ENCOUNTER — TELEPHONE (OUTPATIENT)
Dept: TRANSPLANT | Facility: CLINIC | Age: 48
End: 2018-10-26

## 2018-10-26 DIAGNOSIS — Z94.9 TRANSPLANT: ICD-10-CM

## 2018-10-26 RX ORDER — MYCOPHENOLATE MOFETIL 500 MG/1
1000 TABLET ORAL EVERY 12 HOURS
Qty: 360 TABLET | Refills: 3 | Status: SHIPPED | OUTPATIENT
Start: 2018-10-26 | End: 2019-01-08

## 2018-10-26 NOTE — TELEPHONE ENCOUNTER
Patient Call: Medication Refill  Route to LPN  Instruct the patient to first contact their pharmacy. If they have called their pharmacy and require further assistance, route to LPN.    Pharmacy Name: [facility]  Name of Medication: Mycophenolate 1000 mg   When will the patient be out of this medication?: Greater than 3 days (Route standard priority)

## 2018-10-26 NOTE — TELEPHONE ENCOUNTER
Jarod sent [employer] message for this so I responded to him there and sent refill request to his coordinator.

## 2018-10-29 ENCOUNTER — ANESTHESIA EVENT (OUTPATIENT)
Dept: SURGERY | Facility: CLINIC | Age: 48
End: 2018-10-29
Payer: COMMERCIAL

## 2018-10-29 DIAGNOSIS — Z94.4 LIVER REPLACED BY TRANSPLANT (H): ICD-10-CM

## 2018-10-29 LAB
ALBUMIN SERPL-MCNC: 3.6 G/DL (ref 3.4–5)
ALP SERPL-CCNC: 63 U/L (ref 40–150)
ALT SERPL W P-5'-P-CCNC: 36 U/L (ref 0–70)
ANION GAP SERPL CALCULATED.3IONS-SCNC: 7 MMOL/L (ref 3–14)
AST SERPL W P-5'-P-CCNC: 22 U/L (ref 0–45)
BILIRUB DIRECT SERPL-MCNC: 0.4 MG/DL (ref 0–0.2)
BILIRUB SERPL-MCNC: 1.5 MG/DL (ref 0.2–1.3)
BUN SERPL-MCNC: 21 MG/DL (ref 7–30)
CALCIUM SERPL-MCNC: 8.6 MG/DL (ref 8.5–10.1)
CHLORIDE SERPL-SCNC: 105 MMOL/L (ref 94–109)
CO2 SERPL-SCNC: 26 MMOL/L (ref 20–32)
CREAT SERPL-MCNC: 1.38 MG/DL (ref 0.66–1.25)
CYCLOSPORINE BLD LC/MS/MS-MCNC: 81 UG/L (ref 50–400)
ERYTHROCYTE [DISTWIDTH] IN BLOOD BY AUTOMATED COUNT: 13.5 % (ref 10–15)
GFR SERPL CREATININE-BSD FRML MDRD: 55 ML/MIN/1.7M2
GLUCOSE SERPL-MCNC: 87 MG/DL (ref 70–99)
HCT VFR BLD AUTO: 46.3 % (ref 40–53)
HGB BLD-MCNC: 14.9 G/DL (ref 13.3–17.7)
MCH RBC QN AUTO: 26.6 PG (ref 26.5–33)
MCHC RBC AUTO-ENTMCNC: 32.2 G/DL (ref 31.5–36.5)
MCV RBC AUTO: 83 FL (ref 78–100)
PLATELET # BLD AUTO: 203 10E9/L (ref 150–450)
POTASSIUM SERPL-SCNC: 4.3 MMOL/L (ref 3.4–5.3)
PROT SERPL-MCNC: 6.9 G/DL (ref 6.8–8.8)
RBC # BLD AUTO: 5.6 10E12/L (ref 4.4–5.9)
SODIUM SERPL-SCNC: 138 MMOL/L (ref 133–144)
TME LAST DOSE: NORMAL H
WBC # BLD AUTO: 5.5 10E9/L (ref 4–11)

## 2018-10-30 ENCOUNTER — ANESTHESIA (OUTPATIENT)
Dept: SURGERY | Facility: CLINIC | Age: 48
End: 2018-10-30
Payer: COMMERCIAL

## 2018-10-30 ENCOUNTER — HOSPITAL ENCOUNTER (INPATIENT)
Facility: CLINIC | Age: 48
LOS: 1 days | Discharge: HOME HEALTH | End: 2018-10-31
Attending: ORTHOPAEDIC SURGERY | Admitting: ORTHOPAEDIC SURGERY
Payer: COMMERCIAL

## 2018-10-30 ENCOUNTER — APPOINTMENT (OUTPATIENT)
Dept: PHYSICAL THERAPY | Facility: CLINIC | Age: 48
End: 2018-10-30
Attending: ORTHOPAEDIC SURGERY
Payer: COMMERCIAL

## 2018-10-30 ENCOUNTER — APPOINTMENT (OUTPATIENT)
Dept: GENERAL RADIOLOGY | Facility: CLINIC | Age: 48
End: 2018-10-30
Attending: ORTHOPAEDIC SURGERY
Payer: COMMERCIAL

## 2018-10-30 DIAGNOSIS — Z96.641 STATUS POST HIP REPLACEMENT, RIGHT: Primary | ICD-10-CM

## 2018-10-30 LAB
ABO + RH BLD: NORMAL
ABO + RH BLD: NORMAL
BLD GP AB SCN SERPL QL: NORMAL
BLOOD BANK CMNT PATIENT-IMP: NORMAL
BLOOD BANK CMNT PATIENT-IMP: NORMAL
GLUCOSE BLDC GLUCOMTR-MCNC: 82 MG/DL (ref 70–99)
SPECIMEN EXP DATE BLD: NORMAL

## 2018-10-30 PROCEDURE — 25000131 ZZH RX MED GY IP 250 OP 636 PS 637: Performed by: STUDENT IN AN ORGANIZED HEALTH CARE EDUCATION/TRAINING PROGRAM

## 2018-10-30 PROCEDURE — 40000171 ZZH STATISTIC PRE-PROCEDURE ASSESSMENT III: Performed by: ORTHOPAEDIC SURGERY

## 2018-10-30 PROCEDURE — 71000015 ZZH RECOVERY PHASE 1 LEVEL 2 EA ADDTL HR: Performed by: ORTHOPAEDIC SURGERY

## 2018-10-30 PROCEDURE — 97161 PT EVAL LOW COMPLEX 20 MIN: CPT | Mod: GP

## 2018-10-30 PROCEDURE — 37000009 ZZH ANESTHESIA TECHNICAL FEE, EACH ADDTL 15 MIN: Performed by: ORTHOPAEDIC SURGERY

## 2018-10-30 PROCEDURE — 40000985 XR PELVIS PORT 1/2 VW

## 2018-10-30 PROCEDURE — 97116 GAIT TRAINING THERAPY: CPT | Mod: GP

## 2018-10-30 PROCEDURE — 0SR904A REPLACEMENT OF RIGHT HIP JOINT WITH CERAMIC ON POLYETHYLENE SYNTHETIC SUBSTITUTE, UNCEMENTED, OPEN APPROACH: ICD-10-PCS | Performed by: ORTHOPAEDIC SURGERY

## 2018-10-30 PROCEDURE — 25000132 ZZH RX MED GY IP 250 OP 250 PS 637: Performed by: STUDENT IN AN ORGANIZED HEALTH CARE EDUCATION/TRAINING PROGRAM

## 2018-10-30 PROCEDURE — 25000132 ZZH RX MED GY IP 250 OP 250 PS 637: Performed by: ORTHOPAEDIC SURGERY

## 2018-10-30 PROCEDURE — 25000128 H RX IP 250 OP 636: Performed by: NURSE ANESTHETIST, CERTIFIED REGISTERED

## 2018-10-30 PROCEDURE — 25000566 ZZH SEVOFLURANE, EA 15 MIN: Performed by: ORTHOPAEDIC SURGERY

## 2018-10-30 PROCEDURE — 25000125 ZZHC RX 250: Performed by: NURSE ANESTHETIST, CERTIFIED REGISTERED

## 2018-10-30 PROCEDURE — 97530 THERAPEUTIC ACTIVITIES: CPT | Mod: GP

## 2018-10-30 PROCEDURE — 25800025 ZZH RX 258: Performed by: ORTHOPAEDIC SURGERY

## 2018-10-30 PROCEDURE — C1713 ANCHOR/SCREW BN/BN,TIS/BN: HCPCS | Performed by: ORTHOPAEDIC SURGERY

## 2018-10-30 PROCEDURE — 40000193 ZZH STATISTIC PT WARD VISIT

## 2018-10-30 PROCEDURE — 25000128 H RX IP 250 OP 636: Performed by: STUDENT IN AN ORGANIZED HEALTH CARE EDUCATION/TRAINING PROGRAM

## 2018-10-30 PROCEDURE — 37000008 ZZH ANESTHESIA TECHNICAL FEE, 1ST 30 MIN: Performed by: ORTHOPAEDIC SURGERY

## 2018-10-30 PROCEDURE — 25000128 H RX IP 250 OP 636: Performed by: ORTHOPAEDIC SURGERY

## 2018-10-30 PROCEDURE — 27210794 ZZH OR GENERAL SUPPLY STERILE: Performed by: ORTHOPAEDIC SURGERY

## 2018-10-30 PROCEDURE — C1776 JOINT DEVICE (IMPLANTABLE): HCPCS | Performed by: ORTHOPAEDIC SURGERY

## 2018-10-30 PROCEDURE — 40000986 XR PELVIS PORT 1/2 VW

## 2018-10-30 PROCEDURE — 71000014 ZZH RECOVERY PHASE 1 LEVEL 2 FIRST HR: Performed by: ORTHOPAEDIC SURGERY

## 2018-10-30 PROCEDURE — 36000066 ZZH SURGERY LEVEL 4 W FLUORO 1ST 30 MIN - UMMC: Performed by: ORTHOPAEDIC SURGERY

## 2018-10-30 PROCEDURE — 99231 SBSQ HOSP IP/OBS SF/LOW 25: CPT | Performed by: INTERNAL MEDICINE

## 2018-10-30 PROCEDURE — 36000064 ZZH SURGERY LEVEL 4 EA 15 ADDTL MIN - UMMC: Performed by: ORTHOPAEDIC SURGERY

## 2018-10-30 PROCEDURE — 25000128 H RX IP 250 OP 636: Performed by: ANESTHESIOLOGY

## 2018-10-30 PROCEDURE — 97110 THERAPEUTIC EXERCISES: CPT | Mod: GP

## 2018-10-30 PROCEDURE — 25000125 ZZHC RX 250: Performed by: ORTHOPAEDIC SURGERY

## 2018-10-30 PROCEDURE — 12000001 ZZH R&B MED SURG/OB UMMC

## 2018-10-30 PROCEDURE — 00000146 ZZHCL STATISTIC GLUCOSE BY METER IP

## 2018-10-30 PROCEDURE — 99207 ZZC MOONLIGHTING INDICATOR: CPT | Performed by: INTERNAL MEDICINE

## 2018-10-30 DEVICE — IMPLANTABLE DEVICE: Type: IMPLANTABLE DEVICE | Site: HIP | Status: FUNCTIONAL

## 2018-10-30 DEVICE — IMP SHELL BIOM G7 ACETAB PPS LIM HOLE 60MM SZ G 010000667: Type: IMPLANTABLE DEVICE | Site: HIP | Status: FUNCTIONAL

## 2018-10-30 DEVICE — IMP SCR ZIM 6.5X40MM ACET CUP SELF TAP 00-6250-065-40: Type: IMPLANTABLE DEVICE | Site: HIP | Status: FUNCTIONAL

## 2018-10-30 DEVICE — IMP LINER BIOM G7 ACET NEU ARCOMXL CRSLNK 36MM SZ G 10000742: Type: IMPLANTABLE DEVICE | Site: HIP | Status: FUNCTIONAL

## 2018-10-30 RX ORDER — CEFAZOLIN SODIUM 1 G/3ML
1 INJECTION, POWDER, FOR SOLUTION INTRAMUSCULAR; INTRAVENOUS SEE ADMIN INSTRUCTIONS
Status: DISCONTINUED | OUTPATIENT
Start: 2018-10-30 | End: 2018-10-30 | Stop reason: HOSPADM

## 2018-10-30 RX ORDER — PROPOFOL 10 MG/ML
INJECTION, EMULSION INTRAVENOUS PRN
Status: DISCONTINUED | OUTPATIENT
Start: 2018-10-30 | End: 2018-10-30

## 2018-10-30 RX ORDER — ONDANSETRON 2 MG/ML
4 INJECTION INTRAMUSCULAR; INTRAVENOUS EVERY 6 HOURS PRN
Status: DISCONTINUED | OUTPATIENT
Start: 2018-10-30 | End: 2018-10-31 | Stop reason: HOSPADM

## 2018-10-30 RX ORDER — AMOXICILLIN 250 MG
2 CAPSULE ORAL 2 TIMES DAILY
Status: DISCONTINUED | OUTPATIENT
Start: 2018-10-30 | End: 2018-10-31 | Stop reason: HOSPADM

## 2018-10-30 RX ORDER — CEFAZOLIN SODIUM IN 0.9 % NACL 3 G/100 ML
3 INTRAVENOUS SOLUTION, PIGGYBACK (ML) INTRAVENOUS
Status: COMPLETED | OUTPATIENT
Start: 2018-10-30 | End: 2018-10-30

## 2018-10-30 RX ORDER — CEFAZOLIN SODIUM 2 G/100ML
2 INJECTION, SOLUTION INTRAVENOUS EVERY 8 HOURS
Status: COMPLETED | OUTPATIENT
Start: 2018-10-30 | End: 2018-10-31

## 2018-10-30 RX ORDER — OXYCODONE HYDROCHLORIDE 5 MG/1
5-10 TABLET ORAL
Status: DISCONTINUED | OUTPATIENT
Start: 2018-10-30 | End: 2018-10-31

## 2018-10-30 RX ORDER — CELECOXIB 200 MG/1
200 CAPSULE ORAL ONCE
Status: COMPLETED | OUTPATIENT
Start: 2018-10-30 | End: 2018-10-30

## 2018-10-30 RX ORDER — SODIUM CHLORIDE, SODIUM LACTATE, POTASSIUM CHLORIDE, CALCIUM CHLORIDE 600; 310; 30; 20 MG/100ML; MG/100ML; MG/100ML; MG/100ML
INJECTION, SOLUTION INTRAVENOUS CONTINUOUS
Status: DISCONTINUED | OUTPATIENT
Start: 2018-10-30 | End: 2018-10-30 | Stop reason: HOSPADM

## 2018-10-30 RX ORDER — GABAPENTIN 300 MG/1
600 CAPSULE ORAL EVERY MORNING
Status: DISCONTINUED | OUTPATIENT
Start: 2018-10-31 | End: 2018-10-31 | Stop reason: HOSPADM

## 2018-10-30 RX ORDER — ACETAMINOPHEN 325 MG/1
975 TABLET ORAL ONCE
Status: COMPLETED | OUTPATIENT
Start: 2018-10-30 | End: 2018-10-30

## 2018-10-30 RX ORDER — CYCLOSPORINE 25 MG/1
75 CAPSULE, LIQUID FILLED ORAL EVERY 12 HOURS
Status: DISCONTINUED | OUTPATIENT
Start: 2018-10-30 | End: 2018-10-31 | Stop reason: HOSPADM

## 2018-10-30 RX ORDER — CEFAZOLIN SODIUM 2 G/100ML
2 INJECTION, SOLUTION INTRAVENOUS
Status: DISCONTINUED | OUTPATIENT
Start: 2018-10-30 | End: 2018-10-30 | Stop reason: DRUGHIGH

## 2018-10-30 RX ORDER — FENTANYL CITRATE 50 UG/ML
INJECTION, SOLUTION INTRAMUSCULAR; INTRAVENOUS PRN
Status: DISCONTINUED | OUTPATIENT
Start: 2018-10-30 | End: 2018-10-30

## 2018-10-30 RX ORDER — METOCLOPRAMIDE 10 MG/1
10 TABLET ORAL EVERY 6 HOURS PRN
Status: DISCONTINUED | OUTPATIENT
Start: 2018-10-30 | End: 2018-10-31 | Stop reason: HOSPADM

## 2018-10-30 RX ORDER — SODIUM CHLORIDE, SODIUM LACTATE, POTASSIUM CHLORIDE, CALCIUM CHLORIDE 600; 310; 30; 20 MG/100ML; MG/100ML; MG/100ML; MG/100ML
INJECTION, SOLUTION INTRAVENOUS CONTINUOUS
Status: DISCONTINUED | OUTPATIENT
Start: 2018-10-30 | End: 2018-10-31 | Stop reason: HOSPADM

## 2018-10-30 RX ORDER — MEPERIDINE HYDROCHLORIDE 25 MG/ML
12.5 INJECTION INTRAMUSCULAR; INTRAVENOUS; SUBCUTANEOUS EVERY 5 MIN PRN
Status: DISCONTINUED | OUTPATIENT
Start: 2018-10-30 | End: 2018-10-30 | Stop reason: HOSPADM

## 2018-10-30 RX ORDER — HYDROMORPHONE HYDROCHLORIDE 1 MG/ML
.3-.5 INJECTION, SOLUTION INTRAMUSCULAR; INTRAVENOUS; SUBCUTANEOUS EVERY 5 MIN PRN
Status: DISCONTINUED | OUTPATIENT
Start: 2018-10-30 | End: 2018-10-30 | Stop reason: HOSPADM

## 2018-10-30 RX ORDER — AMLODIPINE BESYLATE 5 MG/1
5 TABLET ORAL DAILY
Status: DISCONTINUED | OUTPATIENT
Start: 2018-10-31 | End: 2018-10-31 | Stop reason: HOSPADM

## 2018-10-30 RX ORDER — METOPROLOL TARTRATE 50 MG
50 TABLET ORAL EVERY EVENING
Status: DISCONTINUED | OUTPATIENT
Start: 2018-10-30 | End: 2018-10-31 | Stop reason: HOSPADM

## 2018-10-30 RX ORDER — AMOXICILLIN 250 MG
1 CAPSULE ORAL 2 TIMES DAILY
Status: DISCONTINUED | OUTPATIENT
Start: 2018-10-30 | End: 2018-10-31 | Stop reason: HOSPADM

## 2018-10-30 RX ORDER — METOCLOPRAMIDE HYDROCHLORIDE 5 MG/ML
10 INJECTION INTRAMUSCULAR; INTRAVENOUS EVERY 6 HOURS PRN
Status: DISCONTINUED | OUTPATIENT
Start: 2018-10-30 | End: 2018-10-31 | Stop reason: HOSPADM

## 2018-10-30 RX ORDER — FENTANYL CITRATE 50 UG/ML
25-50 INJECTION, SOLUTION INTRAMUSCULAR; INTRAVENOUS
Status: DISCONTINUED | OUTPATIENT
Start: 2018-10-30 | End: 2018-10-30 | Stop reason: HOSPADM

## 2018-10-30 RX ORDER — PROCHLORPERAZINE MALEATE 5 MG
10 TABLET ORAL EVERY 6 HOURS PRN
Status: DISCONTINUED | OUTPATIENT
Start: 2018-10-30 | End: 2018-10-31 | Stop reason: HOSPADM

## 2018-10-30 RX ORDER — ONDANSETRON 2 MG/ML
INJECTION INTRAMUSCULAR; INTRAVENOUS PRN
Status: DISCONTINUED | OUTPATIENT
Start: 2018-10-30 | End: 2018-10-30

## 2018-10-30 RX ORDER — ONDANSETRON 2 MG/ML
4 INJECTION INTRAMUSCULAR; INTRAVENOUS EVERY 30 MIN PRN
Status: DISCONTINUED | OUTPATIENT
Start: 2018-10-30 | End: 2018-10-30 | Stop reason: HOSPADM

## 2018-10-30 RX ORDER — LIDOCAINE 40 MG/G
CREAM TOPICAL
Status: DISCONTINUED | OUTPATIENT
Start: 2018-10-30 | End: 2018-10-31 | Stop reason: HOSPADM

## 2018-10-30 RX ORDER — NALOXONE HYDROCHLORIDE 0.4 MG/ML
.1-.4 INJECTION, SOLUTION INTRAMUSCULAR; INTRAVENOUS; SUBCUTANEOUS
Status: DISCONTINUED | OUTPATIENT
Start: 2018-10-30 | End: 2018-10-30

## 2018-10-30 RX ORDER — MYCOPHENOLATE MOFETIL 500 MG/1
1000 TABLET ORAL EVERY 12 HOURS
Status: DISCONTINUED | OUTPATIENT
Start: 2018-10-30 | End: 2018-10-31 | Stop reason: HOSPADM

## 2018-10-30 RX ORDER — HYDROMORPHONE HYDROCHLORIDE 1 MG/ML
.3-.5 INJECTION, SOLUTION INTRAMUSCULAR; INTRAVENOUS; SUBCUTANEOUS
Status: DISCONTINUED | OUTPATIENT
Start: 2018-10-30 | End: 2018-10-31 | Stop reason: HOSPADM

## 2018-10-30 RX ORDER — ASPIRIN 81 MG/1
81 TABLET, CHEWABLE ORAL 2 TIMES DAILY
Qty: 56 TABLET | Refills: 0 | Status: SHIPPED | OUTPATIENT
Start: 2018-10-31 | End: 2018-11-28

## 2018-10-30 RX ORDER — SODIUM CHLORIDE 9 MG/ML
INJECTION, SOLUTION INTRAVENOUS
Status: DISPENSED
Start: 2018-10-30 | End: 2018-10-31

## 2018-10-30 RX ORDER — AMOXICILLIN 250 MG
1 CAPSULE ORAL 2 TIMES DAILY
Qty: 30 TABLET | Refills: 0 | Status: SHIPPED | OUTPATIENT
Start: 2018-10-31 | End: 2020-01-07

## 2018-10-30 RX ORDER — ONDANSETRON 4 MG/1
4 TABLET, ORALLY DISINTEGRATING ORAL EVERY 30 MIN PRN
Status: DISCONTINUED | OUTPATIENT
Start: 2018-10-30 | End: 2018-10-30 | Stop reason: HOSPADM

## 2018-10-30 RX ORDER — ASPIRIN 81 MG/1
81 TABLET, CHEWABLE ORAL 2 TIMES DAILY
Status: DISCONTINUED | OUTPATIENT
Start: 2018-10-30 | End: 2018-10-31 | Stop reason: HOSPADM

## 2018-10-30 RX ORDER — ONDANSETRON 4 MG/1
4 TABLET, ORALLY DISINTEGRATING ORAL EVERY 6 HOURS PRN
Status: DISCONTINUED | OUTPATIENT
Start: 2018-10-30 | End: 2018-10-31 | Stop reason: HOSPADM

## 2018-10-30 RX ORDER — NALOXONE HYDROCHLORIDE 0.4 MG/ML
.1-.4 INJECTION, SOLUTION INTRAMUSCULAR; INTRAVENOUS; SUBCUTANEOUS
Status: DISCONTINUED | OUTPATIENT
Start: 2018-10-30 | End: 2018-10-31 | Stop reason: HOSPADM

## 2018-10-30 RX ORDER — SODIUM CHLORIDE, SODIUM LACTATE, POTASSIUM CHLORIDE, CALCIUM CHLORIDE 600; 310; 30; 20 MG/100ML; MG/100ML; MG/100ML; MG/100ML
INJECTION, SOLUTION INTRAVENOUS CONTINUOUS PRN
Status: DISCONTINUED | OUTPATIENT
Start: 2018-10-30 | End: 2018-10-30

## 2018-10-30 RX ORDER — HYDRALAZINE HYDROCHLORIDE 20 MG/ML
INJECTION INTRAMUSCULAR; INTRAVENOUS PRN
Status: DISCONTINUED | OUTPATIENT
Start: 2018-10-30 | End: 2018-10-30

## 2018-10-30 RX ORDER — LIDOCAINE HYDROCHLORIDE 20 MG/ML
INJECTION, SOLUTION INFILTRATION; PERINEURAL PRN
Status: DISCONTINUED | OUTPATIENT
Start: 2018-10-30 | End: 2018-10-30

## 2018-10-30 RX ORDER — LIDOCAINE 40 MG/G
CREAM TOPICAL
Status: DISCONTINUED | OUTPATIENT
Start: 2018-10-30 | End: 2018-10-30 | Stop reason: HOSPADM

## 2018-10-30 RX ORDER — GABAPENTIN 100 MG/1
300 CAPSULE ORAL
Status: DISCONTINUED | OUTPATIENT
Start: 2018-10-30 | End: 2018-10-30 | Stop reason: HOSPADM

## 2018-10-30 RX ADMIN — HYDROMORPHONE HYDROCHLORIDE 0.5 MG: 1 INJECTION, SOLUTION INTRAMUSCULAR; INTRAVENOUS; SUBCUTANEOUS at 08:46

## 2018-10-30 RX ADMIN — SENNOSIDES AND DOCUSATE SODIUM 2 TABLET: 8.6; 5 TABLET ORAL at 19:22

## 2018-10-30 RX ADMIN — PROCHLORPERAZINE EDISYLATE 10 MG: 5 INJECTION INTRAMUSCULAR; INTRAVENOUS at 18:03

## 2018-10-30 RX ADMIN — HYDRALAZINE HYDROCHLORIDE 5 MG: 20 INJECTION INTRAMUSCULAR; INTRAVENOUS at 09:40

## 2018-10-30 RX ADMIN — CEFAZOLIN SODIUM 2 G: 2 INJECTION, SOLUTION INTRAVENOUS at 16:45

## 2018-10-30 RX ADMIN — Medication 3 G: at 08:15

## 2018-10-30 RX ADMIN — HYDROMORPHONE HYDROCHLORIDE 0.5 MG: 1 INJECTION, SOLUTION INTRAMUSCULAR; INTRAVENOUS; SUBCUTANEOUS at 10:02

## 2018-10-30 RX ADMIN — FENTANYL CITRATE 50 MCG: 50 INJECTION, SOLUTION INTRAMUSCULAR; INTRAVENOUS at 10:11

## 2018-10-30 RX ADMIN — ROCURONIUM BROMIDE 80 MG: 10 INJECTION INTRAVENOUS at 08:12

## 2018-10-30 RX ADMIN — SODIUM CHLORIDE 1 G: 9 INJECTION, SOLUTION INTRAVENOUS at 08:12

## 2018-10-30 RX ADMIN — SODIUM CHLORIDE 1 G: 9 INJECTION, SOLUTION INTRAVENOUS at 09:39

## 2018-10-30 RX ADMIN — ACETAMINOPHEN 975 MG: 325 TABLET ORAL at 06:46

## 2018-10-30 RX ADMIN — ROCURONIUM BROMIDE 10 MG: 10 INJECTION INTRAVENOUS at 09:15

## 2018-10-30 RX ADMIN — Medication 1 G: at 10:10

## 2018-10-30 RX ADMIN — PROPOFOL 200 MG: 10 INJECTION, EMULSION INTRAVENOUS at 08:11

## 2018-10-30 RX ADMIN — LIDOCAINE HYDROCHLORIDE 100 MG: 20 INJECTION, SOLUTION INFILTRATION; PERINEURAL at 08:11

## 2018-10-30 RX ADMIN — FENTANYL CITRATE 100 MCG: 50 INJECTION, SOLUTION INTRAMUSCULAR; INTRAVENOUS at 08:11

## 2018-10-30 RX ADMIN — SODIUM CHLORIDE, POTASSIUM CHLORIDE, SODIUM LACTATE AND CALCIUM CHLORIDE: 600; 310; 30; 20 INJECTION, SOLUTION INTRAVENOUS at 19:47

## 2018-10-30 RX ADMIN — SUGAMMADEX 200 MG: 100 INJECTION, SOLUTION INTRAVENOUS at 09:57

## 2018-10-30 RX ADMIN — Medication 0.5 MG: at 19:19

## 2018-10-30 RX ADMIN — ONDANSETRON 4 MG: 2 INJECTION INTRAMUSCULAR; INTRAVENOUS at 09:46

## 2018-10-30 RX ADMIN — HYDROMORPHONE HYDROCHLORIDE 0.5 MG: 1 INJECTION, SOLUTION INTRAMUSCULAR; INTRAVENOUS; SUBCUTANEOUS at 11:00

## 2018-10-30 RX ADMIN — Medication 0.5 MG: at 13:52

## 2018-10-30 RX ADMIN — FENTANYL CITRATE 150 MCG: 50 INJECTION, SOLUTION INTRAMUSCULAR; INTRAVENOUS at 08:36

## 2018-10-30 RX ADMIN — MYCOPHENOLATE MOFETIL 1000 MG: 500 TABLET ORAL at 19:22

## 2018-10-30 RX ADMIN — FENTANYL CITRATE 50 MCG: 50 INJECTION, SOLUTION INTRAMUSCULAR; INTRAVENOUS at 10:16

## 2018-10-30 RX ADMIN — FENTANYL CITRATE 50 MCG: 50 INJECTION, SOLUTION INTRAMUSCULAR; INTRAVENOUS at 09:10

## 2018-10-30 RX ADMIN — ONDANSETRON 4 MG: 2 INJECTION INTRAMUSCULAR; INTRAVENOUS at 16:40

## 2018-10-30 RX ADMIN — CYCLOSPORINE 75 MG: 25 CAPSULE, LIQUID FILLED ORAL at 19:19

## 2018-10-30 RX ADMIN — GABAPENTIN 500 MG: 100 CAPSULE ORAL at 19:22

## 2018-10-30 RX ADMIN — Medication 0.5 MG: at 16:33

## 2018-10-30 RX ADMIN — CELECOXIB 200 MG: 200 CAPSULE ORAL at 06:47

## 2018-10-30 RX ADMIN — FENTANYL CITRATE 50 MCG: 50 INJECTION, SOLUTION INTRAMUSCULAR; INTRAVENOUS at 08:46

## 2018-10-30 RX ADMIN — METOPROLOL TARTRATE 50 MG: 50 TABLET, FILM COATED ORAL at 19:22

## 2018-10-30 RX ADMIN — SODIUM CHLORIDE, POTASSIUM CHLORIDE, SODIUM LACTATE AND CALCIUM CHLORIDE: 600; 310; 30; 20 INJECTION, SOLUTION INTRAVENOUS at 09:43

## 2018-10-30 RX ADMIN — ASPIRIN 81 MG CHEWABLE TABLET 81 MG: 81 TABLET CHEWABLE at 19:19

## 2018-10-30 RX ADMIN — FENTANYL CITRATE 50 MCG: 50 INJECTION, SOLUTION INTRAMUSCULAR; INTRAVENOUS at 10:00

## 2018-10-30 RX ADMIN — HYDROMORPHONE HYDROCHLORIDE 0.5 MG: 1 INJECTION, SOLUTION INTRAMUSCULAR; INTRAVENOUS; SUBCUTANEOUS at 10:20

## 2018-10-30 RX ADMIN — SODIUM CHLORIDE, POTASSIUM CHLORIDE, SODIUM LACTATE AND CALCIUM CHLORIDE: 600; 310; 30; 20 INJECTION, SOLUTION INTRAVENOUS at 08:00

## 2018-10-30 ASSESSMENT — ACTIVITIES OF DAILY LIVING (ADL)
ADLS_ACUITY_SCORE: 15
ADLS_ACUITY_SCORE: 17

## 2018-10-30 NOTE — ANESTHESIA PREPROCEDURE EVALUATION
)Anesthesia Pre-Procedure Evaluation    Patient: Jarod Obregon   MRN:     [id number] Gender:   male   Age:    48 year old :      1970        Preoperative Diagnosis: Osteoarthritis   Procedure(s):  Right Total Hip Arthroplasty       Past Medical History:   Diagnosis Date     Alcoholic cirrhosis of liver (H)      Anemia      Anemia in chronic kidney disease(285.21)      Anxiety      Ascites      Bleeding disorder (H)      Chronic infection     VRE+     Dialysis care      Dialysis patient (H)      Dizziness      Elevated blood pressure reading without diagnosis of hypertension      End stage renal disease (H)      ESRD (end stage renal disease) (H)      Generalized anxiety disorder      Generalized anxiety disorder      GI bleed      GI bleeding      Hepatic encephalopathy (H)      Hepatorenal syndrome (H)      High risk medications (not anticoagulants) long-term use      History of blood transfusion      Hydrocele      Hypertension      Hypogonadism male      Hypotension      Immunosuppressed status (H)      Insomnia with sleep apnea, unspecified      Kidney replaced by transplant      Liver failure (H)      Liver replaced by transplant (H)      Myopathy      Obesity      Other and unspecified alcohol dependence, unspecified drinking behavior      Renal failure      Secondary hyperparathyroidism (of renal origin)      Sleep apnea     mild in past, improved with weight loss     Thrombocytopenia (H)      Unspecified vitamin D deficiency      Past Surgical History:   Procedure Laterality Date     BENCH KIDNEY  2013    Procedure: BENCH KIDNEY;;  Surgeon: Claudy Hassan MD;  Location: [facility]     BENCH LIVER  2013    Procedure: BENCH LIVER;;  Surgeon: Claudy Hassan MD;  Location: [facility]     CHOLECYSTECTOMY       COLONOSCOPY       CYSTOSCOPY, REMOVE STENT(S), COMBINED  2013    Procedure: COMBINED CYSTOSCOPY, REMOVE STENT(S);  Flexible Cystoscopy, Removal Right Ureteral Stent;  Surgeon:  Claudy Hassan MD;  Location: [facility]     diaylysis shunt       ENDOSCOPY       ENT SURGERY      sinus surgery     HERNIA REPAIR      I believe this was done during tranplant surgery     LIGATE FISTULA ARTERIOVENOUS UPPER EXTREMITY  5/15/2013    Procedure: LIGATE FISTULA ARTERIOVENOUS UPPER EXTREMITY;  Right  Arm Arteriovenous Fistula Ligation;  Surgeon: Claudy Hassan MD;  Location: [facility]     PARACENTESIS       TRANSPLANT LIVER, KIDNEY RECIPIENT  DONOR, COMBINED  2013    Procedure: COMBINED TRANSPLANT LIVER, KIDNEY RECIPIENT  DONOR;  Liver Tranplant  Donor, Kidney Transplant  Donor to Right Iliac Fossa, Intraoperative Dialysis, Placement of Ureteral Stent into transplanted ureter;  Surgeon: Claudy Hassan MD;  Location: [facility]     wisdom teeth extraction         Anesthesia Evaluation     . Pt has had prior anesthetic. Type: General    No history of anesthetic complications          ROS/MED HX    ENT/Pulmonary:     (+)sleep apnea, doesn't use CPAP , . .    Neurologic:       Cardiovascular:     (+) hypertension----. : . . . :. .       METS/Exercise Tolerance:     Hematologic:     (+) Anemia, History of Transfusion Other Hematologic Disorder-      Musculoskeletal:         GI/Hepatic:     (+) liver disease, Other GI/Hepatic s/p Liver Tx      Renal/Genitourinary:     (+) chronic renal disease, type: ESRD, Pt does not require dialysis, Pt has history of transplant,       Endo:         Psychiatric:     (+) psychiatric history anxiety and depression      Infectious Disease:         Malignancy:         Other:                         PHYSICAL EXAM:   Mental Status/Neuro: A/A/O   Airway: Mallampati: I  Mouth/Opening: Full  TM distance: > 6 cm  Neck ROM: Full   Respiratory: Auscultation: CTAB     Resp. Rate: Normal     Resp. Effort: Normal      CV: Rhythm: Regular  Rate: Age appropriate  Heart: Normal Sounds   Comments:      Dental: Normal                Lab Results   Component Value  "Date    WBC 5.5 10/29/2018    HGB 14.9 10/29/2018    HCT 46.3 10/29/2018     10/29/2018    .7 (H) 01/13/2013     10/29/2018    POTASSIUM 4.3 10/29/2018    CHLORIDE 105 10/29/2018    CO2 26 10/29/2018    BUN 21 10/29/2018    CR 1.38 (H) 10/29/2018    GLC 87 10/29/2018    KIMBERLEY 8.6 10/29/2018    PHOS 2.4 (L) 07/24/2015    MAG 1.9 07/24/2015    ALBUMIN 3.6 10/29/2018    PROTTOTAL 6.9 10/29/2018    ALT 36 10/29/2018    AST 22 10/29/2018    ALKPHOS 63 10/29/2018    BILITOTAL 1.5 (H) 10/29/2018    LIPASE 28 01/15/2013    AMYLASE <30 (L) 01/15/2013    ASHLY 45 (H) 01/06/2013    PTT 28 01/19/2013    INR 1.17 (H) 02/03/2014    FIBR 338 01/19/2013    TSH 3.21 02/03/2014       Preop Vitals  BP Readings from Last 3 Encounters:   10/30/18 (!) 170/106   10/09/18 (!) 148/92   01/26/18 147/86    Pulse Readings from Last 3 Encounters:   10/30/18 58   10/09/18 63   01/26/18 63      Resp Readings from Last 3 Encounters:   10/30/18 16   01/15/18 20   07/25/14 18    SpO2 Readings from Last 3 Encounters:   10/30/18 97%   10/09/18 98%   01/26/18 100%      Temp Readings from Last 1 Encounters:   10/30/18 36.7  C (98.1  F) (Oral)    Ht Readings from Last 1 Encounters:   10/30/18 1.829 m (6' 0.01\")      Wt Readings from Last 1 Encounters:   10/30/18 121.8 kg (268 lb 8.3 oz)    Estimated body mass index is 36.41 kg/(m^2) as calculated from the following:    Height as of this encounter: 1.829 m (6' 0.01\").    Weight as of this encounter: 121.8 kg (268 lb 8.3 oz).     LDA:       Assessment:   ASA SCORE: 3    NPO Status: > 6 hours since completed [employer]   Documentation: H&P complete; Preop Testing complete; Consents complete   Proceeding: Proceed without further delay  Tobacco Use:  NO Active use of Tobacco/UNKNOWN Tobacco use status     Plan:   Anes. Type:  General   Pre-Induction: Midazolam IV; Acetaminophen PO   Induction:  IV (Standard); Propofol   Airway: Oral ETT; CMAC/VL   Access/Monitoring: PIV; 2nd PIV "   Maintenance: Balanced   Emergence: Procedure Site   Logistics: Same Day Surgery     Postop Pain/Sedation Strategy:  Standard-Options: Opioids PRN     PONV Management:  Adult Risk Factors:, Non-Smoker, Postop Opioids                         José Miguel Virk MD, MD

## 2018-10-30 NOTE — CONSULTS
HOSPITALIST CONSULTATION     REQUESTING PHYSICIAN: Nima Scott MD    REASON FOR CONSULTATION: Evaluation, Recommendations and co management of Medical Comorbidities.     ASSESSMENT & PLAN :      Jarod Obregon is a 48 year old male with PMH including  hx of liver and kidney transplant on immunosuppression, HTN, RAMONA,   s/p R ROBERTA on 10/30 with Dr. Scott.      Anesthesia:               General                       Estimated blood loss:            200 ml  No complications.      Past medical history, preop evaluation reviewed.  At present patient doing well.  Pain controlled.  Denies any acute or new medical concern.      # HTN.  Continue amlodipine and Metoprolol with holding parameters.  No other cardiac history or symptoms.  Resume aspirin when safe from surgical standpoint.  Monitor blood pressure  # History of RAMONA prior to transplant with dental appliance. Is not using at this time. Denies symptoms except for some mild snoring.  Monitor  # History of renal and liver transplants in 2013, stable on immunosuppression.   -Continue home Cyclosporine and mycophenolate , Pre OP:  Cr 1.30. GFR 59  -Notify transplant team in the morning.    # Significant neuropathy from knees down bilaterally.  Continue home gabapentin.    # Orthopedics: POD # 0  Hemodynamics: stable.   continue on IV fluids, until adequate PO.    Monitor hgb for anemia of acute blood loss. Transfuse for hgb <7.0    Analgesia: Per Orthopedic team.   DVT prophylaxis:- Per orthopedic team  Activity per orthopedic team.   Antibiotics and wound care as per primary team.   Encourage Incentive spirometry to prevent atelectasis   Minimize use of narcotics as able. Consider bowel regimen with narcotics.       Thank you for letting us get involved in care of Mr Olivera  Please page with any questions.      Isaiah Perez MD  House Physician  Pager: [phone]    10/30/2018        CHIEF COMPLAINT: Doing well    HISTORY  OF PRESENT ILLNESS: Obtained from the patient and chart review including Pre op evaluation, procedure note.    48 year old year old male  with above discussed medical problems s/p above orthopedic procedure admitted on 10/30/2018  for post op care and monitoring  (for further details for indication of surgery and operative note, please refer to Nima Scott MD note). Medicine consulted to evaluate, recommend and/or co manage medical co morbidities.   No documented hypotension, hypoxemia or other significant complications intra or post operative.   Currently: Incisional Pain controlled. Denies any chest pain, shortness of breath or LH or palpitations. Denies any nausea, vomiting or pain abdomen. No fever or chills. Denies any dysuria.  Denies any new rash.   Medical issues as discussed above.   Denies any other medical concern.     PAST MEDICAL HISTORY:   Past Medical History:   Diagnosis Date     Alcoholic cirrhosis of liver (H)      Anemia      Anemia in chronic kidney disease(285.21)      Anxiety      Ascites      Bleeding disorder (H)      Chronic infection     VRE+     Dialysis care      Dialysis patient (H)      Dizziness      Elevated blood pressure reading without diagnosis of hypertension      End stage renal disease (H)      ESRD (end stage renal disease) (H)      Generalized anxiety disorder      Generalized anxiety disorder      GI bleed      GI bleeding      Hepatic encephalopathy (H)      Hepatorenal syndrome (H)      High risk medications (not anticoagulants) long-term use      History of blood transfusion      Hydrocele      Hypertension      Hypogonadism male      Hypotension      Immunosuppressed status (H)      Insomnia with sleep apnea, unspecified      Kidney replaced by transplant      Liver failure (H)      Liver replaced by transplant (H)      Myopathy      Obesity      Other and unspecified alcohol dependence, unspecified drinking behavior      Renal failure      Secondary  hyperparathyroidism (of renal origin)      Sleep apnea     mild in past, improved with weight loss     Thrombocytopenia (H)      Unspecified vitamin D deficiency        PAST SURGICAL HISTORY:   Past Surgical History:   Procedure Laterality Date     BENCH KIDNEY  2013    Procedure: BENCH KIDNEY;;  Surgeon: Claudy Hassan MD;  Location: [facility]     BENCH LIVER  2013    Procedure: BENCH LIVER;;  Surgeon: Claudy Hassan MD;  Location[facility]     CHOLECYSTECTOMY       COLONOSCOPY       CYSTOSCOPY, REMOVE STENT(S), COMBINED  2013    Procedure: COMBINED CYSTOSCOPY, REMOVE STENT(S);  Flexible Cystoscopy, Removal Right Ureteral Stent;  Surgeon: Claudy Hassan MD;  Location: [facility]     diaylysis shunt       ENDOSCOPY       ENT SURGERY      sinus surgery     HERNIA REPAIR      I believe this was done during tranplant surgery     LIGATE FISTULA ARTERIOVENOUS UPPER EXTREMITY  5/15/2013    Procedure: LIGATE FISTULA ARTERIOVENOUS UPPER EXTREMITY;  Right  Arm Arteriovenous Fistula Ligation;  Surgeon: Claudy Hassan MD;  Location: [facility]     PARACENTESIS       TRANSPLANT LIVER, KIDNEY RECIPIENT  DONOR, COMBINED  2013    Procedure: COMBINED TRANSPLANT LIVER, KIDNEY RECIPIENT  DONOR;  Liver Tranplant  Donor, Kidney Transplant  Donor to Right Iliac Fossa, Intraoperative Dialysis, Placement of Ureteral Stent into transplanted ureter;  Surgeon: Claudy Hassan MD;  Location: [facility]     wisdom teeth extraction         FH: reviewed.     Family History   Problem Relation Age of Onset     HEART DISEASE Father      Heart Attack, Stent placed     Hypertension Father      Allergies Son      Thyroid Disease Sister      HEART DISEASE Paternal Grandfather      Hypertension Paternal Grandfather         SH: reviewed.     Social History     Social History     Marital status:      Spouse name: N/A     Number of children: N/A     Years of education: N/A     Social History Main Topics      Smoking status: Never Smoker     Smokeless tobacco: Never Used     Alcohol use No      Comment: Occasional, rare     Drug use: No     Sexual activity: Yes     Partners: Female     Other Topics Concern     None     Social History Narrative       ALLERGIES:   Allergies   Allergen Reactions     Paxil [Paroxetine] Other (See Comments)     Caused Severe Tremor     Cepacol Maximum Strength Rash     Chlorhexidine Rash     Phenol Rash         HOME MEDICATIONS:     Prior to Admission medications    Medication Sig Start Date End Date Taking? Authorizing Provider   amLODIPine (NORVASC) 5 MG tablet Take 5 mg by mouth daily   Yes Reported, Patient   aspirin 81 MG chewable tablet Take 1 tablet (81 mg) by mouth 2 times daily for 28 days 10/31/18 11/28/18 Yes Vale Gonzalez MD   CHOLECALCIFEROL PO Take 1 tablet by mouth daily   Yes Unknown, Entered By History   cycloSPORINE modified (GENERIC EQUIVALENT) 25 MG capsule Take 3 capsules (75 mg) by mouth every 12 hours 10/16/18  Yes Jeannine Biggs MD   gabapentin (NEURONTIN) 100 MG capsule Take 6 capsules (600 mg) by mouth in the morning, take 5 capsules (500 mg) by mouth in the evening.   Yes Unknown, Entered By History   metoprolol (LOPRESSOR) 50 MG tablet Take 75 mg by mouth in the morning and take 50 mg by mouth in the evening.   Yes Reported, Patient   multivitamin, therapeutic with minerals (MULTI-VITAMIN) TABS tablet Take 1 tablet by mouth daily   Yes Unknown, Entered By History   mycophenolate (GENERIC EQUIVALENT) 500 MG tablet Take 2 tablets (1,000 mg) by mouth every 12 hours 10/26/18  Yes Jeannine Biggs MD   senna-docusate (SENOKOT-S;PERICOLACE) 8.6-50 MG per tablet Take 1 tablet by mouth 2 times daily 10/31/18  Yes Vale Gonzalez MD   mometasone (ELOCON) 0.1 % ointment Apply topically 2 times daily as needed    Unknown, Entered By History   sildenafil (VIAGRA) 50 MG tablet Take 0.5-1 tablets (25-50 mg) by mouth daily as needed for  "erectile dysfunction Take 30 min to 4 hours before intercourse.  Never use with nitroglycerin, terazosin or doxazosin. 1/21/14   Jeannine Biggs MD   tamsulosin (FLOMAX) 0.4 MG capsule Take 1 capsule (0.4 mg) by mouth daily  Patient not taking: Reported on 10/9/2018 4/23/18   Jalen Emerson MD       CURRENT MEDICATIONS:    Current Facility-Administered Medications   Medication     aspirin chewable tablet 81 mg     ceFAZolin (ANCEF) intermittent infusion 2 g in 100 mL dextrose PRE-MIX     cycloSPORINE modified (GENERIC EQUIVALENT) capsule 75 mg     gabapentin (NEURONTIN) capsule 500 mg     [START ON 10/31/2018] gabapentin (NEURONTIN) capsule 600 mg     HYDROmorphone (PF) (DILAUDID) injection 0.3-0.5 mg     lactated ringers infusion     lidocaine (LMX4) cream     lidocaine 1 % 1 mL     [START ON 10/31/2018] melatonin tablet 1 mg     metoclopramide (REGLAN) tablet 10 mg    Or     metoclopramide (REGLAN) injection 10 mg     metoprolol tartrate (LOPRESSOR) tablet 50 mg     [START ON 10/31/2018] metoprolol tartrate (LOPRESSOR) tablet 75 mg     mycophenolate (GENERIC EQUIVALENT) tablet 1,000 mg     naloxone (NARCAN) injection 0.1-0.4 mg     ondansetron (ZOFRAN-ODT) ODT tab 4 mg    Or     ondansetron (ZOFRAN) injection 4 mg     oxyCODONE IR (ROXICODONE) tablet 5-10 mg     prochlorperazine (COMPAZINE) injection 10 mg    Or     prochlorperazine (COMPAZINE) tablet 10 mg     senna-docusate (SENOKOT-S;PERICOLACE) 8.6-50 MG per tablet 1 tablet    Or     senna-docusate (SENOKOT-S;PERICOLACE) 8.6-50 MG per tablet 2 tablet     sodium chloride (PF) 0.9% PF flush 3 mL     sodium chloride (PF) 0.9% PF flush 3 mL     sodium chloride 0.9 % infusion     sodium chloride 0.9 % infusion         ROS: 10 point ROS neg other than the symptoms noted above in the HPI.    PHYSICAL EXAMINATION:     /66 (BP Location: Left arm)  Pulse 58  Temp 96.1  F (35.6  C) (Oral)  Resp 14  Ht 1.829 m (6' 0.01\")  Wt 121.8 kg (268 lb 8.3 " oz)  SpO2 96%  BMI 36.41 kg/m2  Temp (24hrs), Av.6  F (36.4  C), Min:96.1  F (35.6  C), Max:98.2  F (36.8  C)      BMI= Body mass index is 36.41 kg/(m^2).      Intake/Output Summary (Last 24 hours) at 10/30/18 1938  Last data filed at 10/30/18 1833   Gross per 24 hour   Intake             1392 ml   Output              350 ml   Net             1042 ml       General: Alert, interactive, NAD.   HEENT: AT/NC. PERRLA. Anicteric.Moist MM.    Neck: Supple. No JVD. No Lymphadenopathy.  Heart/CVS: Normal S1 and S2. Regular. No m/r/g .   Chest/Respi: Non labored breathing. CTA BL.   Abdomen/GI: Soft, non distended, non tender. No g/r/r.   Extremities/MSK: Distal pulses 2+, well perfused. Rest per ortho.   Neuro: Alert and oriented x4. Cranial nerves II-XII are grossly intact.    Skin:  No new rash over exposed areas.       LABORATORY DATA: reviewed.     Recent Results (from the past 24 hour(s))   Glucose by meter    Collection Time: 10/30/18  6:16 AM   Result Value Ref Range    Glucose 82 70 - 99 mg/dL       Recent Results (from the past 24 hour(s))   XR Pelvis Port 1/2 Views    Narrative    Single view right hip radiographs 10/30/2018 9:32 AM    History: intra-op check/right total hip arthroplasty;     Comparison: 2018    Findings:    AP intraoperative view of the right hip/pelvis were obtained.     Suboptimal intraoperative and rotated patient film.    Intraoperative changes from right total hip arthroplasty. Surgical  defect along the soft tissues of the right hip. Acetabular and femoral  components are in place.     Surgical equipment projecting over the lower pelvis.      Impression    Impression:  Intraoperative changes from right total hip arthroplasty with  acetabular and femoral components in place.   XR Pelvis Port 1/2 Views    Narrative    Exam: Single frontal view of the pelvis dated 10/30/2018.    COMPARISON: Same day.    CLINICAL HISTORY: Postop evaluation.    FINDINGS: Single frontal view of the  pelvis was obtained. New  postsurgical changes of placement of a right total hip arthroplasty.  The hardware appears intact. Postoperative air is noted. The left hip  is unremarkable.      Impression    IMPRESSION: New postsurgical changes of placement of a right total hip  arthroplasty, without complication.    MD Isaiah CARSON MD

## 2018-10-30 NOTE — ANESTHESIA CARE TRANSFER NOTE
Patient: Jarod Obregon    Procedure(s):  Right Total Hip Arthroplasty    Diagnosis: Osteoarthritis  Diagnosis Additional Information: No value filed.    Anesthesia Type:   General     Note:  Airway :Face Mask  Patient transferred to:PACU  Handoff Report: Identifed the Patient, Identified the Reponsible Provider, Reviewed the pertinent medical history, Discussed the surgical course, Reviewed Intra-OP anesthesia mangement and issues during anesthesia, Set expectations for post-procedure period and Allowed opportunity for questions and acknowledgement of understanding      Vitals: (Last set prior to Anesthesia Care Transfer)    CRNA VITALS  10/30/2018 0935 - 10/30/2018 1012      10/30/2018             Resp Rate (observed): 10                Electronically Signed By: Kayli Zavala CRNA, APRN CRNA  October 30, 2018  10:12 AM

## 2018-10-30 NOTE — PLAN OF CARE
Focus: Transfer into room 812  D: Arrived alert and orientated times four. Pain controlled. Dressing clean, dry and intact. CMS intact. VSS. On 2 liters nasal cannula. P: Continue current plan of care.

## 2018-10-30 NOTE — PROGRESS NOTES
"Orthopaedic Surgery Progress Note   October 30, 2018    Subjective: No acute events post-operatively. Pain well controlled. +Nausea/emesis. Voiding spontaneously. Denies n/t.     Objective: /66 (BP Location: Left arm)  Pulse 58  Temp 96.1  F (35.6  C) (Oral)  Resp 14  Ht 1.829 m (6' 0.01\")  Wt 121.8 kg (268 lb 8.3 oz)  SpO2 96%  BMI 36.41 kg/m2    General: NAD, alert and oriented, cooperative with exam.   Cardio: RRR, extremities wwp.   Respiratory: Non-labored breathing.  MSK: RLE: Toes wwp, DP 2+, bcr in all toes. +EHL/FHL/GSC/TA with 5/5 strength. SILT SP/DP/Sa/Vidal/T. Dressing c/d/i.     Labs: Hgb and BMP tomorrow AM.     Assessment and Plan: Jarod Obregon is a 48 year old male with PMH including Vit D deficiency, RAMONA, hx of liver and kidney transplant on immunosuppression, HTN, and anxiety now s/p R ROBERTA on 10/30 with Dr. Scott.       Orthopaedic Surgery Primary  Activity: Up with assist until independent. Posterior hip precautions.  Weight bearing status: WBAT.  Pain management: Transition from IV to PO as tolerated.    Antibiotics: Ancef x 24 hours.  Diet: Begin with clear fluids and progress diet as tolerated.   DVT prophylaxis: ASA 81 mg BID x 4 weeks and SCDs.   Imaging: No further imaging needed at this time.   Labs: Hgb POD#1-3.  Bracing/Splinting: None.   Dressings: Keep clean, dry and intact x 7 days.   Physical Therapy/Occupational Therapy: Eval and treat.  Consults: IM.  Follow-up: Clinic with Nima Metzger PA-C in 2 weeks and with Dr. Scott in 6 weeks.    Disposition: Pending progress with therapies, pain control on orals, and medical stability, anticipate discharge to home on POD #2-3.     Vale Gonzalez MD  Orthopaedic Surgery Resident, PGY-4  Pager: [phone]     For questions about this patient during weekday business hours, please attempt to contact me at my pager prior to contacting the Orthopaedic Surgery resident on call. On the weekends and overnight, please page the " Orthopaedic Surgery resident on call. Thank you!

## 2018-10-30 NOTE — IP AVS SNAPSHOT
[facility]    [address]    [facility] [place] [address]    Phone:  [phone]                                       After Visit Summary   10/30/2018    Jarod Obregon    MRN: [id number]           After Visit Summary Signature Page     I have received my discharge instructions, and my questions have been answered. I have discussed any challenges I see with this plan with the nurse or doctor.    ..........................................................................................................................................  Patient/Patient Representative Signature      ..........................................................................................................................................  Patient Representative Print Name and Relationship to Patient    ..................................................               ................................................  Date                                   Time    ..........................................................................................................................................  Reviewed by Signature/Title    ...................................................              ..............................................  Date                                               Time          22EPIC Rev 08/18

## 2018-10-30 NOTE — OP NOTE
PREOPERATIVE DIAGNOSIS: Right hip osteonecrosis  POSTOPERATIVE DIAGNOSIS: Same as pre-op.  PROCEDURE: Right Total Hip Arthroplasty  DATE OF SURGERY: 10/30/2018   ASSISTANTS: Vale Gonzalez MD; FEI Vora    COMPONENTS USED:  1. Biomet Taperloc Size 12, High Offset  2. Biomet G7 Acetabular Component Size 60,   3. Biomet ArCOM XL Polyethylene Liner Neutral, 36 ID   4. Biolox Delta Ceramic Head Size 36+0   5. 1x40 Acetabular Screw 6.5 mm    ANESTHESIA: General   COMPLICATIONS: None  EBL: 200cc    SECONDARY DIAGNOSES: History of renal transplant, history of liver transplant, history of immunosuppressive medication use.    FINDINGS: Cartilage loss, irregularity of the femoral head, diffuse cartilage loss, osteophyte formation of the native acetabulum.  Acetabular component well positioned and secure with 1 screw.  Femoral component well positioned.  Intraoperative stability with full extension and ER, flexion to 90 with IR to 70, and in the sleep position.  Appropriate limb length.  Intraoperative imaging with trial femoral component confirmed appropriate position of the implants.        INDICATIONS: Jarod Obregon is a 48 year old male with longstanding history of right hip pain.  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  The patient was followed and noted to have failed conservative treatment options. Radiographs and preoperative clinical symptoms are consistent with osteonecrosis of the femoral head as the cause of the patient's pain.   We discussed the risks, benefits, and alternatives to total hip arthroplasty with the patient.  Please see clinic note for full details of the preoperative discussion.  Following that the discussion, the patient elected to proceed with total hip arthroplasty.       DESCRIPTION OF PROCEDURE: We met the patient in the preoperative area.  There we again reviewed the risks, benefits,  "and alternatives to total hip arthroplasty.  Informed written consent was obtained.  The operative right  hip was marked with an indelible marker after patient confirmation of the operative site.  All the patient's questions were answered.  The patient was taken to the operating room and underwent induction of  General  anesthesia.  The patient was placed on the operating table in the lateral decubitus position with the operative site up.   An SCD was placed on the nonoperative leg.   Bony prominences were well padded and was secured to the table with the Wixon positioner. The patient was prepped and draped in the normal sterile fashion.      A timeout was performed in which the patient's name, MRN, imaging, preoperative plan and laterality was reviewed.  We also confirmed that the patient received the appropriate preoperative IV antibiotics and 1 gm of TXA.       A posterior approach to the hip joint was performed making a incision over the greater trochanter and taking this down through the subcutaneous tissue. The gluteus madison and IT band was then split in line with its fibers. A charnley retractor was placed and hemostasis was obtained.  After internal rotation of the femur, the piriformis and obturator internus muscles were detached and the capsulotomy was performed in a standard \"L\" shape.  The capsule and pirformis was tagged.  The limb was positioned in the sleep position and a moira was made on the greater trochanter to help assess limb length and offset.  The hip was dislocated.  The soft tissues under the greater trochanter were debrided, exposing the neck.  The femoral neck osteotomy was made in accordance with the preoperative plan, approximately 10 mm above the level of the lesser trochanter.  The femoral head was removed and noted to have diffuse, extensive cartilage loss.     The acetabulum was exposed with an anterior retractor and posteroinferior retractor.  The remaining acetabular labrum was " removed.  The patient was noted to have anterior and inferior osteophytes.  The pulvinar was removed and the medial wall was identified.  We began reaming with care to maintain the anterior and posterior walls.  We began with a size 52 reamer and reamed to 59.  At this point the sclerotic bone was removed back to healthy bleeding cancelleous bone.  The 60 G7 cup was placed.  It had excellent initial stability.  Any prominent anterior and inferior osteophytes were debrided and carefully removed.  A neutral trial liner was placed.    Attention was turned to the femur, which was exposed in the standard fashion with a femoral elevator.  Care was taken to not damage the gluteus medius.  A box osteotome and rongeur was used to remove residual lateral neck.  The canal was identified and reamed with a Charnley Awl.  The femoral broaches was positioned in the appropriate anteversion, care was taken to broach the lateral cancellous bone.  We broached, starting with a size 4, up to size 11.  Care was taken to minimize risk of calcar fracture.  The final trial fit well.  A high offset neck was placed with a 36+0 head.  The hip was reduced.  There was slight posterior impingement and concern for anterior instability.  The cup was repositioned.  We again trialed and the stability was examined and  was found to be secure and stable in full extension and external rotation of > 45 degrees as well as flexion of 90 degrees combined with internal rotation > 70 degrees.  Leg lengths were judged to be within 5 mm of symmetry. An intraoperative x-ray was taken and we were happy with the position and the fit of the components.      The hip was dislocated and the trial femoral and cup liner were removed.   One acetabular screw was placed further securing the cup.  We broached up to a size 12 stem.  The cup was irrigated and the final neutral liner was placed.  The femur was exposed and the 12 stem with high offset was placed and noted to  fit well.  The hip was trialed with various head lengths and the 36+0 head had the best fit. .  Again, the  stability was examined and  was found to be secure and stable in full extension and external rotation of > 45 degrees as well as flexion of 90 degrees combined with internal rotation > 70 degrees.  Leg lengths were judged to be within 5 mm of symmetry as evaluated by limb palpation and the markings on the greater trochanter. The hip was dislocated and the trial head was removed.  The trunion was washed and dried and the final 36+0 head was impacted into place.  The hip was reduced and again stable as listed above.      The hip was lavaged with dilute betaine irrigant followed by sterile saline.  Periarticular injection of ropivicaine, toradol, and epi was injected into the soft tissue.  The capsule, piriformis and external rotators were repaired to the medius tendon.  A second dose of TXA was given.  The IT and gluteal fascia was closed with #1 stratafix.  The deep dermal layer was closed with 0 PDS and 2-0 spiral stratafix.  The skin was closed with 3-0 monocryl and dermabond and a sterile aquacel bandage was placed. The patient was positioned upright and awoken from anesthesia.  The patient was transferred to the PACU in stable condition.      POSTOPERATIVE PLAN:  The patient will be admitted to the floor for pain control and monitoring.  Weight bearing: WBAT  Anticoagulation: ASA 81 mg BID x 4 weeks  Precautions: Posterior hip precautions  Pain control and monitoring  ID: Routine post op ppx antibiotics   Continue mycophenolate and cyclosporine.

## 2018-10-30 NOTE — PLAN OF CARE
"Problem: Patient Care Overview  Goal: Plan of Care/Patient Progress Review  Discharge Planner PT   Patient plan for discharge: home with HHPT  Current status: Patient supine in bed upon arrival, wife is present, agreeable to participate in PT. Completed PT eval and initiated treatment. Completed supine bed exercises for R LE strengthening. Completed supine <>sit with Татьяна to RLE, sit<>stand with CGA and FWW. Patient ambulates 600' with FWW and CGA to SBA, he is able to follow hip precautions throughout all movement. Patient noted he was nauseous near end of session, sat EOB x5' where patient vomited, then commented that he was \"feeling much better now\". Patient supine in bed at end of session with needs met.   Barriers to return to prior living situation: bed mobility, transfers, ambulation, stairs, safety with functional mobilty  Recommendations for discharge: home with HHPT  Rationale for recommendations: patient will benefit from continued HHPT services upon discharge from hospital to maximize safety with functional mobility in his home.        Entered by: Yaquelin Gold 10/30/2018 4:51 PM           "

## 2018-10-30 NOTE — OR NURSING
PACU to Inpatient Nursing Handoff    Patient Jarod Obregon is a 48 year old male who speaks English.   Procedure Procedure(s):  Right Total Hip Arthroplasty   Surgeon(s) Primary: Nima Scott MD  Assisting: Alexandro Davis PA-C  Resident - Assisting: Vale Orozco MD     Allergies   Allergen Reactions     Paxil [Paroxetine] Other (See Comments)     Caused Severe Tremor     Cepacol Maximum Strength Rash     Chlorhexidine Rash     Phenol Rash       Isolation  [unfilled]     Past Medical History   has a past medical history of Alcoholic cirrhosis of liver (H); Anemia; Anemia in chronic kidney disease(285.21); Anxiety; Ascites; Bleeding disorder (H); Chronic infection; Dialysis care; Dialysis patient (H); Dizziness; Elevated blood pressure reading without diagnosis of hypertension; End stage renal disease (H); ESRD (end stage renal disease) (H); Generalized anxiety disorder; Generalized anxiety disorder; GI bleed; GI bleeding; Hepatic encephalopathy (H); Hepatorenal syndrome (H); High risk medications (not anticoagulants) long-term use; History of blood transfusion; Hydrocele; Hypertension; Hypogonadism male; Hypotension; Immunosuppressed status (H); Insomnia with sleep apnea, unspecified; Kidney replaced by transplant; Liver failure (H); Liver replaced by transplant (H); Myopathy; Obesity; Other and unspecified alcohol dependence, unspecified drinking behavior; Renal failure; Secondary hyperparathyroidism (of renal origin); Sleep apnea; Thrombocytopenia (H); and Unspecified vitamin D deficiency.    Anesthesia General   Dermatome Level     Preop Meds acetaminophen (Tylenol) - time given: 0646  celecoxib (Celebrex) - time given: 0647   Nerve block Not applicable   Intraop Meds fentanyl (Sublimaze): 450 mcg total  hydromorphone (Dilaudid): 1 mg total  ondansetron (Zofran): last given at 0946   Local Meds No   Antibiotics cefazolin (Ancef) - last given at 0930     Pain Patient Currently in  Pain: yes  Comfort: comfortably manageable   PACU meds  hydromorphone (Dilaudid): 1 mg (total dose) last given at 1100    PCA / epidural No   Capnography  yes   Telemetry ECG Rhythm: Sinus rhythm   Inpatient Telemetry Monitor Ordered? No        Labs Glucose Lab Results   Component Value Date    GLC 87 10/29/2018       Hgb Lab Results   Component Value Date    HGB 14.9 10/29/2018       INR Lab Results   Component Value Date    INR 1.17 02/03/2014      PACU Imaging Completed     Wound/Incision Incision/Surgical Site 05/15/13 Right Antecubital (Active)   Incision Assessment UTV 10/30/2018 10:54 AM   Closure GRABIEL 5/15/2013 12:30 PM   Dressing Intervention Clean, dry, intact 10/30/2018 10:54 AM   Number of days:1994       Incision/Surgical Site 10/30/18 Right Hip (Active)   Incision Assessment UTV 10/30/2018 10:54 AM   Closure Liquid bandage 10/30/2018  9:42 AM   Dressing Intervention Clean, dry, intact 10/30/2018 10:54 AM   Number of days:0      CMS Peripheral Neurovascular WDL:  WDL except (10/30/18 0636)  All Extremities Temperature: warm (10/30/18 1008)  All Extremities Color: no discoloration (10/30/18 1008)  All Extremities Sensation:  (grabiel sedation) (10/30/18 1008)   Equipment ice pack   Other LDA       IV Access Peripheral IV 10/30/18 Right Hand (Active)   Site Assessment WDL 10/30/2018 10:30 AM   Line Status Infusing 10/30/2018 10:30 AM   Phlebitis Scale 0-->no symptoms 10/30/2018 10:30 AM   Infiltration Scale 0 10/30/2018 10:30 AM   Infiltration Site Treatment Method  None 10/30/2018  7:10 AM   Extravasation? No 10/30/2018  7:10 AM   Dressing Intervention New dressing  10/30/2018  7:10 AM   Number of days:0      Blood Products Not applicable    mL   Intake/Output Date 10/30/18 0700 - 10/31/18 0659   Shift 8243-9235 4255-4246 6129-8496 24 Hour Total   I  N  T  A  K  E   I.V. 1000   1000    Shift Total  (mL/kg) 1000  (8.21)   1000  (8.21)   O  U  T  P  U  T   Urine 0   0    Blood 300   300    Shift  Total  (mL/kg) 300  (2.46)   300  (2.46)   Weight (kg) 121.8 121.8 121.8 121.8        Drains / Ivey     Time of void PreOp Void Prior to Procedure: 0500 (10/30/18 0636)    PostOp      Diapered? No   Bladder Scan     PO    tolerating sips     Vitals    B/P: 136/82  T: 98.2  F (36.8  C)    Temp src: Oral  P:  Pulse: 58 (10/30/18 0607)    Heart Rate: 63 (10/30/18 1045)     R: 11  O2:  SpO2: 97 %    O2 Device: Nasal cannula (10/30/18 1045)    Oxygen Delivery: 2 LPM (10/30/18 1045)         Family/support present significant other   Patient belongings Patient Belongings: clothing;shoes;cane  Disposition of Belongings: Other (see comment) (labeled and secured)   Patient transported on bed   DC meds/scripts (obs/outpt) Not applicable   Inpatient Pain Meds Released? Yes       Special needs/considerations IM consult, Resident is DR Vale Gonzalez   Tasks needing completion Very pleasant, wife at bedside       Ry Reyes, [place]  [place]COM [id number]

## 2018-10-30 NOTE — IP AVS SNAPSHOT
MRN:[id number]                      After Visit Summary   10/30/2018    Jarod Obregon    MRN: [id number]           Thank you!     Thank you for choosing [facility] for your care. Our goal is always to provide you with excellent care. Hearing back from our patients is one way we can continue to improve our services. Please take a few minutes to complete the written survey that you may receive in the mail after you visit with us. Thank you!        Patient Information     Date Of Birth          1970        Designated Caregiver       Most Recent Value    Caregiver    Will someone help with your care after discharge? yes    Name of designated caregiver Keerthi  [spouse]    Phone number of caregiver     Caregiver address [address] [place] [address]      About your hospital stay     You were admitted on:  October 30, 2018 You last received care in the:  [facility]    You were discharged on:  October 31, 2018        Reason for your hospital stay       You were hospitalized after surgery for pain control and rehabilitation.                  Who to Call     For medical emergencies, please call 911.  For non-urgent questions about your medical care, please call your primary care provider or clinic, [phone]  For questions related to your surgery, please call your surgery clinic        Attending Provider     Provider Specialty    Nima Scott MD Orthopedics       Primary Care Provider Office Phone # Fax #    Carrington Gil [phone]      After Care Instructions     Activity       Weight bearing as tolerated. Posterior hip precautions.            Diet       Follow this diet upon discharge: Orders Placed This Encounter    Regular diet            Discharge Instructions       Post Operative Instructions for Jarod Obregon is a 48 year old male    Surgery: Right Total Hip Replacement on 10/30.    If you have any questions contact Dr. Scott at the  following numbers: if you see Dr. Scott at [facility] call [phone].  If you see him at [facility] call [phone].      Follow up appointment:   You are scheduled to follow up with Dr. Scott approximately 2 weeks after your surgery.  If you were seen at [facility], your appointment will most likely be there.  If you were seen at the [facility], your appointment will likely be there.         Anticoagulation:   Take ASA 81 mg twice daily prescribed for the total of 4 weeks.  This is given to help minimize your risk of blood clot.    Activity:   -Continue to weight bear on your operative leg as tolerated by pain.  As you are more comfortable, you can discontinue use of the walker and transition to a cane.  Once you are comfortable with the cane, you can also wean from the cane and progress to using no assistive devices.  Do not transition until you are comfortable and have good balance.      -Follow the posterior hip precautions you were taught while at the hospital.        Pain Medications:   -Take pain medications as prescribed.  Wean off the the narcotic pain medications (Oxycodone, Dilaudid, etc) as tolerated by pain.  To help with this, take the Tylenol and Celebrex (if prescribed) to minimize the amount of narcotic pain medication you need to take.      -Do not drive while on pain medications or until your leg feels well enough to do so.      Bandage Care and Showering:   -You can shower with the adhesive bandage covering your knee at the time of discharge.    -Remove the adhesive bandage 10 days after your surgery.  This can be removed at home.  Once removed, it is common to have a few scabs.  Let the scabs fall off on their own - they will do so over the next week or two.  The sutures are resorbable and nothing needs to be removed.    --Once the bandage is removed, you can shower without covering the incision.  Do not soak or bathe the incision in water.  Do not scrub the incision.  Just let  the water from the shower run over the incision.    --Contact Dr. Scott or his staff if there is any drainage from the incision or redness.    Leg Swelling and Icing  -Continue icing the hip 5-6 times per day for approximately 20 minutes each time.  This will help with swelling.    -Some swelling in the leg is normal after surgery.  This type of swelling is usually gravity dependent and is improved in the morning after sleeping.  If the swelling is painful in the calf or the swelling is getting worse, contact Dr. Scott's office.  We may recommend presenting to the Emergency Department to obtain an ultrasound of the leg if there is concern for a DVT.      -Elevate the leg if you are sitting as this will help reduce swelling.    Contact clinic if you note any of the following:  -Fevers greater than 101.5 chills  -Increased pain, redness, swelling or discharge at the surgical site.   -During regular business hours call Dr Scott's office and request to speak with his nurse or the triage nurses. If you see him at [facility] call [phone]. If you see him at [facility] [place] call [phone].   -After hours or on weekends call the hospital  at [phone] and ask to speak with the Orthopaedic resident on call.            Discharge Instructions       Suppository if 3 days and no BM.  Can use miralax or prunes.  Call primary MD to arrange follow up.  Contact transplant coordinator to discuss follow up.                  Follow-up Appointments     Adult [facility] Follow-up and recommended labs and tests       You have an appointment with Nima Metzger PA-C on 11/16 and with Dr. Scott on 12/12.    [facility] [place] ([address] [place] [id number]). Call [phone] to schedule a follow-up appointment at this location with your provider.                  Your next 10 appointments already scheduled     Nov 16, 2018 11:00 AM CST   (Arrive by 10:45 AM)   Post-Op with Nima  Ho Metzger PA-C   [facility] ([facility])    [address]  4th Floor  [facility] [address] [phone]            Dec 12, 2018 11:30 AM CST   (Arrive by 11:15 AM)   RETURN HIP with Nima Scott MD   [facility] ([facility])    [address]  4th [address]  [facility] [address] [phone]            Jan 07, 2019  8:00 AM CST   LAB with  LAB   [facility] ([facility])    [address]  1st Floor  [facility] [id number] [phone]           Please do not eat 10-12 hours before your appointment if you are coming in fasting for labs on lipids, cholesterol, or glucose (sugar). This does not apply to pregnant women. Water, hot tea and black coffee (with nothing added) are okay. Do not drink other fluids, diet soda or chew gum.            Jan 08, 2019 12:10 PM CST   (Arrive by 11:55 AM)   Return Liver Transplant with Jeannine Biggs MD   [facility] Hepatology ([facility])    [address]  [address]  [facility] [address] [phone]            Jan 08, 2019  1:35 PM CST   (Arrive by 1:05 PM)   Return Kidney Transplant with  Kidney/Pancreas Recipient [address] [place] [address] Nephrology ([facility])    [address]  [address]  [facility] [id number] [phone]              Additional Services     Home Care PT Referral for Hospital Discharge       [facility]  Phone  [phone]  Fax  [phone]    PT to eval and treat    Your provider has ordered home care - physical therapy. If you have not been contacted within 2 days of your discharge please call the department phone number listed on the top of this document.            Home care nursing referral       [facility]  Phone  [phone]  Fax  [phone]    RN skilled nursing visit. RN to assess vital signs and weight,  "respiratory and cardiac status, pain level and activity tolerance, incision for signs/symptoms of infection, hydration, nutrition and bowel status and home safety.  RN to teach medication management.  RN to provide site care and management.    Your provider has ordered home care nursing services. If you have not been contacted within 2 days of your discharge please call the inpatient department phone number at [phone] .                  Pending Results     No orders found for last 3 day(s).            Statement of Approval     Ordered          10/31/18 1241  I have reviewed and agree with all the recommendations and orders detailed in this document.  EFFECTIVE NOW     Approved and electronically signed by:  Lay Bourgeois APRN CNP             Admission Information     Date & Time Provider Department Dept. Phone    10/30/2018 Nima Scott MD [facility] [phone]      Your Vitals Were     Blood Pressure Pulse Temperature Respirations Height Weight    166/94 (BP Location: Left arm) 58 97.7  F (36.5  C) (Oral) 16 1.829 m (6' 0.01\") 121.8 kg (268 lb 8.3 oz)    Pulse Oximetry BMI (Body Mass Index)                94% 36.41 kg/m2          Conference [facility] Information     [employer] gives you secure access to your electronic health record. If you see a primary care provider, you can also send messages to your care team and make appointments. If you have questions, please call your primary care clinic.  If you do not have a primary care provider, please call [phone] and they will assist you.        Care EveryWhere ID     This is your Care EveryWhere ID. This could be used by other organizations to access your [facility] medical records  [id number]        Equal Access to Services     ROYCE GAMEZ : Hadii maria del carmen Jane, wahaoda luqadaha, qaybta kaalmanidhi hallman. So [facility] [phone].    ATENCIÓN: Si habla español, tiene a membreno disposición servicios gratuitos de " asistencia lingüística. Vito al [phone].    We comply with applicable federal civil rights laws and [place] laws. We do not discriminate on the basis of race, color, national origin, age, disability, sex, sexual orientation, or gender identity.               Review of your medicines      START taking        Dose / Directions    HYDROmorphone 2 MG tablet   Commonly known as:  DILAUDID        Dose:  2-4 mg   Take 1-2 tablets (2-4 mg) by mouth every 3 hours as needed for moderate to severe pain   Quantity:  40 tablet   Refills:  0       order for DME        Equipment being ordered: Crutches () Treatment Diagnosis: impaired gait   Quantity:  1 each   Refills:  0       senna-docusate 8.6-50 MG per tablet   Commonly known as:  SENOKOT-S;PERICOLACE        Dose:  1 tablet   Take 1 tablet by mouth 2 times daily   Quantity:  30 tablet   Refills:  0         CONTINUE these medicines which may have CHANGED, or have new prescriptions. If we are uncertain of the size of tablets/capsules you have at home, strength may be listed as something that might have changed.        Dose / Directions    aspirin 81 MG chewable tablet   This may have changed:  when to take this        Dose:  81 mg   Take 1 tablet (81 mg) by mouth 2 times daily for 28 days   Quantity:  56 tablet   Refills:  0         CONTINUE these medicines which have NOT CHANGED        Dose / Directions    amLODIPine 5 MG tablet   Commonly known as:  NORVASC        Dose:  5 mg   Take 5 mg by mouth daily   Refills:  0       CHOLECALCIFEROL PO        Dose:  1 tablet   Take 1 tablet by mouth daily   Refills:  0       cycloSPORINE modified 25 MG capsule   Commonly known as:  GENERIC EQUIVALENT   Used for:  Liver replaced by transplant (H)        Dose:  75 mg   Take 3 capsules (75 mg) by mouth every 12 hours   Quantity:  540 capsule   Refills:  5       gabapentin 100 MG capsule   Commonly known as:  NEURONTIN        Take 6 capsules (600 mg) by mouth in the morning,  take 5 capsules (500 mg) by mouth in the evening.   Refills:  0       metoprolol tartrate 50 MG tablet   Commonly known as:  LOPRESSOR        Take 75 mg by mouth in the morning and take 50 mg by mouth in the evening.   Refills:  0       mometasone 0.1 % ointment   Commonly known as:  ELOCON        Apply topically 2 times daily as needed   Refills:  0       Multi-vitamin Tabs tablet        Dose:  1 tablet   Take 1 tablet by mouth daily   Refills:  0       mycophenolate 500 MG tablet   Commonly known as:  GENERIC EQUIVALENT   Used for:  Transplant        Dose:  1000 mg   Take 2 tablets (1,000 mg) by mouth every 12 hours   Quantity:  360 tablet   Refills:  3       sildenafil 50 MG tablet   Commonly known as:  VIAGRA   Used for:  ED (erectile dysfunction)        Dose:  25-50 mg   Take 0.5-1 tablets (25-50 mg) by mouth daily as needed for erectile dysfunction Take 30 min to 4 hours before intercourse.  Never use with nitroglycerin, terazosin or doxazosin.   Quantity:  12 tablet   Refills:  11       tamsulosin 0.4 MG capsule   Commonly known as:  FLOMAX   Used for:  Nocturia        Dose:  0.4 mg   Take 1 capsule (0.4 mg) by mouth daily   Quantity:  30 capsule   Refills:  1            Where to get your medicines      These medications were sent to [facility] [place], [place] - [address]  [address] [address]     Phone:  [phone]     aspirin 81 MG chewable tablet    senna-docusate 8.6-50 MG per tablet         Some of these will need a paper prescription and others can be bought over the counter. Ask your nurse if you have questions.     Bring a paper prescription for each of these medications     HYDROmorphone 2 MG tablet    order for DME                Protect others around you: Learn how to safely use, store and throw away your medicines at www.disposemymeds.org.        Information about OPIOIDS     PRESCRIPTION OPIOIDS: WHAT YOU NEED TO KNOW   We gave you an opioid  (narcotic) pain medicine. It is important to manage your pain, but opioids are not always the best choice. You should first try all the other options your care team gave you. Take this medicine for as short a time (and as few doses) as possible.    Some activities can increase your pain, such as bandage changes or therapy sessions. It may help to take your pain medicine 30 to 60 minutes before these activities. Reduce your stress by getting enough sleep, working on hobbies you enjoy and practicing relaxation or meditation. Talk to your care team about ways to manage your pain beyond prescription opioids.    These medicines have risks:    DO NOT drive when on new or higher doses of pain medicine. These medicines can affect your alertness and reaction times, and you could be arrested for driving under the influence (DUI). If you need to use opioids long-term, talk to your care team about driving.    DO NOT operate heavy machinery    DO NOT do any other dangerous activities while taking these medicines.    DO NOT drink any alcohol while taking these medicines.     If the opioid prescribed includes acetaminophen, DO NOT take with any other medicines that contain acetaminophen. Read all labels carefully. Look for the word  acetaminophen  or  Tylenol.  Ask your pharmacist if you have questions or are unsure.    You can get addicted to pain medicines, especially if you have a history of addiction (chemical, alcohol or substance dependence). Talk to your care team about ways to reduce this risk.    All opioids tend to cause constipation. Drink plenty of water and eat foods that have a lot of fiber, such as fruits, vegetables, prune juice, apple juice and high-fiber cereal. Take a laxative (Miralax, milk of magnesia, Colace, Senna) if you don t move your bowels at least every other day. Other side effects include upset stomach, sleepiness, dizziness, throwing up, tolerance (needing more of the medicine to have the same  effect), physical dependence and slowed breathing.    Store your pills in a secure place, locked if possible. We will not replace any lost or stolen medicine. If you don t finish your medicine, please throw away (dispose) as directed by your pharmacist. The [place] [employer]ution [employer] has more information about safe disposal: https://www.pca.[facility].[place].us/living-green/managing-unwanted-medications             Medication List: This is a list of all your medications and when to take them. Check marks below indicate your daily home schedule. Keep this list as a reference.      Medications           Morning Afternoon Evening Bedtime As Needed    amLODIPine 5 MG tablet   Commonly known as:  NORVASC   Take 5 mg by mouth daily   Last time this was given:  5 mg on 10/31/2018  8:03 AM                                aspirin 81 MG chewable tablet   Take 1 tablet (81 mg) by mouth 2 times daily for 28 days   Last time this was given:  81 mg on 10/31/2018  8:03 AM                                CHOLECALCIFEROL PO   Take 1 tablet by mouth daily                                cycloSPORINE modified 25 MG capsule   Commonly known as:  GENERIC EQUIVALENT   Take 3 capsules (75 mg) by mouth every 12 hours   Last time this was given:  75 mg on 10/31/2018  8:03 AM                                gabapentin 100 MG capsule   Commonly known as:  NEURONTIN   Take 6 capsules (600 mg) by mouth in the morning, take 5 capsules (500 mg) by mouth in the evening.   Last time this was given:  600 mg on 10/31/2018  7:59 AM                                HYDROmorphone 2 MG tablet   Commonly known as:  DILAUDID   Take 1-2 tablets (2-4 mg) by mouth every 3 hours as needed for moderate to severe pain   Last time this was given:  2 mg on 10/31/2018 12:15 PM                                metoprolol tartrate 50 MG tablet   Commonly known as:  LOPRESSOR   Take 75 mg by mouth in the morning and take 50 mg by mouth in the evening.   Last time this was  given:  75 mg on 10/31/2018  8:01 AM                                mometasone 0.1 % ointment   Commonly known as:  ELOCON   Apply topically 2 times daily as needed                                Multi-vitamin Tabs tablet   Take 1 tablet by mouth daily                                mycophenolate 500 MG tablet   Commonly known as:  GENERIC EQUIVALENT   Take 2 tablets (1,000 mg) by mouth every 12 hours   Last time this was given:  1,000 mg on 10/31/2018  8:03 AM                                order for DME   Equipment being ordered: Jamar () Treatment Diagnosis: impaired gait                                senna-docusate 8.6-50 MG per tablet   Commonly known as:  SENOKOT-S;PERICOLACE   Take 1 tablet by mouth 2 times daily   Last time this was given:  2 tablets on 10/31/2018  8:03 AM                                sildenafil 50 MG tablet   Commonly known as:  VIAGRA   Take 0.5-1 tablets (25-50 mg) by mouth daily as needed for erectile dysfunction Take 30 min to 4 hours before intercourse.  Never use with nitroglycerin, terazosin or doxazosin.                                tamsulosin 0.4 MG capsule   Commonly known as:  FLOMAX   Take 1 capsule (0.4 mg) by mouth daily

## 2018-10-30 NOTE — PROGRESS NOTES
" 10/30/18 1553   Quick Adds   Type of Visit Initial PT Evaluation       Present no   Language English   Living Environment   Lives With spouse   Living Arrangements house   Home Accessibility stairs to enter home;stairs within home   Number of Stairs to Enter Home 21   Number of Stairs Within Home 15   Stair Railings at Home inside, present on left side   Transportation Available family or friend will provide   Self-Care   Dominant Hand right   Usual Activity Tolerance good   Current Activity Tolerance moderate   Regular Exercise yes   Activity/Exercise Type walking   Exercise Amount/Frequency 5-7 times/wk   Equipment Currently Used at Home cane, straight;raised toilet;shower chair   Functional Level Prior   Ambulation 1-->assistive equipment   Transferring 1-->assistive equipment   Toileting 1-->assistive equipment   Bathing 1-->assistive equipment   Dressing 0-->independent   Eating 0-->independent   Communication 0-->understands/communicates without difficulty   Swallowing 0-->swallows foods/liquids without difficulty   Cognition 0 - no cognition issues reported   Fall history within last six months yes   Number of times patient has fallen within last six months 2   Which of the above functional risks had a recent onset or change? fall history;ambulation;transferring   Prior Functional Level Comment States that falls were due to balance deficits   General Information   Onset of Illness/Injury or Date of Surgery - Date 10/30/18   Referring Physician Vale Gonzalez MD   Patient/Family Goals Statement \"mobility basically, stairs\"   Pertinent History of Current Problem (include personal factors and/or comorbidities that impact the POC) R ROBERTA 2/2 R hip osteonecrosis   Precautions/Limitations fall precautions;right hip precautions   Weight-Bearing Status - LUE full weight-bearing   Weight-Bearing Status - RUE full weight-bearing   Weight-Bearing Status - LLE full weight-bearing " "  Weight-Bearing Status - RLE weight-bearing as tolerated   General Observations Patient supine in bed upon arrival, wife present, agreeable to participate in PT   General Info Comments Capno, IV, pulse-ox,    Cognitive Status Examination   Orientation orientation to person, place and time   Level of Consciousness alert   Follows Commands and Answers Questions 100% of the time   Personal Safety and Judgment intact   Memory intact   Pain Assessment   Patient Currently in Pain Yes, see Vital Sign flowsheet   Integumentary/Edema   Integumentary/Edema Comments Post-surgical swelling present   Posture    Posture Not impaired   Range of Motion (ROM)   ROM Comment R LE ROM limited by R hip precautions   Strength   Strength Comments R LE strength limited by pain 2/2 R ROBERTA   Bed Mobility   Bed Mobility Comments Sherwin for bed mobility for assist to R LE   Transfer Skills   Transfer Comments CGA for sit<>stand with FWW   Gait   Gait Comments CGA to SBA for ambulation with FWW   Balance   Balance Comments Not formally tested, balance impaired 2/2 pain, weakness. Patient states that balance was impaired prior to ROBERTA and that he has been using an SEC at home because of this. He states that, prior to surgery, his outpatient PT told him he was \"one point away from being a fall risk on the balance screen thing...\"   General Therapy Interventions   Planned Therapy Interventions balance training;bed mobility training;gait training;ROM;strengthening;transfer training;home program guidelines;risk factor education;progressive activity/exercise   Clinical Impression   Criteria for Skilled Therapeutic Intervention yes, treatment indicated   PT Diagnosis impaired functional mobility   Influenced by the following impairments weakness, pain, R hip precautions   Functional limitations due to impairments bed mobility, transfers, ambulation, stairs   Clinical Presentation Stable/Uncomplicated   Clinical Presentation Rationale R ROBERTA, mobilizing " "well   Clinical Decision Making (Complexity) Low complexity   Therapy Frequency` 2 times/day   Predicted Duration of Therapy Intervention (days/wks) 3   Anticipated Equipment Needs at Discharge (none)   Anticipated Discharge Disposition Home with Home Therapy   Risk & Benefits of therapy have been explained Yes   Patient, Family & other staff in agreement with plan of care Yes   Clinical Impression Comments Patient will benefit from HHPT upon discharge from hospital to maximize safety with functional mobility in his living environment.   [facility] AM-PAC TM \"6 Clicks\"   2016, Trustees of [facility], under license to [employer].  All rights reserved.   6 Clicks Short Forms Basic Mobility Inpatient Short Form   [facility] AM-PAC  \"6 Clicks\" V.2 Basic Mobility Inpatient Short Form   1. Turning from your back to your side while in a flat bed without using bedrails? 4 - None   2. Moving from lying on your back to sitting on the side of a flat bed without using bedrails? 3 - A Little   3. Moving to and from a bed to a chair (including a wheelchair)? 3 - A Little   4. Standing up from a chair using your arms (e.g., wheelchair, or bedside chair)? 3 - A Little   5. To walk in hospital room? 3 - A Little   6. Climbing 3-5 steps with a railing? 2 - A Lot   Basic Mobility Raw Score (Score out of 24.Lower scores equate to lower levels of function) 18   Total Evaluation Time   Total Evaluation Time (Minutes) 9     "

## 2018-10-30 NOTE — PLAN OF CARE
Problem: Patient Care Overview  Goal: Plan of Care/Patient Progress Review  Outcome: Improving    VS: Afebrile, vss    O2: 94-96 % on 2 liters nasal cannula   Output: No void yet    Last BM: LBM 10-29-18   Activity: Bedrest thus far.    Skin: Intact except for piv and incision lines.    Pain: Controlled. Dilaudid ivp given times one.    CMS: Baseline neuropathy in both feet. Otherwise wnl    Dressing: Aquacel clean,dry and intact.    Diet: Regular    LDA: PIV    Equipment: IVAC   Plan: Home vs rehab.     Additional Info: Pleasant and cooperative.

## 2018-10-30 NOTE — ANESTHESIA POSTPROCEDURE EVALUATION
Anesthesia POST Procedure Evaluation    Patient: Jarod Obregon   MRN:     [id number] Gender:   male   Age:    48 year old :      1970        Preoperative Diagnosis: Osteoarthritis   Procedure(s):  Right Total Hip Arthroplasty   Postop Comments: No value filed.       Anesthesia Type:  General    Reportable Event: NO     PAIN: Uncomplicated   Sign Out status: Comfortable, Well controlled pain     PONV: No PONV   Sign Out status:  No Nausea or Vomiting     Neuro/Psych: Uneventful perioperative course   Sign Out Status: Preoperative baseline; Age appropriate mentation     Airway/Resp.: Uneventful perioperative course   Sign Out Status: Non labored breathing, age appropriate RR; Resp. Status within EXPECTED Parameters     CV: Uneventful perioperative course   Sign Out status: Appropriate BP and perfusion indices; Appropriate HR/Rhythm     Disposition:   Sign Out in:  PACU  Recovery Course: Uneventful  Follow-Up: Not required           Last Anesthesia Record Vitals:  CRNA VITALS  10/30/2018 0914 - 10/30/2018 0944      10/30/2018             Pulse: (!)  49    Ht Rate: (!)  49    Temp 2: 36  C (96.8  F)    SpO2: 100 %    Resp Rate (observed): 12          Last PACU/Preop Vitals:  Vitals:    10/30/18 0607   BP: (!) 170/106   Pulse: 58   Resp: 16   Temp: 36.7  C (98.1  F)   SpO2: 97%         Electronically Signed By: José Miguel Virk MD, MD, 2018, 9:44 AM

## 2018-10-30 NOTE — LETTER
Transition Communication Hand-off for Care Transitions to Next Level of Care Provider    Name: Jarod Obregon  : 1970  MRN #: [id number]  Primary Care Provider: Carrington Gil     Primary Clinic: [facility] [address] [place] [address]     Reason for Hospitalization:  Status post total hip replacement, right [Z96.641]  Admit Date/Time: 10/30/2018  5:41 AM  Discharge Date: 10/31/2018  Payor Source: Payor: BLUE PLUS / Plan: [place] [employer]VANTAGE / Product Type: HMO /          Reason for Communication Hand-off Referral: Avoidable readmission within 30 days    Discharge Plan: Home with home care  [facility]  Phone  [phone]  Fax  [phone]    Discharge Needs Assessment:  Needs       Most Recent Value    Equipment Currently Used at Home cane, straight, raised toilet, dressing device [owns cane, built in shower bench]    Transportation Available car, family or friend will provide        Follow-up specialty is recommended: Yes    Follow-up plan:  Future Appointments  Date Time Provider Department Center   10/31/2018 2:45 PM Kristine Rockwell, PT [facility]   2018 6:30 AM UR PT OVERFLOW [facility]   2018 2:00 PM Kristine Rockwell, PT [facility]   2018 11:00 AM Nima Metzger PA-C [facility]   2018 11:30 AM Nima Scott MD [facility]   2019 8:00 AM  LAB [facility]   2019 12:10 PM Jeannine Biggs MD [facility]   2019 1:35 PM ,  Kidney/Pancreas Recipient [facility]       Any outstanding tests or procedures:        Referrals     Future Labs/Procedures    Home care nursing referral     Comments:    [facility]  Phone  [phone]  Fax  [phone]    RN skilled nursing visit. RN to assess vital signs and weight, respiratory and cardiac status, pain level and activity tolerance, incision for signs/symptoms of infection, hydration, nutrition and bowel status and home  safety.  RN to teach medication management.  RN to provide site care and management.    Your provider has ordered home care nursing services. If you have not been contacted within 2 days of your discharge please call the inpatient department phone number at [phone] .    Home Care PT Referral for Hospital Discharge     Comments:    [facility]  Phone  [phone]  Fax  [phone]    PT to eval and treat    Your provider has ordered home care - physical therapy. If you have not been contacted within 2 days of your discharge please call the department phone number listed on the top of this document.            Trudi Lloyd RN, BSN  Care Coordinator, 8A  Phone [phone]  Pager [phone]    AVS/Discharge Summary is the source of truth; this is a helpful guide for improved communication of patient story

## 2018-10-30 NOTE — BRIEF OP NOTE
[facility], [place]    Brief Operative Note    Pre-operative diagnosis: Osteoarthritis  Post-operative diagnosis * No post-op diagnosis entered *  Procedure: Procedure(s):  Right Total Hip Arthroplasty  Surgeon: Surgeon(s) and Role:     * Nima Scott MD - Primary     * Vale Orozco MD - Resident - Assisting     * Alexandro Davis PA-C - Assisting  Anesthesia: General   Estimated blood loss: 200 ml  Drains: None  Specimens: * No specimens in log *  Findings:   None.  Complications: None.  Implants: See dictated operative report.    Assessment and Plan: Jarod Obregon is a 48 year old male with PMH including Vit D deficiency, RAMONA, hx of liver and kidney transplant on immunosuppression, HTN, and anxiety now s/p R ROBERTA on 10/30 with Dr. Scott.      Orthopaedic Surgery Primary  Activity: Up with assist until independent. Posterior hip precautions.  Weight bearing status: WBAT.  Pain management: Transition from IV to PO as tolerated.    Antibiotics: Ancef x 24 hours.  Diet: Begin with clear fluids and progress diet as tolerated.   DVT prophylaxis: ASA 81 mg BID x 4 weeks and SCDs.   Imaging: PACU.  Labs: Hgb POD#1-3.  Bracing/Splinting: None.   Dressings: Keep clean, dry and intact x 7 days.   Physical Therapy/Occupational Therapy: Eval and treat.  Consults: IM.  Follow-up: Clinic with Nima Metzger PA-C in 2 weeks and with Dr. Scott in 6 weeks.    Disposition: Pending progress with therapies, pain control on orals, and medical stability, anticipate discharge to home on POD #2-3.    Vale Gonzalez MD  Orthopaedic Surgery Resident, PGY-4  Pager: [phone]    For questions about this patient during weekday business hours, please attempt to contact me at my pager prior to contacting the Orthopaedic Surgery resident on call. On the weekends and overnight, please page the Orthopaedic Surgery resident on call. Thank you!

## 2018-10-31 ENCOUNTER — APPOINTMENT (OUTPATIENT)
Dept: OCCUPATIONAL THERAPY | Facility: CLINIC | Age: 48
End: 2018-10-31
Attending: ORTHOPAEDIC SURGERY
Payer: COMMERCIAL

## 2018-10-31 ENCOUNTER — APPOINTMENT (OUTPATIENT)
Dept: PHYSICAL THERAPY | Facility: CLINIC | Age: 48
End: 2018-10-31
Attending: ORTHOPAEDIC SURGERY
Payer: COMMERCIAL

## 2018-10-31 VITALS
TEMPERATURE: 97.7 F | HEART RATE: 58 BPM | BODY MASS INDEX: 36.37 KG/M2 | DIASTOLIC BLOOD PRESSURE: 82 MMHG | OXYGEN SATURATION: 94 % | HEIGHT: 72 IN | RESPIRATION RATE: 16 BRPM | WEIGHT: 268.52 LBS | SYSTOLIC BLOOD PRESSURE: 133 MMHG

## 2018-10-31 LAB
ANION GAP SERPL CALCULATED.3IONS-SCNC: 5 MMOL/L (ref 3–14)
BUN SERPL-MCNC: 18 MG/DL (ref 7–30)
CALCIUM SERPL-MCNC: 8.6 MG/DL (ref 8.5–10.1)
CHLORIDE SERPL-SCNC: 106 MMOL/L (ref 94–109)
CO2 SERPL-SCNC: 29 MMOL/L (ref 20–32)
CREAT SERPL-MCNC: 1.15 MG/DL (ref 0.66–1.25)
ERYTHROCYTE [DISTWIDTH] IN BLOOD BY AUTOMATED COUNT: 14 % (ref 10–15)
GFR SERPL CREATININE-BSD FRML MDRD: 68 ML/MIN/1.7M2
GLUCOSE BLDC GLUCOMTR-MCNC: 83 MG/DL (ref 70–99)
GLUCOSE SERPL-MCNC: 90 MG/DL (ref 70–99)
HCT VFR BLD AUTO: 40.2 % (ref 40–53)
HGB BLD-MCNC: 13.2 G/DL (ref 13.3–17.7)
MCH RBC QN AUTO: 27.3 PG (ref 26.5–33)
MCHC RBC AUTO-ENTMCNC: 32.8 G/DL (ref 31.5–36.5)
MCV RBC AUTO: 83 FL (ref 78–100)
PLATELET # BLD AUTO: 158 10E9/L (ref 150–450)
POTASSIUM SERPL-SCNC: 4.1 MMOL/L (ref 3.4–5.3)
RBC # BLD AUTO: 4.83 10E12/L (ref 4.4–5.9)
SODIUM SERPL-SCNC: 140 MMOL/L (ref 133–144)
WBC # BLD AUTO: 7.9 10E9/L (ref 4–11)

## 2018-10-31 PROCEDURE — 25000132 ZZH RX MED GY IP 250 OP 250 PS 637: Performed by: NURSE PRACTITIONER

## 2018-10-31 PROCEDURE — 97535 SELF CARE MNGMENT TRAINING: CPT | Mod: GO | Performed by: OCCUPATIONAL THERAPIST

## 2018-10-31 PROCEDURE — 25000132 ZZH RX MED GY IP 250 OP 250 PS 637: Performed by: INTERNAL MEDICINE

## 2018-10-31 PROCEDURE — 97110 THERAPEUTIC EXERCISES: CPT | Mod: GP | Performed by: PHYSICAL THERAPIST

## 2018-10-31 PROCEDURE — 00000146 ZZHCL STATISTIC GLUCOSE BY METER IP

## 2018-10-31 PROCEDURE — 25000131 ZZH RX MED GY IP 250 OP 636 PS 637: Performed by: STUDENT IN AN ORGANIZED HEALTH CARE EDUCATION/TRAINING PROGRAM

## 2018-10-31 PROCEDURE — 80048 BASIC METABOLIC PNL TOTAL CA: CPT | Performed by: INTERNAL MEDICINE

## 2018-10-31 PROCEDURE — 25000128 H RX IP 250 OP 636: Performed by: STUDENT IN AN ORGANIZED HEALTH CARE EDUCATION/TRAINING PROGRAM

## 2018-10-31 PROCEDURE — 36415 COLL VENOUS BLD VENIPUNCTURE: CPT | Performed by: INTERNAL MEDICINE

## 2018-10-31 PROCEDURE — 99207 ZZC APP CREDIT; MD BILLING SHARED VISIT: CPT | Performed by: PHYSICIAN ASSISTANT

## 2018-10-31 PROCEDURE — 99232 SBSQ HOSP IP/OBS MODERATE 35: CPT | Performed by: INTERNAL MEDICINE

## 2018-10-31 PROCEDURE — 25000132 ZZH RX MED GY IP 250 OP 250 PS 637: Performed by: STUDENT IN AN ORGANIZED HEALTH CARE EDUCATION/TRAINING PROGRAM

## 2018-10-31 PROCEDURE — 40000193 ZZH STATISTIC PT WARD VISIT: Performed by: PHYSICAL THERAPIST

## 2018-10-31 PROCEDURE — 40000133 ZZH STATISTIC OT WARD VISIT: Performed by: OCCUPATIONAL THERAPIST

## 2018-10-31 PROCEDURE — 97116 GAIT TRAINING THERAPY: CPT | Mod: GP | Performed by: PHYSICAL THERAPIST

## 2018-10-31 PROCEDURE — 97165 OT EVAL LOW COMPLEX 30 MIN: CPT | Mod: GO | Performed by: OCCUPATIONAL THERAPIST

## 2018-10-31 PROCEDURE — 85027 COMPLETE CBC AUTOMATED: CPT | Performed by: INTERNAL MEDICINE

## 2018-10-31 PROCEDURE — 97530 THERAPEUTIC ACTIVITIES: CPT | Mod: GP | Performed by: PHYSICAL THERAPIST

## 2018-10-31 RX ORDER — HYDROMORPHONE HYDROCHLORIDE 2 MG/1
2-4 TABLET ORAL
Qty: 40 TABLET | Refills: 0 | Status: SHIPPED | OUTPATIENT
Start: 2018-10-31 | End: 2020-01-07

## 2018-10-31 RX ORDER — HYDROMORPHONE HYDROCHLORIDE 2 MG/1
2 TABLET ORAL ONCE
Status: COMPLETED | OUTPATIENT
Start: 2018-10-31 | End: 2018-10-31

## 2018-10-31 RX ORDER — HYDROMORPHONE HYDROCHLORIDE 2 MG/1
2-4 TABLET ORAL
Status: DISCONTINUED | OUTPATIENT
Start: 2018-10-31 | End: 2018-10-31 | Stop reason: HOSPADM

## 2018-10-31 RX ADMIN — OXYCODONE HYDROCHLORIDE 5 MG: 5 TABLET ORAL at 04:47

## 2018-10-31 RX ADMIN — AMLODIPINE BESYLATE 5 MG: 5 TABLET ORAL at 08:03

## 2018-10-31 RX ADMIN — GABAPENTIN 600 MG: 300 CAPSULE ORAL at 07:59

## 2018-10-31 RX ADMIN — METOPROLOL TARTRATE 75 MG: 50 TABLET, FILM COATED ORAL at 08:01

## 2018-10-31 RX ADMIN — HYDROMORPHONE HYDROCHLORIDE 2 MG: 2 TABLET ORAL at 12:15

## 2018-10-31 RX ADMIN — ASPIRIN 81 MG CHEWABLE TABLET 81 MG: 81 TABLET CHEWABLE at 08:03

## 2018-10-31 RX ADMIN — CYCLOSPORINE 75 MG: 25 CAPSULE, LIQUID FILLED ORAL at 08:03

## 2018-10-31 RX ADMIN — SENNOSIDES AND DOCUSATE SODIUM 2 TABLET: 8.6; 5 TABLET ORAL at 08:03

## 2018-10-31 RX ADMIN — CEFAZOLIN SODIUM 2 G: 2 INJECTION, SOLUTION INTRAVENOUS at 00:16

## 2018-10-31 RX ADMIN — OXYCODONE HYDROCHLORIDE 10 MG: 5 TABLET ORAL at 07:59

## 2018-10-31 RX ADMIN — HYDROMORPHONE HYDROCHLORIDE 2 MG: 2 TABLET ORAL at 15:43

## 2018-10-31 RX ADMIN — MYCOPHENOLATE MOFETIL 1000 MG: 500 TABLET ORAL at 08:03

## 2018-10-31 RX ADMIN — HYDROMORPHONE HYDROCHLORIDE 2 MG: 2 TABLET ORAL at 09:24

## 2018-10-31 ASSESSMENT — ACTIVITIES OF DAILY LIVING (ADL)
ADLS_ACUITY_SCORE: 16

## 2018-10-31 NOTE — PLAN OF CARE
Problem: Patient Care Overview  Goal: Plan of Care/Patient Progress Review  Discharge Planner PT   Patient plan for discharge: home with home care  Current status: Pt walked 200' with wheeled walker.  Verbalized 3/3 hip precautions correctly. Ordered crutches for home for stair climbing proficiency.  Barriers to return to prior living situation: none  Recommendations for discharge: home with home PT and spouse  Rationale for recommendations: see below    Physical Therapy Discharge Summary    Reason for therapy discharge:    Discharged to home with home therapy.    Progress towards therapy goal(s). See goals on Care Plan in [facility] electronic health record for goal details.  Goals partially met.  Barriers to achieving goals:   Pt made good priogress after pain med change.  .    Therapy recommendation(s):    Continued therapy is recommended.  Rationale/Recommendations:  Wife observed both session.  Home PT for safety eval and to continue to assist with strengthening and increase independence with mobility. .  Continue home exercise program.           Entered by: Kristine Rockwell 10/31/2018 3:07 PM

## 2018-10-31 NOTE — DISCHARGE SUMMARY
ORTHOPAEDIC SURGERY DISCHARGE SUMMARY     Date of Admission: 10/30/2018  Date of Discharge: 10/31/2018  Disposition: Home  Staff Physician: Nima Scott MD  Primary Care Provider: Carrington Gil    DISCHARGE DIAGNOSIS:  Osteoarthritis    PROCEDURES: Procedure(s):  Right Total Hip Arthroplasty on 10/30/2018    BRIEF HISTORY:  Jarod Obregon is a 48 year old male with longstanding history of right hip pain.  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  The patient was followed and noted to have failed conservative treatment options. Radiographs and preoperative clinical symptoms are consistent with osteonecrosis of the femoral head as the cause of the patient's pain.     HOSPITAL COURSE:    The patient was admitted following the above listed procedures for pain control and rehabilitation. Jarod Obregon did well post-operatively. Medicine was consulted post operatively to aid in management of medical co-morbidities. The patient received routine nursing cares and at the time of discharge was medically stable. Vital signs were stable throughout admission. The patient is tolerating a regular diet and is voiding spontaneously. All PT/OT goals have been met for safe mobility. Pain is now controlled on oral medications which will be available on discharge. Stool softeners have been used while taking pain medications to help prevent constipation. Jarod Obregon is deemed medically safe to discharge.     Antibiotics:  Ancef given periop and 24 hours postop.   DVT prophylaxis:  ASA 81 mg BID initiated after surgery and will be continued for 4 weeks.   PT Progress:  Has met PT/OT goals for safe mobility.    Pain Meds:  Weaned off all IV pain meds by discharge.  Inpatient Events: No significant events or complications.     PHYSICAL EXAM:    General: NAD, alert and oriented, cooperative with exam.    Cardio: RRR, extremities wwp.   Respiratory: Non-labored breathing.  MSK: RLE: Toes wwp, DP 2+, bcr in all toes. +EHL/FHL/GSC/TA with 5/5 strength. SILT SP/DP/Sa/Vidal/T. Dressing c/d/i.      FOLLOWUP:    Future Appointments  Date Time Provider Department Center   11/16/2018 11:00 AM Nima Metzger PA-C [facility]   12/12/2018 11:30 AM Nima Scott MD [facility]     Orthopaedic Surgery appointments are at the [facility] [place] ([address] [place] [address]). Call [phone] to schedule a follow-up appointment at this location with your provider.     PLANNED DISCHARGE ORDERS:      Current Discharge Medication List      START taking these medications    Details   HYDROmorphone (DILAUDID) 2 MG tablet Take 1-2 tablets (2-4 mg) by mouth every 3 hours as needed for moderate to severe pain  Qty: 40 tablet, Refills: 0    Associated Diagnoses: Status post hip replacement, right      order for DME Equipment being ordered: Crutches ()  Treatment Diagnosis: impaired gait  Qty: 1 each, Refills: 0    Associated Diagnoses: Status post hip replacement, right      senna-docusate (SENOKOT-S;PERICOLACE) 8.6-50 MG per tablet Take 1 tablet by mouth 2 times daily  Qty: 30 tablet, Refills: 0    Associated Diagnoses: Status post hip replacement, right         CONTINUE these medications which have CHANGED    Details   aspirin 81 MG chewable tablet Take 1 tablet (81 mg) by mouth 2 times daily for 28 days  Qty: 56 tablet, Refills: 0    Associated Diagnoses: Status post hip replacement, right         CONTINUE these medications which have NOT CHANGED    Details   amLODIPine (NORVASC) 5 MG tablet Take 5 mg by mouth daily      CHOLECALCIFEROL PO Take 1 tablet by mouth daily      cycloSPORINE modified (GENERIC EQUIVALENT) 25 MG capsule Take 3 capsules (75 mg) by mouth every 12 hours  Qty: 540 capsule, Refills: 5    Associated Diagnoses: Liver replaced by transplant (H)      gabapentin  (NEURONTIN) 100 MG capsule Take 6 capsules (600 mg) by mouth in the morning, take 5 capsules (500 mg) by mouth in the evening.      metoprolol (LOPRESSOR) 50 MG tablet Take 75 mg by mouth in the morning and take 50 mg by mouth in the evening.      multivitamin, therapeutic with minerals (MULTI-VITAMIN) TABS tablet Take 1 tablet by mouth daily      mycophenolate (GENERIC EQUIVALENT) 500 MG tablet Take 2 tablets (1,000 mg) by mouth every 12 hours  Qty: 360 tablet, Refills: 3    Associated Diagnoses: Transplant      mometasone (ELOCON) 0.1 % ointment Apply topically 2 times daily as needed      sildenafil (VIAGRA) 50 MG tablet Take 0.5-1 tablets (25-50 mg) by mouth daily as needed for erectile dysfunction Take 30 min to 4 hours before intercourse.  Never use with nitroglycerin, terazosin or doxazosin.  Qty: 12 tablet, Refills: 11    Associated Diagnoses: ED (erectile dysfunction)      tamsulosin (FLOMAX) 0.4 MG capsule Take 1 capsule (0.4 mg) by mouth daily  Qty: 30 capsule, Refills: 1    Associated Diagnoses: Nocturia               Discharge Procedure Orders  Home care nursing referral   Referral Type: Home Health Therapies & Aides     Home Care PT Referral for Hospital Discharge   Referral Type: Home Health Therapies & Aides     Reason for your hospital stay   Order Comments: You were hospitalized after surgery for pain control and rehabilitation.     Activity   Order Comments: Weight bearing as tolerated. Posterior hip precautions.   Order Specific Question Answer Comments   Is discharge order? Yes      Discharge Instructions   Order Comments: Post Operative Instructions for Jarod Obregon is a 48 year old male    Surgery: Right Total Hip Replacement on 10/30.    If you have any questions contact Dr. Scott at the following numbers: if you see Dr. Scott at [facility] call [phone].  If you see him at [facility] [place] call [phone].      Follow up appointment:   You are scheduled to follow up  with Dr. Scott approximately 2 weeks after your surgery.  If you were seen at [facility], your appointment will most likely be there.  If you were seen at the [facility], your appointment will likely be there.         Anticoagulation:   Take ASA 81 mg twice daily prescribed for the total of 4 weeks.  This is given to help minimize your risk of blood clot.    Activity:   -Continue to weight bear on your operative leg as tolerated by pain.  As you are more comfortable, you can discontinue use of the walker and transition to a cane.  Once you are comfortable with the cane, you can also wean from the cane and progress to using no assistive devices.  Do not transition until you are comfortable and have good balance.      -Follow the posterior hip precautions you were taught while at the hospital.        Pain Medications:   -Take pain medications as prescribed.  Wean off the the narcotic pain medications (Oxycodone, Dilaudid, etc) as tolerated by pain.  To help with this, take the Tylenol and Celebrex (if prescribed) to minimize the amount of narcotic pain medication you need to take.      -Do not drive while on pain medications or until your leg feels well enough to do so.      Bandage Care and Showering:   -You can shower with the adhesive bandage covering your knee at the time of discharge.    -Remove the adhesive bandage 10 days after your surgery.  This can be removed at home.  Once removed, it is common to have a few scabs.  Let the scabs fall off on their own - they will do so over the next week or two.  The sutures are resorbable and nothing needs to be removed.    --Once the bandage is removed, you can shower without covering the incision.  Do not soak or bathe the incision in water.  Do not scrub the incision.  Just let the water from the shower run over the incision.    --Contact Dr. Scott or his staff if there is any drainage from the incision or redness.    Leg Swelling and Icing  -Continue icing the hip 5-6  times per day for approximately 20 minutes each time.  This will help with swelling.    -Some swelling in the leg is normal after surgery.  This type of swelling is usually gravity dependent and is improved in the morning after sleeping.  If the swelling is painful in the calf or the swelling is getting worse, contact Dr. Scott's office.  We may recommend presenting to the Emergency Department to obtain an ultrasound of the leg if there is concern for a DVT.      -Elevate the leg if you are sitting as this will help reduce swelling.    Contact clinic if you note any of the following:  -Fevers greater than 101.5 chills  -Increased pain, redness, swelling or discharge at the surgical site.   -During regular business hours call Dr Scott's office and request to speak with his nurse or the triage nurses. If you see him at [facility] call [phone]. If you see him at [facility] [place] call [phone].   -After hours or on weekends call the hospital  at [phone] and ask to speak with the Orthopaedic resident on call.     Adult [facility] Follow-up and recommended labs and tests   Order Comments: You have an appointment with Nima Metzger PA-C on 11/16 and with Dr. Scott on 12/12.    [facility] [place] ([address] [place] [address]). Call [phone] to schedule a follow-up appointment at this location with your provider.     Discharge Instructions   Order Comments: Suppository if 3 days and no BM.  Can use miralax or prunes.  Call primary MD to arrange follow up.  Contact transplant coordinator to discuss follow up.     Diet   Order Comments: Follow this diet upon discharge: Orders Placed This Encounter   Regular diet   Order Specific Question Answer Comments   Is discharge order? Yes          Vale Gonzalez MD  Orthopaedic Surgery Resident, PGY-4  Pager: [phone]

## 2018-10-31 NOTE — PLAN OF CARE
Problem: Hip Arthroplasty (Total, Partial) (Adult)  Goal: Signs and Symptoms of Listed Potential Problems Will be Absent, Minimized or Managed (Hip Arthroplasty)  Signs and symptoms of listed potential problems will be absent, minimized or managed by discharge/transition of care (reference Hip Arthroplasty (Total, Partial) (Adult) CPG).   Outcome: Adequate for Discharge Date Met: 10/31/18    VS: VSS, BP elevated this morning possibly d/t pain but down to 133/82 at end of shift.   O2: >90% on RA   Output: Pt voiding in urinal in adequate amounts   Last BM: LBM 10/31 per pt report (small BM), passing flatus and BS active   Activity: Pt up with SBA and walker, up with therapies. Passed all therapy goals.    Skin: Skin intact ex for incision, skin is warm and dry.    Pain: Pain is being managed with PRN dilaudid 2-4 mg Q3H, changed from oxycodone 5-10 Q3H. Pt states pain is much improved.    CMS: CMS intact ex for baseline neuropathy in BLE, able to wiggle toes and DP +2 bilaterally.    Dressing: Dressing to R hip CDI, no drainage visible. Ice applied   Diet: Tolerating regular diet without N/V, adequate intake of PO fluids   LDA: PIV removed   Equipment: Walker, personal belongings at bedside.    Plan: Continue to monitor patient and manage pain. Discharge home with home PT. Spouse will transport home.    Additional Info: Kidney and liver transplant patient, on immunosuppression.

## 2018-10-31 NOTE — PROGRESS NOTES
Care Coordinator Progress Note    Admission Date/Time:  10/30/2018  Attending MD:  Nima Scott MD    Data  Chart reviewed, discussed with interdisciplinary team.   Patient was admitted for: Status post hip replacement, right.    Concerns with insurance coverage for discharge needs: None.  Current Living Situation: Patient lives with spouse.  Support System: Supportive and Involved  Services Involved: Home Care  Transportation at Discharge: Family or friend will provide  Transportation to Medical Appointments:   - Name of caregiver: Keerthi, wife  Barriers to Discharge: None    Coordination of Care and Referrals: Provided patient/family with options for Home Care. Met with patient and his wife at bedside to discuss discharge planning. A referral for skilled nursing and physical therapy was sent to [facility]. No further discharge concerns at this time. All therapy orders completed. RNCC available as needed.    [facility]  Phone  [phone]  Fax  [phone]     Plan  Anticipated Discharge Date:  10/31/2018  Anticipated Discharge Plan:  Home with home care    Trudi Lloyd RN, BSN  Care Coordinator,   Phone [phone]  Pager [phone]

## 2018-10-31 NOTE — PROGRESS NOTES
"[facility], [place]    Internal Medicine Progress Note       Assessment & Plan   Jarod Obregon is a 48 year old man with a history of HTN, RAMONA, and alcoholic cirrhosis w/ hepatorenal syndrome s/p liver and kidney transplant in 2013 who is admitted following R ROBERTA on 10/30/18.     #s/p R ROBERTA. POD #1. Dr. Scott. No complications noted. Pain better controlled w/ switch from oxycodone to oral Dilaudid today.   - Management per primary team.   - DVT proph w/ ASA 81 mg BID x 4 wks.     #History of Liver and Kidney Transplant. 2013. Good graft function. Cr 1.15 today. Was switched from sirolimus (delays wound healing) to cyclosporine in anticipation of surgery.   - Continue cyclosporine and mycophenolate for immunosuppression.   - Daily BMP. No need for drug levels at this time.   - Catherine Vargas transplant coordinator and aware of admission.     #HTN. BP 160s/90s this AM but pain was uncontrolled. Prior to that BPs were controlled.   - Continue home metoprolol and amlodipine.     #RAMONA. Not using dental appliance at this time.     Medicine will follow.     Lesly cMmanus PA-C  Hospitalist Service  [facility]  Pager: [phone]    Interval History   Was having pain this morning. Feels better with switch from oxycodone to Dilaudid. This worked better for pain with his transplant as well. No abdominal pain or N/V. No cough, SOB, f/c, chest pain.     A 4 point ROS was performed (including CV, Resp, GI, ) and negative unless otherwise noted in HPI.     Physical Exam   BP (!) 166/94 (BP Location: Left arm)  Pulse 58  Temp 97.7  F (36.5  C) (Oral)  Resp 16  Ht 1.829 m (6' 0.01\")  Wt 121.8 kg (268 lb 8.3 oz)  SpO2 94%  BMI 36.41 kg/m2     GENERAL: Alert and oriented x 3. Lying in bed, appears comfortable. Pleasant and conversant. Wife at bedside .  HEENT: Anicteric sclera. Mucous membranes moist.   CV: RRR. S1, S2. No murmurs appreciated.   RESPIRATORY: Effort normal " on room air. Lungs CTAB with no wheezing, rales, rhonchi.   GI: Abdomen soft, non distended non tender .  NEUROLOGICAL: No focal deficits. Moves all extremities.    EXTREMITIES: No peripheral edema. Intact bilateral pedal pulses.   SKIN: No jaundice. No rashes.     Lines/Tubes/Drains  PIV     Data reviewed today: I reviewed all medications, new labs and imaging results over the last 24 hours.

## 2018-10-31 NOTE — PLAN OF CARE
Problem: Patient Care Overview  Goal: Plan of Care/Patient Progress Review  Outcome: Improving  Pt on capno.  VS stable.  neuros intact ex baseline neuropathy.  Pt pain managed with IV dilaudid 0.5mg.  Pt complained of right calf pain.  Doctor knows about it.  Continue to monitor.  Pt was nauseous and vomiting most of shift.  Gave zofran, and then compazine.  Compazine helped relieve nausea.  Pt still on clear liquid diet, but Has crackers to try for nausea.  Pt having some urinary retention.  Voided 50cc around 1830, PVR with 625ml.  Straight cathed around 2000 for 700cc.  Pt still says he does not feel like he has the urge to void.  Pushing fluids.  Pt able to make needs known.  Call light within reach.  Will continue to monitor.

## 2018-10-31 NOTE — PLAN OF CARE
Problem: Patient Care Overview  Goal: Plan of Care/Patient Progress Review  Outcome: Completed Date Met: 10/31/18  Pt  Discharge complete.   Riding with wife, transported by wheelchair to car.  Instructions given, no questions asked.

## 2018-10-31 NOTE — PROGRESS NOTES
10/31/18 0948   Quick Adds   Type of Visit Initial Occupational Therapy Evaluation   Living Environment   Lives With spouse;child(sadie), dependent  (14 y/o son)   Living Arrangements house   Home Accessibility stairs to enter home;stairs within home   Number of Stairs to Enter Home 21   Number of Stairs Within Home 15   Transportation Available car;family or friend will provide   Living Environment Comment Pt planning to stay on 1st level which has a bath with walk in shower and a place he can sleep; otherwise main bedroom is on 2nd level; pt's spouse is taking off work for as long as needed to assist   Self-Care   Dominant Hand right   Usual Activity Tolerance good   Current Activity Tolerance moderate   Regular Exercise yes   Activity/Exercise Type walking   Exercise Amount/Frequency 5-7 times/wk   Equipment Currently Used at Home cane, straight;raised toilet;dressing device  (owns cane, built in shower bench)   Activity/Exercise/Self-Care Comment Pt was using cane for mobility prior to surgery   Functional Level Prior   Ambulation 1-->assistive equipment   Transferring 1-->assistive equipment   Toileting 1-->assistive equipment   Bathing 1-->assistive equipment   Dressing 0-->independent   Eating 0-->independent   Communication 0-->understands/communicates without difficulty   Swallowing 0-->swallows foods/liquids without difficulty   Cognition 0 - no cognition issues reported   Fall history within last six months yes   Number of times patient has fallen within last six months 2   Which of the above functional risks had a recent onset or change? ambulation;transferring;toileting;bathing;dressing;fall history   Prior Functional Level Comment Pt was (I) with all ADL/IADLs including working from home as a  and driving   General Information   Onset of Illness/Injury or Date of Surgery - Date 10/30/18   Referring Physician Vale Gonzalez MD   Additional Occupational Profile Info/Pertinent  History of Current Problem Pt is a 48 year old man with a history of HTN, RAMONA, and alcoholic cirrhosis w/ hepatorenal syndrome s/p liver and kidney transplant in 2013 who is admitted following R ROBERTA on 10/30/18   Precautions/Limitations right hip precautions   Weight-Bearing Status - LUE full weight-bearing   Weight-Bearing Status - RUE full weight-bearing   Weight-Bearing Status - LLE full weight-bearing   Weight-Bearing Status - RLE weight-bearing as tolerated   Cognitive Status Examination   Orientation orientation to person, place and time   Level of Consciousness alert   Able to Follow Commands WNL/WFL   Personal Safety (Cognitive) WNL/WFL   Memory intact   Cognitive Comment Pt reports hx of issues with cognition prior to transplant; no concerns since tx   Visual Perception   Visual Perception Wears glasses  (for reading; no new concerns)   Sensory Examination   Sensory Quick Adds Other (describe)   Sensory Comments Pt has neuropathy in BLEs from knee>feet   Pain Assessment   Patient Currently in Pain Yes, see Vital Sign flowsheet   Range of Motion (ROM)   ROM Comment [facility]   Strength   Strength Comments [facility]   Mobility   Bed Mobility Bed mobility skill: Scooting/Bridging;Bed mobility skill: Sit to supine;Bed mobility skill: Supine to sit   Bed Mobility Skill: Scooting/Bridging   Level of [place]: Scooting/Bridging stand-by assist   Bed Mobility Skill: Sit to Supine   Level of [place]: Sit/Supine stand-by assist   Bed Mobility Skill: Supine to Sit   Level of [place]: Supine/Sit stand-by assist   Transfer Skill: Sit to Stand   Level of [place]: Sit/Stand stand-by assist   Lower Body Dressing   Level of [place]: Dress Lower Body maximum assist (25% patients effort)   Physical Assist/Nonphysical Assist: Dress Lower Body 1 person assist   Activities of Daily Living Analysis   Impairments Contributing to Impaired Activities of Daily Living balance impaired;pain;post surgical  "precautions   General Therapy Interventions   Planned Therapy Interventions ADL retraining;IADL retraining;bed mobility training;ROM;strengthening;stretching;transfer training;home program guidelines;progressive activity/exercise   Clinical Impression   Criteria for Skilled Therapeutic Interventions Met yes, treatment indicated   OT Diagnosis decreased ADL/IADL (I)   Influenced by the following impairments pain, surgical precautions, impaired balance   Assessment of Occupational Performance 5 or more Performance Deficits   Identified Performance Deficits functional transfers, toileting, bathing, dressing, household chores, driving   Clinical Decision Making (Complexity) Low complexity   Therapy Frequency daily   Predicted Duration of Therapy Intervention (days/wks) eval + 1 tx   Anticipated Equipment Needs at Discharge dressing equipment   Anticipated Discharge Disposition Home with Assist   Risks and Benefits of Treatment have been explained. Yes   Patient, Family & other staff in agreement with plan of care Yes   [facility] TM \"6 Clicks\"   2016, Trustees of [facility], under license to [employer].  All rights reserved.   6 Clicks Short Forms Daily Activity Inpatient Short Form   [facility]  \"6 Clicks\" Daily Activity Inpatient Short Form   1. Putting on and taking off regular lower body clothing? 2 - A Lot   2. Bathing (including washing, rinsing, drying)? 3 - A Little   3. Toileting, which includes using toilet, bedpan or urinal? 4 - None   4. Putting on and taking off regular upper body clothing? 4 - None   5. Taking care of personal grooming such as brushing teeth? 4 - None   6. Eating meals? 4 - None   Daily Activity Raw Score (Score out of 24.Lower scores equate to lower levels of function) 21   Total Evaluation Time   Total Evaluation Time (Minutes) 6     "

## 2018-10-31 NOTE — PROGRESS NOTES
"Orthopaedic Surgery Progress Note   October 31, 2018    Subjective: No acute events overnight. Pain controlled. Nausea resolved. Voiding spontaneously. +Flatus, no BM. Walked the hallway yesterday.      Objective: BP (!) 163/91 (BP Location: Left arm)  Pulse 58  Temp 98.4  F (36.9  C) (Oral)  Resp 13  Ht 1.829 m (6' 0.01\")  Wt 121.8 kg (268 lb 8.3 oz)  SpO2 98%  BMI 36.41 kg/m2    General: NAD, alert and oriented, cooperative with exam.   Cardio: RRR, extremities wwp.   Respiratory: Non-labored breathing.  MSK: RLE: Toes wwp, DP 2+, bcr in all toes. +EHL/FHL/GSC/TA with 5/5 strength. SILT SP/DP/Sa/Vidal/T. Dressing c/d/i.     Labs: CBC and BMP this AM.     Assessment and Plan: Jarod Obregon is a 48 year old male with PMH including Vit D deficiency, RAMONA, hx of liver and kidney transplant on immunosuppression, HTN, and anxiety now s/p R ROBERTA on 10/30 with Dr. Scott.       Orthopaedic Surgery Primary  Activity: Up with assist until independent. Posterior hip precautions.  Weight bearing status: WBAT.  Pain management: Transition from IV to PO as tolerated.    Antibiotics: Ancef x 24 hours.  Diet: Begin with clear fluids and progress diet as tolerated.   DVT prophylaxis: ASA 81 mg BID x 4 weeks and SCDs.   Imaging: No further imaging needed at this time.   Labs: Hgb POD#1-3.  Bracing/Splinting: None.   Dressings: Keep clean, dry and intact x 7 days.   Physical Therapy/Occupational Therapy: Eval and treat.  Consults: IM.  Follow-up: Clinic with Nima Metzger PA-C in 2 weeks and with Dr. Scott in 6 weeks.    Disposition: Pending progress with therapies, pain control on orals, and medical stability, anticipate discharge to home today or tomorrow.      Vale Gonzalez MD  Orthopaedic Surgery Resident, PGY-4  Pager: [phone]     For questions about this patient during weekday business hours, please attempt to contact me at my pager prior to contacting the Orthopaedic Surgery resident on call. On the weekends " and overnight, please page the Orthopaedic Surgery resident on call. Thank you!

## 2018-10-31 NOTE — PLAN OF CARE
Problem: Patient Care Overview  Goal: Plan of Care/Patient Progress Review  Discharge Planner OT   Patient plan for discharge: Home with assist  Current status: OT eval and tx completed. Pt aware of surgical precautions - writer provided education on impact of restrictions on ADLs/activity and compensatory strategies/AE for tasks. Pt able to demonstrate LE dressing with mod (I) using AE after education and walk in shower transfer with SBA. Pt is mod (I) with toilet transfer/toileting. Pt has all necessary AE in place and no concerns regarding discharge home; has met all OT goals.  Barriers to return to prior living situation: pain, impaired balance, surgical precautions  Recommendations for discharge: Home with initial SBA for shower transfer  Rationale for recommendations: pt has met all OT goals and is safe for discharge home later today       Entered by: Alissa Aquino 10/31/2018 12:08 PM   Occupational Therapy Discharge Summary    Reason for therapy discharge:    All goals and outcomes met, no further needs identified.    Progress towards therapy goal(s). See goals on Care Plan in [facility] electronic health record for goal details.  Goals met    Therapy recommendation(s):    No further therapy is recommended.

## 2018-10-31 NOTE — PLAN OF CARE
"Problem: Patient Care Overview  Goal: Plan of Care/Patient Progress Review  Discharge Planner PT   Patient plan for discharge: home with spouse and home PT  Current status: Min A with transfers,  Walking 75'x2, climbed stairs with cues and supervision.  Pt feels like oxycodone does \"nothing\" for him. Limited by pain. Declined sitting at EOB for meal due to feel mildly lightheaded from medications.   Barriers to return to prior living situation: limited endurance, pain control,   Recommendations for discharge: home with home PT and spouse  Rationale for recommendations: PT to continue to work on maximizing independence with bed mobility, transfers and gait endurance for safe return to home.        Entered by: Kristine Rockwell 10/31/2018 9:14 AM           "

## 2018-10-31 NOTE — PROGRESS NOTES
[facility]  Met with pt and spouse to discuss plans for HC.  Pt to be discharged home 10 31 18  and has agreed to have [facility] follow with services of SN and  PT.  Patient care support center processing referral.  Pt and spouse verbalized understanding that initial visit is scheduled for 11 01 18 or 11 02 18.    Pt has 24 hour phone number for [facility] for any questions or concerns.

## 2018-10-31 NOTE — PLAN OF CARE
Problem: Patient Care Overview  Goal: Plan of Care/Patient Progress Review  Pt. A&Ox4. VSS. Afebrile. Lung sounds CTA. Maintaining sats on RA. IS encouraged. Bowel sounds active, LBM 10/29, flatus +. PP+ DP+. CMS and neuro's are intact. Reports no change to baseline numbness in BLEs. Denies nausea, shortness of breath, and chest pain. Pt denies R hip pain, declined prn pain medications until 0445 when he took 5mg Oxycodone, ice applied. BS for 354mL, about an hour later voided 125mL, then 200mL. Tolerating CLs overnight, nausea improved, advanced to regular this am. Tolerated crackers and applesauce overnight. R hip incisional dressing is CDI. Pt up with ax1. PIV is patent and infusing. PCD's on BLE's. Bilateral heels are elevated off the bed. Call light is within reach, pt able to make needs known and is resting comfortably between cares. Will continue to monitor.

## 2018-11-01 ENCOUNTER — PATIENT OUTREACH (OUTPATIENT)
Dept: CARE COORDINATION | Facility: CLINIC | Age: 48
End: 2018-11-01

## 2018-11-01 NOTE — PROGRESS NOTES
Patient has clinic visit within 24-48 hours of Discharge so no post DC follow up call is needed    Met with pt and spouse to discuss plans for HC.  Pt to be discharged home 10 31 18  and has agreed to have [facility] follow with services of SN and  PT.  Patient care support center processing referral.  Pt and spouse verbalized understanding that initial visit is scheduled for 11 01 18 or 11 02 18.    Pt has 24 hour phone number for [facility] for any questions or concerns.

## 2018-11-02 ENCOUNTER — TELEPHONE (OUTPATIENT)
Dept: ORTHOPEDICS | Facility: CLINIC | Age: 48
End: 2018-11-02

## 2018-11-02 NOTE — TELEPHONE ENCOUNTER
[facility] Call Center    Phone Message    May a detailed message be left on voicemail: yes    Reason for Call: Order(s): Home Care Orders: Physical Therapy (PT): 2x/wk for 2 wks, for total hip exercises.    Action Taken: Message routed to:  [facility] ([facility]): Orthopedics

## 2018-11-09 ASSESSMENT — ENCOUNTER SYMPTOMS
CHILLS: 0
POOR WOUND HEALING: 0
POLYDIPSIA: 0
ALTERED TEMPERATURE REGULATION: 0
POLYPHAGIA: 0
WEIGHT LOSS: 0
FATIGUE: 0
HALLUCINATIONS: 0
NIGHT SWEATS: 0
INCREASED ENERGY: 0
SKIN CHANGES: 0
WEIGHT GAIN: 0
FEVER: 0
DECREASED APPETITE: 0
NAIL CHANGES: 0

## 2018-11-16 ENCOUNTER — OFFICE VISIT (OUTPATIENT)
Dept: ORTHOPEDICS | Facility: CLINIC | Age: 48
End: 2018-11-16
Payer: COMMERCIAL

## 2018-11-16 DIAGNOSIS — Z98.890 STATUS POST HIP SURGERY: Primary | ICD-10-CM

## 2018-11-16 NOTE — PROGRESS NOTES
REASON FOR VIST/CC: Follow-up appointment following right total hip arthroplasty with Dr. Scott on 10/30/2018    HISTORY OF PRESENT ILLNESS:  Jarod Quesada is a 48 year old male who is approximately 2 weeks status post right total hip arthroplasty with Dr. Scott.  Jarod reports that he is doing extremely well postoperatively.  He is currently ambulating without the use of assistive devices and feels stable doing so.  With regard to pain control, he is using acetaminophen occasionally.  He has discontinued all narcotic medications and has been comfortable.  He is participating in physical therapy and is tolerating exercises without concerns.    Jarod denies any swelling or redness around the surgical incision.  He denies the presence of discharge.  He denies any recent fevers or chills and other symptoms concerning for infection.    She is currently taking aspirin 81 mg twice daily for DVT prophylaxis.  He denies calf pain, calf tenderness, shortness of breath, chest pain.    MEDICATIONS:   Current Outpatient Rx   Medication Sig Dispense Refill     amLODIPine (NORVASC) 5 MG tablet Take 5 mg by mouth daily       aspirin 81 MG chewable tablet Take 1 tablet (81 mg) by mouth 2 times daily for 28 days 56 tablet 0     CHOLECALCIFEROL PO Take 1 tablet by mouth daily       cycloSPORINE modified (GENERIC EQUIVALENT) 25 MG capsule Take 3 capsules (75 mg) by mouth every 12 hours 540 capsule 5     gabapentin (NEURONTIN) 100 MG capsule Take 6 capsules (600 mg) by mouth in the morning, take 5 capsules (500 mg) by mouth in the evening.       metoprolol (LOPRESSOR) 50 MG tablet Take 75 mg by mouth in the morning and take 50 mg by mouth in the evening.       mometasone (ELOCON) 0.1 % ointment Apply topically 2 times daily as needed       multivitamin, therapeutic with minerals (MULTI-VITAMIN) TABS tablet Take 1 tablet by mouth daily       mycophenolate (GENERIC EQUIVALENT) 500 MG tablet Take 2 tablets (1,000 mg) by mouth every  12 hours 360 tablet 3     order for DME Equipment being ordered: Crutches ()  Treatment Diagnosis: impaired gait 1 each 0     sildenafil (VIAGRA) 50 MG tablet Take 0.5-1 tablets (25-50 mg) by mouth daily as needed for erectile dysfunction Take 30 min to 4 hours before intercourse.  Never use with nitroglycerin, terazosin or doxazosin. 12 tablet 11     HYDROmorphone (DILAUDID) 2 MG tablet Take 1-2 tablets (2-4 mg) by mouth every 3 hours as needed for moderate to severe pain (Patient not taking: Reported on 11/16/2018) 40 tablet 0     senna-docusate (SENOKOT-S;PERICOLACE) 8.6-50 MG per tablet Take 1 tablet by mouth 2 times daily (Patient not taking: Reported on 11/16/2018) 30 tablet 0     tamsulosin (FLOMAX) 0.4 MG capsule Take 1 capsule (0.4 mg) by mouth daily (Patient not taking: Reported on 10/9/2018) 30 capsule 1         ALLERGIES: Paxil [paroxetine]; Cepacol maximum strength; Chlorhexidine; and Phenol       PHYSICAL EXAMINATION:   On physical examination the patient is comfortable and is in no acute distress. The affect is appropriate, and breathing is non-labored.    Surgical wound: The surgical wound was exposed and found to be clean and dry without surrounding erythema or edema.  The skin was appropriately warm to touch.  There is no discharge present.     Jarod is ambulating with a slightly antalgic gait.      ASSESSMENT/PLAN:  Jarod Quesada is a 48 year old male is status post right total hip arthroplasty with Dr. Scott.  He is doing well postoperatively.    Basic wound cares were performed at today's visit. Jarod was instructed about continued wound/incision cares.  We again reviewed the importance of maintaining the posterior hip precautions, especially since he is feeling so well and is looking to return to activities soon.    The patient will see Dr. Scott at six weeks post op. Jarod has our clinic number and will call with any questions or concerns.    Nima Metzger PA-C  Orthopaedic Surgery          Answers for HPI/ROS submitted by the patient on 11/9/2018   General Symptoms: Yes  Skin Symptoms: Yes  HENT Symptoms: No  EYE SYMPTOMS: No  HEART SYMPTOMS: No  LUNG SYMPTOMS: No  INTESTINAL SYMPTOMS: No  URINARY SYMPTOMS: No  REPRODUCTIVE SYMPTOMS: No  SKELETAL SYMPTOMS: No  BLOOD SYMPTOMS: No  NERVOUS SYSTEM SYMPTOMS: No  MENTAL HEALTH SYMPTOMS: No  Fever: No  Loss of appetite: No  Weight loss: No  Weight gain: No  Fatigue: No  Night sweats: No  Chills: No  Increased stress: No  Excessive hunger: No  Excessive thirst: No  Feeling hot or cold when others believe the temperature is normal: No  Loss of height: No  Post-operative complications: No  Surgical site pain: Yes  Hallucinations: No  Change in or Loss of Energy: No  Hyperactivity: No  Confusion: No  Changes in hair: No  Changes in moles/birth marks: No  Itching: Yes  Rashes: Yes  Changes in nails: No  Acne: No  Change in facial hair: No  Warts: No  Non-healing sores: No  Scarring: No  Flaking of skin: No  Color changes of hands/feet in cold : No  Sun sensitivity: No  Skin thickening: No

## 2018-11-16 NOTE — MR AVS SNAPSHOT
After Visit Summary   11/16/2018    Jarod Quesada    MRN: [id number]           Patient Information     Date Of Birth          1970        Visit Information        Provider Department      11/16/2018 11:00 AM Nima Metzger PA-C [facility]        Today's Diagnoses     Status post hip surgery    -  1       Follow-ups after your visit        Your next 10 appointments already scheduled     Dec 12, 2018 11:30 AM CST   (Arrive by 11:15 AM)   RETURN HIP with Nima Scott MD   [facility] ([facility])    [address] [place] [address]  [address] [facility] [id number] [phone]            Jan 07, 2019  8:00 AM CST   LAB with  LAB   [facility] ([facility])    [address] [place] [address]  [address]st [facility] [id number] [phone]           Please do not eat 10-12 hours before your appointment if you are coming in fasting for labs on lipids, cholesterol, or glucose (sugar). This does not apply to pregnant women. Water, hot tea and black coffee (with nothing added) are okay. Do not drink other fluids, diet soda or chew gum.            Jan 08, 2019  1:35 PM CST   (Arrive by 1:05 PM)   Return Kidney Transplant with  Kidney/Pancreas Recipient 1   [facility] ([facility])    [address] [place] [address]  [address]  [facility] [id number] [phone]            Jan 11, 2019 10:00 AM CST   (Arrive by 9:45 AM)   Return Liver Transplant with Jeannine Biggs MD   [facility] ([facility])    [address] [place] [address]  [address]  [facility] [id number] [phone]              Who to contact     Please call your clinic at [phone] to:    Ask questions about your health    Make or cancel appointments    Discuss your medicines    Learn about your test results    Speak to your doctor            Additional Information About Your  Visit        [employer] Information     [employer] gives you secure access to your electronic health record. If you see a primary care provider, you can also send messages to your care team and make appointments. If you have questions, please call your primary care clinic.  If you do not have a primary care provider, please call [phone] and they will assist you.      [employer] is an electronic gateway that provides easy, online access to your medical records. With [employer], you can request a clinic appointment, read your test results, renew a prescription or communicate with your care team.     To access your existing account, please contact your [facility] or call [phone] for assistance.        Care EveryWhere ID     This is your Care EveryWhere ID. This could be used by other organizations to access your [facility] medical records  [id number]         Blood Pressure from Last 3 Encounters:   10/31/18 133/82   10/09/18 (!) 148/92   01/26/18 147/86    Weight from Last 3 Encounters:   10/30/18 121.8 kg (268 lb 8.3 oz)   10/09/18 121.2 kg (267 lb 1.6 oz)   08/08/18 115.2 kg (254 lb)              Today, you had the following     No orders found for display       Primary Care Provider Office Phone # Fax #    Carrington Gil [phone]       [facility] [address] [place] [address]        Equal Access to Services     [facility]: Hadii aad ku hadasho Soomaali, waaxda luqadaha, qaybta kaalmada adeegyada, nidhi dubose hayeduardo multani . So [facility] [phone].    ATENCIÓN: Si habla español, tiene a membreno disposición servicios gratuitos de asistencia lingüística. Llame al [phone].    We comply with applicable federal civil rights laws and [place] laws. We do not discriminate on the basis of race, color, national origin, age, disability, sex, sexual orientation, or gender identity.            Thank you!     Thank you for choosing  [facility]  for your care. Our goal is always to provide you with excellent care. Hearing back from our patients is one way we can continue to improve our services. Please take a few minutes to complete the written survey that you may receive in the mail after your visit with us. Thank you!             Your Updated Medication List - Protect others around you: Learn how to safely use, store and throw away your medicines at www.disposemymeds.org.          This list is accurate as of 11/16/18  2:58 PM.  Always use your most recent med list.                   Brand Name Dispense Instructions for use Diagnosis    amLODIPine 5 MG tablet    NORVASC     Take 5 mg by mouth daily        aspirin 81 MG chewable tablet     56 tablet    Take 1 tablet (81 mg) by mouth 2 times daily for 28 days    Status post hip replacement, right       CHOLECALCIFEROL PO      Take 1 tablet by mouth daily        cycloSPORINE modified 25 MG capsule    GENERIC EQUIVALENT    540 capsule    Take 3 capsules (75 mg) by mouth every 12 hours    Liver replaced by transplant (H)       gabapentin 100 MG capsule    NEURONTIN     Take 6 capsules (600 mg) by mouth in the morning, take 5 capsules (500 mg) by mouth in the evening.        HYDROmorphone 2 MG tablet    DILAUDID    40 tablet    Take 1-2 tablets (2-4 mg) by mouth every 3 hours as needed for moderate to severe pain    Status post hip replacement, right       metoprolol tartrate 50 MG tablet    LOPRESSOR     Take 75 mg by mouth in the morning and take 50 mg by mouth in the evening.        mometasone 0.1 % ointment    ELOCON     Apply topically 2 times daily as needed        Multi-vitamin Tabs tablet      Take 1 tablet by mouth daily        mycophenolate 500 MG tablet    GENERIC EQUIVALENT    360 tablet    Take 2 tablets (1,000 mg) by mouth every 12 hours    Transplant       order for DME     1 each    Equipment being ordered: Crutches () Treatment Diagnosis: impaired gait    Status  post hip replacement, right       senna-docusate 8.6-50 MG per tablet    SENOKOT-S;PERICOLACE    30 tablet    Take 1 tablet by mouth 2 times daily    Status post hip replacement, right       sildenafil 50 MG tablet    VIAGRA    12 tablet    Take 0.5-1 tablets (25-50 mg) by mouth daily as needed for erectile dysfunction Take 30 min to 4 hours before intercourse.  Never use with nitroglycerin, terazosin or doxazosin.    ED (erectile dysfunction)       tamsulosin 0.4 MG capsule    FLOMAX    30 capsule    Take 1 capsule (0.4 mg) by mouth daily    Nocturia

## 2018-11-16 NOTE — LETTER
11/16/2018       RE: Jarod Quesada  [address] [place] [address]     Dear Colleague,    Thank you for referring your patient, Jarod Quesada, to the [facility] at [facility]. Please see a copy of my visit note below.    REASON FOR VIST/CC: Follow-up appointment following right total hip arthroplasty with Dr. Scott on 10/30/2018    HISTORY OF PRESENT ILLNESS:  Jarod Quesada is a 48 year old male who is approximately 2 weeks status post right total hip arthroplasty with Dr. Scott.  Jarod reports that he is doing extremely well postoperatively.  He is currently ambulating without the use of assistive devices and feels stable doing so.  With regard to pain control, he is using acetaminophen occasionally.  He has discontinued all narcotic medications and has been comfortable.  He is participating in physical therapy and is tolerating exercises without concerns.    Jarod denies any swelling or redness around the surgical incision.  He denies the presence of discharge.  He denies any recent fevers or chills and other symptoms concerning for infection.    She is currently taking aspirin 81 mg twice daily for DVT prophylaxis.  He denies calf pain, calf tenderness, shortness of breath, chest pain.    MEDICATIONS:   Current Outpatient Rx   Medication Sig Dispense Refill     amLODIPine (NORVASC) 5 MG tablet Take 5 mg by mouth daily       aspirin 81 MG chewable tablet Take 1 tablet (81 mg) by mouth 2 times daily for 28 days 56 tablet 0     CHOLECALCIFEROL PO Take 1 tablet by mouth daily       cycloSPORINE modified (GENERIC EQUIVALENT) 25 MG capsule Take 3 capsules (75 mg) by mouth every 12 hours 540 capsule 5     gabapentin (NEURONTIN) 100 MG capsule Take 6 capsules (600 mg) by mouth in the morning, take 5 capsules (500 mg) by mouth in the evening.       metoprolol (LOPRESSOR) 50 MG tablet Take 75 mg by mouth in the morning and take 50 mg by mouth  in the evening.       mometasone (ELOCON) 0.1 % ointment Apply topically 2 times daily as needed       multivitamin, therapeutic with minerals (MULTI-VITAMIN) TABS tablet Take 1 tablet by mouth daily       mycophenolate (GENERIC EQUIVALENT) 500 MG tablet Take 2 tablets (1,000 mg) by mouth every 12 hours 360 tablet 3     order for DME Equipment being ordered: Crutches ()  Treatment Diagnosis: impaired gait 1 each 0     sildenafil (VIAGRA) 50 MG tablet Take 0.5-1 tablets (25-50 mg) by mouth daily as needed for erectile dysfunction Take 30 min to 4 hours before intercourse.  Never use with nitroglycerin, terazosin or doxazosin. 12 tablet 11     HYDROmorphone (DILAUDID) 2 MG tablet Take 1-2 tablets (2-4 mg) by mouth every 3 hours as needed for moderate to severe pain (Patient not taking: Reported on 11/16/2018) 40 tablet 0     senna-docusate (SENOKOT-S;PERICOLACE) 8.6-50 MG per tablet Take 1 tablet by mouth 2 times daily (Patient not taking: Reported on 11/16/2018) 30 tablet 0     tamsulosin (FLOMAX) 0.4 MG capsule Take 1 capsule (0.4 mg) by mouth daily (Patient not taking: Reported on 10/9/2018) 30 capsule 1         ALLERGIES: Paxil [paroxetine]; Cepacol maximum strength; Chlorhexidine; and Phenol       PHYSICAL EXAMINATION:   On physical examination the patient is comfortable and is in no acute distress. The affect is appropriate, and breathing is non-labored.    Surgical wound: The surgical wound was exposed and found to be clean and dry without surrounding erythema or edema.  The skin was appropriately warm to touch.  There is no discharge present.     Jarod is ambulating with a slightly antalgic gait.      ASSESSMENT/PLAN:  Jarod Sagastumeabbeybinu is a 48 year old male is status post right total hip arthroplasty with Dr. Scott.  He is doing well postoperatively.    Basic wound cares were performed at today's visit. Jarod was instructed about continued wound/incision cares.  We again reviewed the importance of  maintaining the posterior hip precautions, especially since he is feeling so well and is looking to return to activities soon.    The patient will see Dr. Scott at six weeks post op. Jarod has our clinic number and will call with any questions or concerns.    Nima Metzger PA-C  Orthopaedic Surgery         Answers for HPI/ROS submitted by the patient on 11/9/2018   General Symptoms: Yes  Skin Symptoms: Yes  HENT Symptoms: No  EYE SYMPTOMS: No  HEART SYMPTOMS: No  LUNG SYMPTOMS: No  INTESTINAL SYMPTOMS: No  URINARY SYMPTOMS: No  REPRODUCTIVE SYMPTOMS: No  SKELETAL SYMPTOMS: No  BLOOD SYMPTOMS: No  NERVOUS SYSTEM SYMPTOMS: No  MENTAL HEALTH SYMPTOMS: No  Fever: No  Loss of appetite: No  Weight loss: No  Weight gain: No  Fatigue: No  Night sweats: No  Chills: No  Increased stress: No  Excessive hunger: No  Excessive thirst: No  Feeling hot or cold when others believe the temperature is normal: No  Loss of height: No  Post-operative complications: No  Surgical site pain: Yes  Hallucinations: No  Change in or Loss of Energy: No  Hyperactivity: No  Confusion: No  Changes in hair: No  Changes in moles/birth marks: No  Itching: Yes  Rashes: Yes  Changes in nails: No  Acne: No  Change in facial hair: No  Warts: No  Non-healing sores: No  Scarring: No  Flaking of skin: No  Color changes of hands/feet in cold : No  Sun sensitivity: No  Skin thickening: No      Again, thank you for allowing me to participate in the care of your patient.      Sincerely,    Nima Metzger PA-C

## 2018-11-16 NOTE — NURSING NOTE
Reason For Visit:   Chief Complaint   Patient presents with     Surgical Followup     2 week pop right ROBERTA with Dr. Scott.  DOS: 10/30/2018       There were no vitals taken for this visit.    Pain Assessment  Patient Currently in Pain: Denies (just some discomfort )    Jess Solorzano ATC

## 2018-11-16 NOTE — LETTER
Date:November 19, 2018      Patient was self referred, no letter generated. Do not send.        [facility] Information

## 2018-11-20 DIAGNOSIS — Z96.641 STATUS POST HIP REPLACEMENT, RIGHT: Primary | ICD-10-CM

## 2018-11-27 DIAGNOSIS — Z94.4 LIVER TRANSPLANTED (H): ICD-10-CM

## 2018-11-27 DIAGNOSIS — Z94.4 LIVER REPLACED BY TRANSPLANT (H): Primary | ICD-10-CM

## 2018-11-27 RX ORDER — SIROLIMUS 1 MG/1
1 TABLET, FILM COATED ORAL DAILY
Qty: 90 TABLET | Refills: 3 | Status: SHIPPED | OUTPATIENT
Start: 2018-11-27 | End: 2020-01-14

## 2018-11-27 NOTE — TELEPHONE ENCOUNTER
Message from Jarod stating that his hip wound is healed.  He was switched from RAPA to CSA just prior to surgery and is ready to return to his previous regimen.  Will ask him to start Rapa 1 mg daily, at the same time decrease CSA to 50 mg bid for 3 days, then 25 mg bid for 3 days, then stop, get RAPA level 3 days after starting.  All labs weekly x 4.  Send scripts to [facility].  Jarod was given instructions and voices understanding.

## 2018-11-30 DIAGNOSIS — Z94.4 LIVER REPLACED BY TRANSPLANT (H): ICD-10-CM

## 2018-11-30 LAB
ALBUMIN SERPL-MCNC: 3.9 G/DL (ref 3.4–5)
ALP SERPL-CCNC: 85 U/L (ref 40–150)
ALT SERPL W P-5'-P-CCNC: 35 U/L (ref 0–70)
ANION GAP SERPL CALCULATED.3IONS-SCNC: 7 MMOL/L (ref 3–14)
AST SERPL W P-5'-P-CCNC: 17 U/L (ref 0–45)
BILIRUB DIRECT SERPL-MCNC: 0.4 MG/DL (ref 0–0.2)
BILIRUB SERPL-MCNC: 1.4 MG/DL (ref 0.2–1.3)
BUN SERPL-MCNC: 22 MG/DL (ref 7–30)
CALCIUM SERPL-MCNC: 8.8 MG/DL (ref 8.5–10.1)
CHLORIDE SERPL-SCNC: 106 MMOL/L (ref 94–109)
CO2 SERPL-SCNC: 26 MMOL/L (ref 20–32)
CREAT SERPL-MCNC: 1.11 MG/DL (ref 0.66–1.25)
ERYTHROCYTE [DISTWIDTH] IN BLOOD BY AUTOMATED COUNT: 14.6 % (ref 10–15)
GFR SERPL CREATININE-BSD FRML MDRD: 71 ML/MIN/1.7M2
GLUCOSE SERPL-MCNC: 91 MG/DL (ref 70–99)
HCT VFR BLD AUTO: 47.3 % (ref 40–53)
HGB BLD-MCNC: 15.1 G/DL (ref 13.3–17.7)
MCH RBC QN AUTO: 27.9 PG (ref 26.5–33)
MCHC RBC AUTO-ENTMCNC: 31.9 G/DL (ref 31.5–36.5)
MCV RBC AUTO: 87 FL (ref 78–100)
PLATELET # BLD AUTO: 183 10E9/L (ref 150–450)
POTASSIUM SERPL-SCNC: 4.3 MMOL/L (ref 3.4–5.3)
PROT SERPL-MCNC: 7.4 G/DL (ref 6.8–8.8)
RBC # BLD AUTO: 5.42 10E12/L (ref 4.4–5.9)
SIROLIMUS BLD-MCNC: 3.2 UG/L (ref 5–15)
SODIUM SERPL-SCNC: 139 MMOL/L (ref 133–144)
TME LAST DOSE: ABNORMAL H
WBC # BLD AUTO: 5.3 10E9/L (ref 4–11)

## 2018-12-05 ENCOUNTER — MEDICAL CORRESPONDENCE (OUTPATIENT)
Dept: HEALTH INFORMATION MANAGEMENT | Facility: CLINIC | Age: 48
End: 2018-12-05

## 2018-12-05 ASSESSMENT — HOOS S4: HOW SEVERE IS YOUR HIP JOINT STIFFNESS AFTER FIRST WAKENING IN THE MORNING?: MILD

## 2018-12-05 ASSESSMENT — ACTIVITIES OF DAILY LIVING (ADL)
ADL_SUM: 3
ADL_SUBSCALE_SCORE: 95.58
ADL_MEAN: .17

## 2018-12-07 DIAGNOSIS — Z94.4 LIVER REPLACED BY TRANSPLANT (H): ICD-10-CM

## 2018-12-07 LAB
ALBUMIN SERPL-MCNC: 3.5 G/DL (ref 3.4–5)
ALP SERPL-CCNC: 75 U/L (ref 40–150)
ALT SERPL W P-5'-P-CCNC: 27 U/L (ref 0–70)
ANION GAP SERPL CALCULATED.3IONS-SCNC: 7 MMOL/L (ref 3–14)
AST SERPL W P-5'-P-CCNC: 15 U/L (ref 0–45)
BILIRUB DIRECT SERPL-MCNC: 0.2 MG/DL (ref 0–0.2)
BILIRUB SERPL-MCNC: 0.9 MG/DL (ref 0.2–1.3)
BUN SERPL-MCNC: 20 MG/DL (ref 7–30)
CALCIUM SERPL-MCNC: 8.8 MG/DL (ref 8.5–10.1)
CHLORIDE SERPL-SCNC: 108 MMOL/L (ref 94–109)
CO2 SERPL-SCNC: 26 MMOL/L (ref 20–32)
CREAT SERPL-MCNC: 1.26 MG/DL (ref 0.66–1.25)
ERYTHROCYTE [DISTWIDTH] IN BLOOD BY AUTOMATED COUNT: 14.2 % (ref 10–15)
GFR SERPL CREATININE-BSD FRML MDRD: 61 ML/MIN/1.7M2
GLUCOSE SERPL-MCNC: 87 MG/DL (ref 70–99)
HCT VFR BLD AUTO: 44.5 % (ref 40–53)
HGB BLD-MCNC: 14.3 G/DL (ref 13.3–17.7)
MCH RBC QN AUTO: 27.2 PG (ref 26.5–33)
MCHC RBC AUTO-ENTMCNC: 32.1 G/DL (ref 31.5–36.5)
MCV RBC AUTO: 85 FL (ref 78–100)
PLATELET # BLD AUTO: 212 10E9/L (ref 150–450)
POTASSIUM SERPL-SCNC: 4.1 MMOL/L (ref 3.4–5.3)
PROT SERPL-MCNC: 6.9 G/DL (ref 6.8–8.8)
RBC # BLD AUTO: 5.25 10E12/L (ref 4.4–5.9)
SIROLIMUS BLD-MCNC: 3.8 UG/L (ref 5–15)
SODIUM SERPL-SCNC: 141 MMOL/L (ref 133–144)
TME LAST DOSE: ABNORMAL H
WBC # BLD AUTO: 5.9 10E9/L (ref 4–11)

## 2018-12-12 ENCOUNTER — ANCILLARY PROCEDURE (OUTPATIENT)
Dept: GENERAL RADIOLOGY | Facility: CLINIC | Age: 48
End: 2018-12-12
Attending: ORTHOPAEDIC SURGERY
Payer: COMMERCIAL

## 2018-12-12 ENCOUNTER — OFFICE VISIT (OUTPATIENT)
Dept: ORTHOPEDICS | Facility: CLINIC | Age: 48
End: 2018-12-12
Payer: COMMERCIAL

## 2018-12-12 DIAGNOSIS — Z96.641 STATUS POST HIP REPLACEMENT, RIGHT: Primary | ICD-10-CM

## 2018-12-12 DIAGNOSIS — Z96.641 STATUS POST HIP REPLACEMENT, RIGHT: ICD-10-CM

## 2018-12-12 NOTE — LETTER
12/12/2018       RE: Jarod Quesada  [address] [place] [address]     Dear Colleague,    Thank you for referring your patient, Jarod Quesada, to the [facility] at [facility]. Please see a copy of my visit note below.           Interval History:   Jarod Quesada is a 48 year old male who is here follow up for:   Right ROBERTA - 10/30/2018    Jarod has been making steady progress following the hip replacement.  He notes that he developed a severe rash a few days after his surgery.  Fortunately, the rash has been steadily improving.  Use of topical lotion which greatly helped with the symptoms.  He did not have any rash around his incision or any incisional healing problems.  Rash appeared like a contact dermatitis from the prep or the drapes.  Otherwise he has been progressing with mobilization.  He is not taking any pain medication.  He has weaned off the use of a cane and assistive devices.  All in all he is very happy with the outcome related to his hips.         Physical Exam:     NAD  AOx3  Interactive and cooperative with the exam.  He has no pain with right hip range of motion.  He walks with a well-balanced gait.  His incision is well-healed.  There is no residual rash.         Imaging:      X-rays demonstrate well fixed well-positioned right cementless total hip replacement.       Assessment and Plan:      Jarod has done very well following the right hip replacement.  The rash was likely a contact dermatitis from either the prep or the drapes.  Fortunately, it has improved.  He may continue to progress with activities as tolerated.  I will see him back in clinic for a one-year postoperative visit.  We reviewed concerning findings.  We will let us know if he has any concerns or new problems related to his right hip in the meantime.    Nima Scott M.D.     Arthritis and Joint Replacement  Department of  Orthopaedic Surgery, [facility]  [email or web address]  [phone] (pager)

## 2018-12-12 NOTE — PROGRESS NOTES
Interval History:   Jarod Quesada is a 48 year old male who is here follow up for:   Right ROBERTA - 10/30/2018    Jarod has been making steady progress following the hip replacement.  He notes that he developed a severe rash a few days after his surgery.  Fortunately, the rash has been steadily improving.  Use of topical lotion which greatly helped with the symptoms.  He did not have any rash around his incision or any incisional healing problems.  Rash appeared like a contact dermatitis from the prep or the drapes.  Otherwise he has been progressing with mobilization.  He is not taking any pain medication.  He has weaned off the use of a cane and assistive devices.  All in all he is very happy with the outcome related to his hips.         Physical Exam:     NAD  AOx3  Interactive and cooperative with the exam.  He has no pain with right hip range of motion.  He walks with a well-balanced gait.  His incision is well-healed.  There is no residual rash.         Imaging:      X-rays demonstrate well fixed well-positioned right cementless total hip replacement.       Assessment and Plan:      Jarod has done very well following the right hip replacement.  The rash was likely a contact dermatitis from either the prep or the drapes.  Fortunately, it has improved.  He may continue to progress with activities as tolerated.  I will see him back in clinic for a one-year postoperative visit.  We reviewed concerning findings.  We will let us know if he has any concerns or new problems related to his right hip in the meantime.    Nima Scott M.D.     Arthritis and Joint Replacement  Department of Orthopaedic Surgery, [facility]  [email or web address]  [phone] (pager)

## 2018-12-12 NOTE — NURSING NOTE
Reason For Visit:   Chief Complaint   Patient presents with     Surgical Followup     DOS 10/30/18 S/P Right ROBERTA       Primary MD: Carrington Gil      Pain Assessment  Patient Currently in Pain: Yes  0-10 Pain Scale: 1  Primary Pain Location: Hip  Pain Orientation: Right  Pain Descriptors: Discomfort    Current Outpatient Medications   Medication     amLODIPine (NORVASC) 5 MG tablet     CHOLECALCIFEROL PO     cycloSPORINE modified (GENERIC EQUIVALENT) 25 MG capsule     gabapentin (NEURONTIN) 100 MG capsule     HYDROmorphone (DILAUDID) 2 MG tablet     metoprolol (LOPRESSOR) 50 MG tablet     mometasone (ELOCON) 0.1 % ointment     multivitamin, therapeutic with minerals (MULTI-VITAMIN) TABS tablet     mycophenolate (GENERIC EQUIVALENT) 500 MG tablet     order for DME     senna-docusate (SENOKOT-S;PERICOLACE) 8.6-50 MG per tablet     sildenafil (VIAGRA) 50 MG tablet     sirolimus (GENERIC EQUIVALENT) 1 MG tablet     tamsulosin (FLOMAX) 0.4 MG capsule     No current facility-administered medications for this visit.           Allergies   Allergen Reactions     Paxil [Paroxetine] Other (See Comments)     Caused Severe Tremor     Cepacol Maximum Strength Rash     Chlorhexidine Rash     Phenol Rash           Niki Maradiaga LPN

## 2018-12-14 DIAGNOSIS — Z94.4 LIVER REPLACED BY TRANSPLANT (H): ICD-10-CM

## 2018-12-14 LAB
ALBUMIN SERPL-MCNC: 3.5 G/DL (ref 3.4–5)
ALP SERPL-CCNC: 67 U/L (ref 40–150)
ALT SERPL W P-5'-P-CCNC: 26 U/L (ref 0–70)
ANION GAP SERPL CALCULATED.3IONS-SCNC: 6 MMOL/L (ref 3–14)
AST SERPL W P-5'-P-CCNC: 17 U/L (ref 0–45)
BILIRUB DIRECT SERPL-MCNC: 0.2 MG/DL (ref 0–0.2)
BILIRUB SERPL-MCNC: 1.1 MG/DL (ref 0.2–1.3)
BUN SERPL-MCNC: 16 MG/DL (ref 7–30)
CALCIUM SERPL-MCNC: 9.1 MG/DL (ref 8.5–10.1)
CHLORIDE SERPL-SCNC: 106 MMOL/L (ref 94–109)
CO2 SERPL-SCNC: 28 MMOL/L (ref 20–32)
CREAT SERPL-MCNC: 1.2 MG/DL (ref 0.66–1.25)
ERYTHROCYTE [DISTWIDTH] IN BLOOD BY AUTOMATED COUNT: 13.6 % (ref 10–15)
GFR SERPL CREATININE-BSD FRML MDRD: 64 ML/MIN/1.7M2
GLUCOSE SERPL-MCNC: 94 MG/DL (ref 70–99)
HCT VFR BLD AUTO: 44.1 % (ref 40–53)
HGB BLD-MCNC: 14.3 G/DL (ref 13.3–17.7)
MCH RBC QN AUTO: 27.6 PG (ref 26.5–33)
MCHC RBC AUTO-ENTMCNC: 32.4 G/DL (ref 31.5–36.5)
MCV RBC AUTO: 85 FL (ref 78–100)
PLATELET # BLD AUTO: 244 10E9/L (ref 150–450)
POTASSIUM SERPL-SCNC: 4.2 MMOL/L (ref 3.4–5.3)
PROT SERPL-MCNC: 6.6 G/DL (ref 6.8–8.8)
RBC # BLD AUTO: 5.19 10E12/L (ref 4.4–5.9)
SIROLIMUS BLD-MCNC: 4.4 UG/L (ref 5–15)
SODIUM SERPL-SCNC: 140 MMOL/L (ref 133–144)
TME LAST DOSE: ABNORMAL H
WBC # BLD AUTO: 4.3 10E9/L (ref 4–11)

## 2018-12-18 ENCOUNTER — DOCUMENTATION ONLY (OUTPATIENT)
Dept: TRANSPLANT | Facility: CLINIC | Age: 48
End: 2018-12-18

## 2018-12-21 ENCOUNTER — TELEPHONE (OUTPATIENT)
Dept: INFUSION THERAPY | Facility: CLINIC | Age: 48
End: 2018-12-21

## 2018-12-21 DIAGNOSIS — Z94.4 LIVER REPLACED BY TRANSPLANT (H): ICD-10-CM

## 2018-12-21 LAB
ALBUMIN SERPL-MCNC: 3.8 G/DL (ref 3.4–5)
ALP SERPL-CCNC: 68 U/L (ref 40–150)
ALT SERPL W P-5'-P-CCNC: 30 U/L (ref 0–70)
ANION GAP SERPL CALCULATED.3IONS-SCNC: 6 MMOL/L (ref 3–14)
AST SERPL W P-5'-P-CCNC: 17 U/L (ref 0–45)
BILIRUB DIRECT SERPL-MCNC: 0.2 MG/DL (ref 0–0.2)
BILIRUB SERPL-MCNC: 1 MG/DL (ref 0.2–1.3)
BUN SERPL-MCNC: 24 MG/DL (ref 7–30)
CALCIUM SERPL-MCNC: 8.7 MG/DL (ref 8.5–10.1)
CHLORIDE SERPL-SCNC: 105 MMOL/L (ref 94–109)
CO2 SERPL-SCNC: 28 MMOL/L (ref 20–32)
CREAT SERPL-MCNC: 1.26 MG/DL (ref 0.66–1.25)
ERYTHROCYTE [DISTWIDTH] IN BLOOD BY AUTOMATED COUNT: 13.2 % (ref 10–15)
GFR SERPL CREATININE-BSD FRML MDRD: 67 ML/MIN/{1.73_M2}
GLUCOSE SERPL-MCNC: 90 MG/DL (ref 70–99)
HCT VFR BLD AUTO: 46 % (ref 40–53)
HGB BLD-MCNC: 14.9 G/DL (ref 13.3–17.7)
MCH RBC QN AUTO: 27.7 PG (ref 26.5–33)
MCHC RBC AUTO-ENTMCNC: 32.4 G/DL (ref 31.5–36.5)
MCV RBC AUTO: 86 FL (ref 78–100)
PLATELET # BLD AUTO: 234 10E9/L (ref 150–450)
POTASSIUM SERPL-SCNC: 4.4 MMOL/L (ref 3.4–5.3)
PROT SERPL-MCNC: 7 G/DL (ref 6.8–8.8)
RBC # BLD AUTO: 5.38 10E12/L (ref 4.4–5.9)
SIROLIMUS BLD-MCNC: 4.1 UG/L (ref 5–15)
SODIUM SERPL-SCNC: 139 MMOL/L (ref 133–144)
TME LAST DOSE: ABNORMAL H
WBC # BLD AUTO: 5 10E9/L (ref 4–11)

## 2018-12-21 NOTE — TELEPHONE ENCOUNTER
Spoke with.pt about his Rapa level of 4.1. Since he is still taking Cellcept 1000 mg bid he would like to wit to see what his Rapa level is next week before adjusting his dose further. He was taking this dose ( 1mg bid) before his surgery and his levels were stable between 5-7so he is hoping that they will end up at that level again.

## 2019-01-02 ENCOUNTER — MEDICAL CORRESPONDENCE (OUTPATIENT)
Dept: HEALTH INFORMATION MANAGEMENT | Facility: CLINIC | Age: 49
End: 2019-01-02

## 2019-01-07 ENCOUNTER — TELEPHONE (OUTPATIENT)
Dept: TRANSPLANT | Facility: CLINIC | Age: 49
End: 2019-01-07

## 2019-01-07 DIAGNOSIS — Z94.4 LIVER REPLACED BY TRANSPLANT (H): Primary | ICD-10-CM

## 2019-01-07 LAB
ALBUMIN SERPL-MCNC: 3.6 G/DL (ref 3.4–5)
ALP SERPL-CCNC: 69 U/L (ref 40–150)
ALT SERPL W P-5'-P-CCNC: 31 U/L (ref 0–70)
ANION GAP SERPL CALCULATED.3IONS-SCNC: 6 MMOL/L (ref 3–14)
AST SERPL W P-5'-P-CCNC: 17 U/L (ref 0–45)
BILIRUB DIRECT SERPL-MCNC: 0.2 MG/DL (ref 0–0.2)
BILIRUB SERPL-MCNC: 0.7 MG/DL (ref 0.2–1.3)
BUN SERPL-MCNC: 17 MG/DL (ref 7–30)
CALCIUM SERPL-MCNC: 8.8 MG/DL (ref 8.5–10.1)
CHLORIDE SERPL-SCNC: 106 MMOL/L (ref 94–109)
CO2 SERPL-SCNC: 25 MMOL/L (ref 20–32)
CREAT SERPL-MCNC: 1.21 MG/DL (ref 0.66–1.25)
CREAT UR-MCNC: 130 MG/DL
ERYTHROCYTE [DISTWIDTH] IN BLOOD BY AUTOMATED COUNT: 12.7 % (ref 10–15)
GFR SERPL CREATININE-BSD FRML MDRD: 70 ML/MIN/{1.73_M2}
GLUCOSE SERPL-MCNC: 90 MG/DL (ref 70–99)
HCT VFR BLD AUTO: 46.4 % (ref 40–53)
HGB BLD-MCNC: 15.3 G/DL (ref 13.3–17.7)
MCH RBC QN AUTO: 27.7 PG (ref 26.5–33)
MCHC RBC AUTO-ENTMCNC: 33 G/DL (ref 31.5–36.5)
MCV RBC AUTO: 84 FL (ref 78–100)
PLATELET # BLD AUTO: 187 10E9/L (ref 150–450)
POTASSIUM SERPL-SCNC: 4.3 MMOL/L (ref 3.4–5.3)
PROT SERPL-MCNC: 6.9 G/DL (ref 6.8–8.8)
PROT UR-MCNC: 0.19 G/L
PROT/CREAT 24H UR: 0.15 G/G CR (ref 0–0.2)
RBC # BLD AUTO: 5.53 10E12/L (ref 4.4–5.9)
SIROLIMUS BLD-MCNC: 4.4 UG/L (ref 5–15)
SODIUM SERPL-SCNC: 136 MMOL/L (ref 133–144)
TME LAST DOSE: 800 H
WBC # BLD AUTO: 4.8 10E9/L (ref 4–11)

## 2019-01-07 NOTE — TELEPHONE ENCOUNTER
Provider Call: Transplant Lab/Orders  Route to LPN  Post Transplant Days: 2184 (Kidney), 2184 (Liver)  When patient is less than 60 days post-transplant, route high priority  Reason for Call: Missing labs/orders; which labs/orders? Pt dropped off a urine specimen today  No orders  in EPIC ([facility])  Liver patients reporting abnormal lab results: Route to RN and Page  Document lab facility information when provider is calling about annual lab orders. Delete facility wildcards when not needed.  Facility Name: [facility] lab  Facility Location: clinic  Outside Facility Fax Number: in [facility]  Callback needed? No

## 2019-01-07 NOTE — TELEPHONE ENCOUNTER
Lab calling again- stated pt dropped off urine today for Dr Emerson wanted a urine test done  Needs orders into wePIC- Call [facility] when done- at [phone]

## 2019-01-08 ENCOUNTER — OFFICE VISIT (OUTPATIENT)
Dept: NEPHROLOGY | Facility: CLINIC | Age: 49
End: 2019-01-08
Attending: INTERNAL MEDICINE
Payer: COMMERCIAL

## 2019-01-08 VITALS
HEIGHT: 72 IN | TEMPERATURE: 98.2 F | WEIGHT: 264 LBS | SYSTOLIC BLOOD PRESSURE: 130 MMHG | OXYGEN SATURATION: 98 % | DIASTOLIC BLOOD PRESSURE: 78 MMHG | HEART RATE: 72 BPM | BODY MASS INDEX: 35.76 KG/M2

## 2019-01-08 DIAGNOSIS — Z96.641 STATUS POST HIP REPLACEMENT, RIGHT: ICD-10-CM

## 2019-01-08 DIAGNOSIS — Z94.0 KIDNEY REPLACED BY TRANSPLANT: ICD-10-CM

## 2019-01-08 DIAGNOSIS — Z48.298 AFTERCARE FOLLOWING ORGAN TRANSPLANT: Primary | ICD-10-CM

## 2019-01-08 DIAGNOSIS — Z94.9 TRANSPLANT: ICD-10-CM

## 2019-01-08 DIAGNOSIS — Z94.4 LIVER REPLACED BY TRANSPLANT (H): ICD-10-CM

## 2019-01-08 DIAGNOSIS — D84.9 IMMUNOSUPPRESSED STATUS (H): ICD-10-CM

## 2019-01-08 DIAGNOSIS — I15.1 HYPERTENSION SECONDARY TO OTHER RENAL DISORDERS: ICD-10-CM

## 2019-01-08 PROCEDURE — G0463 HOSPITAL OUTPT CLINIC VISIT: HCPCS | Mod: ZF

## 2019-01-08 RX ORDER — MYCOPHENOLATE MOFETIL 250 MG/1
750 CAPSULE ORAL 2 TIMES DAILY
Qty: 540 CAPSULE | Refills: 3 | Status: SHIPPED | OUTPATIENT
Start: 2019-01-08 | End: 2020-02-28

## 2019-01-08 ASSESSMENT — PAIN SCALES - GENERAL: PAINLEVEL: NO PAIN (0)

## 2019-01-08 ASSESSMENT — MIFFLIN-ST. JEOR: SCORE: 2105.5

## 2019-01-08 NOTE — LETTER
2019      RE: Jarod Quesada  [address] [place] [address]           [facility]  Nephrology Clinic  Kidney/Pancreas Recipient  2019     Name: Jarod Quesada  MRN: [id number]   Age: 48 year old  : 1970  Referring provider: Carrington Gil     CHRONIC TRANSPLANT NEPHROLOGY VISIT    Assessment and Plan:   # LDKT and liver transplant:    - Baseline Cr ~ 1.2; Stable   - Proteinuria: Normal   - Date of DSA last checked: 10/10/17 Latest DSA: No   - BK Viremia: Not checked recently   - Kidney Tx Biopsy: No    # Immunosuppression: Mycophenolate mofetil (goal  1-3.5) and Sirolimus (goal  4-6)   - Changes: Yes - Mycophenolate will be reduced from 1000 mg to 750 mg.  The increase in MMF to 1000 was during use of csa and now that csa is off, mmf can be reduced.    # Hypertension: Controlled; Goal BP: < 130/80   - Changes: No    # Anemia in chronic renal disease: Hgb: Stable   - Iron studies: Not checked recently    # Mineral Bone Disorder:    - Secondary renal hyperparathyroidism; PTH level is: Not checked recently. Significantly elevated in  but no need to check  PTH levels today.   - Vitamin D; level is: Not checked recently   - Calcium; level is: Normal   - Phosphorus; level is: Not checked recently     # Electrolytes:   - Potassium; level: Normal  - Magnesium; level: Normal  - Bicarbonate; level: Not checked recently    # Nocturia: resolved    # Hyperlipidemia: discussed physical activity.    # Skin Cancer:   - New lesions: none   - Discussed sun protection and recommend regular follow up with Dermatology    # Medical Compliance: Yes    Follow-up: Data Unavailable     # Transplant History:  Etiology of kidney failure: Type I HRS   Tx: LDKT and liver transplant  Transplant: 2013 (Kidney), 2013 (Liver)   Donor Type:  - Brain Death Donor Class: Standard Criteria Donor  Significant changes in immunosuppression: Decrease mycophenoalte. See above.  Significant  transplant-related complications: None    Transplant Office Phone Number: [phone]    Assessment and plan was discussed with the patient and they voiced understanding and agreement.    Chief Complaint   The patient presents today for follow-up    History of Present Illness:  Jarod Quesada is a 48 year old male with history of end-stage liver disease and end-stage kidney disease presumed due to type I HRS on chronic hemodialysis, is status-post live-kidney transplant completed on 1/4/13.    The patient was last evaluated on 1/19/2018 by Dr. Emerson and was started on Flomax for nocturia.    Today, the patient is doing well. He had a hip replacement toward the end of October; his recent weight gain is attributed to lack of physical activity during recovery. Alcohol consumption caused his liver to fail, and his kidneys followed suit. Since transplantation he has not had complications, and his fistula was removed soon after.     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     Active Medications:     Current Outpatient Medications:      amLODIPine (NORVASC) 5 MG tablet, Take 5 mg by mouth daily, Disp: , Rfl:      CHOLECALCIFEROL PO, Take 1 tablet by mouth daily, Disp: , Rfl:      cycloSPORINE modified (GENERIC EQUIVALENT) 25 MG capsule, Take 3 capsules (75 mg) by mouth every 12 hours, Disp: 540 capsule, Rfl: 5     gabapentin  (NEURONTIN) 100 MG capsule, Take 6 capsules (600 mg) by mouth in the morning, take 5 capsules (500 mg) by mouth in the evening., Disp: , Rfl:      HYDROmorphone (DILAUDID) 2 MG tablet, Take 1-2 tablets (2-4 mg) by mouth every 3 hours as needed for moderate to severe pain (Patient not taking: Reported on 11/16/2018), Disp: 40 tablet, Rfl: 0     metoprolol (LOPRESSOR) 50 MG tablet, Take 75 mg by mouth in the morning and take 50 mg by mouth in the evening., Disp: , Rfl:      mometasone (ELOCON) 0.1 % ointment, Apply topically 2 times daily as needed, Disp: , Rfl:      multivitamin, therapeutic with minerals (MULTI-VITAMIN) TABS tablet, Take 1 tablet by mouth daily, Disp: , Rfl:      mycophenolate (GENERIC EQUIVALENT) 500 MG tablet, Take 2 tablets (1,000 mg) by mouth every 12 hours, Disp: 360 tablet, Rfl: 3     order for DME, Equipment being ordered: Treedomutches () Treatment Diagnosis: impaired gait, Disp: 1 each, Rfl: 0     senna-docusate (SENOKOT-S;PERICOLACE) 8.6-50 MG per tablet, Take 1 tablet by mouth 2 times daily (Patient not taking: Reported on 11/16/2018), Disp: 30 tablet, Rfl: 0     sildenafil (VIAGRA) 50 MG tablet, Take 0.5-1 tablets (25-50 mg) by mouth daily as needed for erectile dysfunction Take 30 min to 4 hours before intercourse.  Never use with nitroglycerin, terazosin or doxazosin., Disp: 12 tablet, Rfl: 11     sirolimus (GENERIC EQUIVALENT) 1 MG tablet, Take 1 tablet (1 mg) by mouth daily, Disp: 90 tablet, Rfl: 3     tamsulosin (FLOMAX) 0.4 MG capsule, Take 1 capsule (0.4 mg) by mouth daily (Patient not taking: Reported on 10/9/2018), Disp: 30 capsule, Rfl: 1      Allergies:   Paxil [paroxetine]  Cepacol maximum strength  Chlorhexidine  Phenol      Active Medical Problems:  Anemia in chronic renal disease  GERI  Kidney replaced by transplant  Liver replaced by transplant   High risk meds long-term use  Immunosuppressed  Hypertension  Secondary renal hyperparathyroidism  Status post hip replacement      Social History:   The patient does not smoke or use alcohol.    Physical Exam:   There were no vitals taken for this visit.   Wt Readings from Last 4 Encounters:   10/30/18 121.8 kg (268 lb 8.3 oz)   10/09/18 121.2 kg (267 lb 1.6 oz)   08/08/18 115.2 kg (254 lb)   01/26/18 111.3 kg (245 lb 6.4 oz)     GENERAL APPEARANCE: alert and no distress  HENT: mouth without ulcers or lesions  LYMPHATICS: no cervical or supraclavicular nodes  RESP: lungs clear to auscultation - no rales, rhonchi or wheezes  CV: regular rhythm, normal rate, no rub, no murmur  EDEMA: no LE edema bilaterally  ABDOMEN: soft, nondistended, nontender, bowel sounds normal  MS: extremities normal - no gross deformities noted, no evidence of inflammation in joints, no muscle tenderness  SKIN: no rash  TX KIDNEY: normal  DIALYSIS ACCESS:  None    Data:   Renal Latest Ref Rng & Units 1/7/2019 12/21/2018 12/14/2018   Na 133 - 144 mmol/L 136 139 140   Na (external) 135 - 145 mmol/L - - -   K 3.4 - 5.3 mmol/L 4.3 4.4 4.2   K (external) 3.5 - 5.0 mmol/L - - -   Cl 94 - 109 mmol/L 106 105 106   Cl (external) 98 - 110 mmol/L - - -   CO2 20 - 32 mmol/L 25 28 28   CO2 (external) 21 - 31 mmol/L - - -   BUN 7 - 30 mg/dL 17 24 16   BUN (external) 8 - 25 mg/dL - - -   Cr 0.66 - 1.25 mg/dL 1.21 1.26(H) 1.20   Cr (external) 0.72 - 1.25 mg/dL - - -   Glucose 70 - 99 mg/dL 90 90 94   Glucose (external) 65 - 100 mg/dL - - -   Ca  8.5 - 10.1 mg/dL 8.8 8.7 9.1   Ca (external) 8.5 - 10.5 mg/dL - - -   Mg 1.6 - 2.3 mg/dL - - -   Mg (external) 1.6 - 2.6 mg/dL - - -     Bone Health Latest Ref Rng & Units 7/24/2015 6/1/2015 1/22/2015   Phos 2.5 - 4.5 mg/dL 2.4(L) 3.1 1.9(L)   Phos (external) 2.3 - 4.7 mg/dL - - -   PTHi 12 - 72 pg/mL - - -     Heme Latest Ref Rng & Units 1/7/2019 12/21/2018 12/14/2018   WBC 4.0 - 11.0 10e9/L 4.8 5.0 4.3   WBC (external) 4.5 - 11.0 thou/cu mm - - -   Hgb 13.3 - 17.7 g/dL 15.3 14.9 14.3   Hgb (external) 13.5 - 17.5 g/dL - - -   Plt 150 - 450  10e9/L 187 234 244   Plt (external) 140 - 440 thou/cu mm - - -     Liver Latest Ref Rng & Units 1/7/2019 12/21/2018 12/14/2018   AP 40 - 150 U/L 69 68 67   AP (external) 50 - 136 U/L - - -   TBili 0.2 - 1.3 mg/dL 0.7 1.0 1.1   TBili (external) 0.2 - 1.2 mg/dL - - -   DBili 0.0 - 0.2 mg/dL 0.2 0.2 0.2   DBili (external) 0.1 - 0.5 mg/dL - - -   ALT 0 - 70 U/L 31 30 26   ALT (external) 8 - 45 U/L - - -   AST 0 - 45 U/L 17 17 17   AST (external) 2 - 40 U/L - - -   Tot Protein 6.8 - 8.8 g/dL 6.9 7.0 6.6(L)   Tot Protein (external) 6.0 - 8.0 g/dL - - -   Albumin 3.4 - 5.0 g/dL 3.6 3.8 3.5   Albumin (external) 3.5 - 5.2 g/dL - - -     Pancreas Latest Ref Rng & Units 1/15/2013 1/13/2013 1/6/2013   Amylase 30 - 110 U/L <30(L) 59 116(H)   Lipase 20 - 250 U/L 28 - 263(H)     Iron studies Latest Ref Rng & Units 1/8/2013 10/3/2012   Iron 35 - 180 ug/dL 15(L) 82   Iron sat 15 - 46 % 12(L) 70(H)   Ferritin 20 - 300 ng/mL 500(H) -     [facility] T[facility] Virology Latest Ref Rng & Units 2/3/2014 1/21/2014 1/30/2013   CVM DNA Quant - - - Whole blood, EDTA anticoagulant   CMV Quant <100 Copies/mL - - <100  No CMV DNA detected.   CMV QT Log <2.0 Log copies/mL - - <2.0  The Cytomegalovirus DNA Quantitation assay is a real-time polymerase chain   reaction (PCR) utilizing analyte specific reagents manufactured by [employer]   [facility]. Analyte Specific Reagents (ASRs) are used in many laboratory   tests necessary for standard medical care and generally do not require FDA   approval.   This test was developed and its performance characteristics determined by   [facility].  It has not been   cleared or approved by the [place] Food and Drug Administration.   BK Spec - - - Plasma, EDTA anticoagulant   BK Res <1000 copies/mL - - <1000   BK Log <3.0 Log copies/mL - - <3.0  The lower limit of detection for this assay is 1000 copies/mL.  Real-time TaqMan   PCR was performed using BK primers and probe for the detection  of a 90 bp   portion of the  1 gene.  The performance characteristics were validated by   the [facility], [place].   It has not been cleared or approved by the U.S. Food and Drug Administration.   Hep B Core NEG - Negative -   Hep B Surf - 1.2 - -   HIV 1&2 NEG - - -        Recent Labs   Lab Test 08/12/13  0745 08/19/13  0755 09/09/13  0745   DOSTAC 2000, 08/11/13 08/18/13 2000 NTP   TACROL 5.9 <3.0 Tacrolimus Reference Range  Kidney Transplant  Pediatric                      ug/L    0-3 months post transplant   10-12    3-6 months post transplant   8-10    6-12 months post transplant  6-8    >12 months post transplant   4-7  Adult    0-6 months post transplant   8-10    6-12 months post transplant  6-8    >12 months post transplant   4-6    >5 years post transplant     3-5  Heart Transplant  Pediatric    0-12 months post transplant  10-15    >12 months post transplant   5-10  Adult    0-3 months post transplant   10-15    3-6 months post transplant   8-12    6-12 months post transplant  6-12    >12 months post transplant   6-10  Lung Transplant    0-12 months post transplant  10-15    >12 months post transplant   8-12  Liver Transplant  Pediatric    0-3 months post transplant   10-15    3-6 months post transplant   8-10    >6 months post transplant    6-8  Adult    0-3 months post transplant   10-12    3-6 months post transplant   8-10    >6 months post transplant    6-8  Pancreas Transplant    0-6 months post transplant   8-10    >6 months post transplant    5-8* <3.0 Tacrolimus Reference Range  Kidney Transplant  Pediatric                      ug/L    0-3 months post transplant   10-12    3-6 months post transplant   8-10    6-12 months post transplant  6-8    >12 months post transplant   4-7  Adult    0-6 months post transplant   8-10    6-12 months post transplant  6-8    >12 months post transplant   4-6    >5 years post transplant     3-5  Heart Transplant  Pediatric    0-12  months post transplant  10-15    >12 months post transplant   5-10  Adult    0-3 months post transplant   10-15    3-6 months post transplant   8-12    6-12 months post transplant  6-12    >12 months post transplant   6-10  Lung Transplant    0-12 months post transplant  10-15    >12 months post transplant   8-12  Liver Transplant  Pediatric    0-3 months post transplant   10-15    3-6 months post transplant   8-10    >6 months post transplant    6-8  Adult    0-3 months post transplant   10-12    3-6 months post transplant   8-10    >6 months post transplant    6-8  Pancreas Transplant    0-6 months post transplant   8-10    >6 months post transplant    5-8*     Recent Labs   Lab Test 05/15/17  0741 10/04/18  0820 10/15/18  0748   DOSMPA 8p 5.14.2017 Not Provided Not Provided   MPACID 1.20 0.97* 1.10   MPAG 51.8 70.7 76.6     Scribe Disclosure:   I, Alissa Mejia, am serving as a scribe to document services personally performed by Nemesio Ovalles MD at this visit, based upon the provider's statements to me. All documentation has been reviewed by the aforementioned provider prior to being entered into the official medical record.     Portions of this medical record were completed by a scribe. UPON MY REVIEW AND AUTHENTICATION BY ELECTRONIC SIGNATURE, this confirms (a) I performed the applicable clinical services, and (b) the record is accurate.     Kidney/Pancreas Recipient

## 2019-01-08 NOTE — NURSING NOTE
Chief Complaint   Patient presents with     RECHECK     Kidney tx      Vital signs:  Temp: 98.2  F (36.8  C) Temp src: Oral BP: 130/78 Pulse: 72     SpO2: 98 %     Height: 182.9 cm (6') Weight: 119.7 kg (264 lb)  Estimated body mass index is 35.8 kg/m  as calculated from the following:    Height as of this encounter: 1.829 m (6').    Weight as of this encounter: 119.7 kg (264 lb).        Makenna Reynolds, CMA

## 2019-01-08 NOTE — PROGRESS NOTES
[facility]  Nephrology Clinic  Kidney/Pancreas Recipient  2019     Name: Jarod Quesada  MRN: [id number]   Age: 48 year old  : 1970  Referring provider: Carrington Gil     CHRONIC TRANSPLANT NEPHROLOGY VISIT    Assessment and Plan:   # LDKT and liver transplant:    - Baseline Cr ~ 1.2; Stable   - Proteinuria: Normal   - Date of DSA last checked: 10/10/17 Latest DSA: No   - BK Viremia: Not checked recently   - Kidney Tx Biopsy: No    # Immunosuppression: Mycophenolate mofetil (goal  1-3.5) and Sirolimus (goal  4-6)   - Changes: Yes - Mycophenolate will be reduced from 1000 mg to 750 mg.  The increase in MMF to 1000 was during use of csa and now that csa is off, mmf can be reduced.    # Hypertension: Controlled; Goal BP: < 130/80   - Changes: No    # Anemia in chronic renal disease: Hgb: Stable   - Iron studies: Not checked recently    # Mineral Bone Disorder:    - Secondary renal hyperparathyroidism; PTH level is: Not checked recently. Significantly elevated in  but no need to check  PTH levels today.   - Vitamin D; level is: Not checked recently   - Calcium; level is: Normal   - Phosphorus; level is: Not checked recently     # Electrolytes:   - Potassium; level: Normal  - Magnesium; level: Normal  - Bicarbonate; level: Not checked recently    # Nocturia: resolved    # Hyperlipidemia: discussed physical activity.    # Skin Cancer:   - New lesions: none   - Discussed sun protection and recommend regular follow up with Dermatology    # Medical Compliance: Yes    Follow-up: Data Unavailable     # Transplant History:  Etiology of kidney failure: Type I HRS   Tx: LDKT and liver transplant  Transplant: 2013 (Kidney), 2013 (Liver)   Donor Type:  - Brain Death Donor Class: Standard Criteria Donor  Significant changes in immunosuppression: Decrease mycophenoalte. See above.  Significant transplant-related complications: None    Transplant Office Phone Number:  [phone]    Assessment and plan was discussed with the patient and they voiced understanding and agreement.    Chief Complaint   The patient presents today for follow-up    History of Present Illness:  Jarod Quesada is a 48 year old male with history of end-stage liver disease and end-stage kidney disease presumed due to type I HRS on chronic hemodialysis, is status-post live-kidney transplant completed on 1/4/13.    The patient was last evaluated on 1/19/2018 by Dr. Emerson and was started on Flomax for nocturia.    Today, the patient is doing well. He had a hip replacement toward the end of October; his recent weight gain is attributed to lack of physical activity during recovery. Alcohol consumption caused his liver to fail, and his kidneys followed suit. Since transplantation he has not had complications, and his fistula was removed soon after.     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     Active Medications:     Current Outpatient Medications:      amLODIPine (NORVASC) 5 MG tablet, Take 5 mg by mouth daily, Disp: , Rfl:      CHOLECALCIFEROL PO, Take 1 tablet by mouth daily, Disp: , Rfl:      cycloSPORINE modified (GENERIC EQUIVALENT) 25 MG capsule, Take 3 capsules (75 mg) by mouth every 12 hours, Disp: 540 capsule, Rfl: 5     gabapentin (NEURONTIN) 100 MG capsule, Take 6 capsules (600 mg) by mouth in the morning, take  5 capsules (500 mg) by mouth in the evening., Disp: , Rfl:      HYDROmorphone (DILAUDID) 2 MG tablet, Take 1-2 tablets (2-4 mg) by mouth every 3 hours as needed for moderate to severe pain (Patient not taking: Reported on 11/16/2018), Disp: 40 tablet, Rfl: 0     metoprolol (LOPRESSOR) 50 MG tablet, Take 75 mg by mouth in the morning and take 50 mg by mouth in the evening., Disp: , Rfl:      mometasone (ELOCON) 0.1 % ointment, Apply topically 2 times daily as needed, Disp: , Rfl:      multivitamin, therapeutic with minerals (MULTI-VITAMIN) TABS tablet, Take 1 tablet by mouth daily, Disp: , Rfl:      mycophenolate (GENERIC EQUIVALENT) 500 MG tablet, Take 2 tablets (1,000 mg) by mouth every 12 hours, Disp: 360 tablet, Rfl: 3     order for DME, Equipment being ordered: Crutches () Treatment Diagnosis: impaired gait, Disp: 1 each, Rfl: 0     senna-docusate (SENOKOT-S;PERICOLACE) 8.6-50 MG per tablet, Take 1 tablet by mouth 2 times daily (Patient not taking: Reported on 11/16/2018), Disp: 30 tablet, Rfl: 0     sildenafil (VIAGRA) 50 MG tablet, Take 0.5-1 tablets (25-50 mg) by mouth daily as needed for erectile dysfunction Take 30 min to 4 hours before intercourse.  Never use with nitroglycerin, terazosin or doxazosin., Disp: 12 tablet, Rfl: 11     sirolimus (GENERIC EQUIVALENT) 1 MG tablet, Take 1 tablet (1 mg) by mouth daily, Disp: 90 tablet, Rfl: 3     tamsulosin (FLOMAX) 0.4 MG capsule, Take 1 capsule (0.4 mg) by mouth daily (Patient not taking: Reported on 10/9/2018), Disp: 30 capsule, Rfl: 1      Allergies:   Paxil [paroxetine]  Cepacol maximum strength  Chlorhexidine  Phenol      Active Medical Problems:  Anemia in chronic renal disease  GERI  Kidney replaced by transplant  Liver replaced by transplant   High risk meds long-term use  Immunosuppressed  Hypertension  Secondary renal hyperparathyroidism  Status post hip replacement     Social History:   The patient does not smoke or use alcohol.    Physical Exam:    There were no vitals taken for this visit.   Wt Readings from Last 4 Encounters:   10/30/18 121.8 kg (268 lb 8.3 oz)   10/09/18 121.2 kg (267 lb 1.6 oz)   08/08/18 115.2 kg (254 lb)   01/26/18 111.3 kg (245 lb 6.4 oz)     GENERAL APPEARANCE: alert and no distress  HENT: mouth without ulcers or lesions  LYMPHATICS: no cervical or supraclavicular nodes  RESP: lungs clear to auscultation - no rales, rhonchi or wheezes  CV: regular rhythm, normal rate, no rub, no murmur  EDEMA: no LE edema bilaterally  ABDOMEN: soft, nondistended, nontender, bowel sounds normal  MS: extremities normal - no gross deformities noted, no evidence of inflammation in joints, no muscle tenderness  SKIN: no rash  TX KIDNEY: normal  DIALYSIS ACCESS:  None    Data:   Renal Latest Ref Rng & Units 1/7/2019 12/21/2018 12/14/2018   Na 133 - 144 mmol/L 136 139 140   Na (external) 135 - 145 mmol/L - - -   K 3.4 - 5.3 mmol/L 4.3 4.4 4.2   K (external) 3.5 - 5.0 mmol/L - - -   Cl 94 - 109 mmol/L 106 105 106   Cl (external) 98 - 110 mmol/L - - -   CO2 20 - 32 mmol/L 25 28 28   CO2 (external) 21 - 31 mmol/L - - -   BUN 7 - 30 mg/dL 17 24 16   BUN (external) 8 - 25 mg/dL - - -   Cr 0.66 - 1.25 mg/dL 1.21 1.26(H) 1.20   Cr (external) 0.72 - 1.25 mg/dL - - -   Glucose 70 - 99 mg/dL 90 90 94   Glucose (external) 65 - 100 mg/dL - - -   Ca  8.5 - 10.1 mg/dL 8.8 8.7 9.1   Ca (external) 8.5 - 10.5 mg/dL - - -   Mg 1.6 - 2.3 mg/dL - - -   Mg (external) 1.6 - 2.6 mg/dL - - -     Bone Health Latest Ref Rng & Units 7/24/2015 6/1/2015 1/22/2015   Phos 2.5 - 4.5 mg/dL 2.4(L) 3.1 1.9(L)   Phos (external) 2.3 - 4.7 mg/dL - - -   PTHi 12 - 72 pg/mL - - -     Heme Latest Ref Rng & Units 1/7/2019 12/21/2018 12/14/2018   WBC 4.0 - 11.0 10e9/L 4.8 5.0 4.3   WBC (external) 4.5 - 11.0 thou/cu mm - - -   Hgb 13.3 - 17.7 g/dL 15.3 14.9 14.3   Hgb (external) 13.5 - 17.5 g/dL - - -   Plt 150 - 450 10e9/L 187 234 244   Plt (external) 140 - 440 thou/cu mm - - -     Liver Latest Ref  Rng & Units 1/7/2019 12/21/2018 12/14/2018   AP 40 - 150 U/L 69 68 67   AP (external) 50 - 136 U/L - - -   TBili 0.2 - 1.3 mg/dL 0.7 1.0 1.1   TBili (external) 0.2 - 1.2 mg/dL - - -   DBili 0.0 - 0.2 mg/dL 0.2 0.2 0.2   DBili (external) 0.1 - 0.5 mg/dL - - -   ALT 0 - 70 U/L 31 30 26   ALT (external) 8 - 45 U/L - - -   AST 0 - 45 U/L 17 17 17   AST (external) 2 - 40 U/L - - -   Tot Protein 6.8 - 8.8 g/dL 6.9 7.0 6.6(L)   Tot Protein (external) 6.0 - 8.0 g/dL - - -   Albumin 3.4 - 5.0 g/dL 3.6 3.8 3.5   Albumin (external) 3.5 - 5.2 g/dL - - -     Pancreas Latest Ref Rng & Units 1/15/2013 1/13/2013 1/6/2013   Amylase 30 - 110 U/L <30(L) 59 116(H)   Lipase 20 - 250 U/L 28 - 263(H)     Iron studies Latest Ref Rng & Units 1/8/2013 10/3/2012   Iron 35 - 180 ug/dL 15(L) 82   Iron sat 15 - 46 % 12(L) 70(H)   Ferritin 20 - 300 ng/mL 500(H) -     [facility] Txp Virology Latest Ref Rng & Units 2/3/2014 1/21/2014 1/30/2013   CVM DNA Quant - - - Whole blood, EDTA anticoagulant   CMV Quant <100 Copies/mL - - <100  No CMV DNA detected.   CMV QT Log <2.0 Log copies/mL - - <2.0  The Cytomegalovirus DNA Quantitation assay is a real-time polymerase chain   reaction (PCR) utilizing analyte specific reagents manufactured by [employer]   [employer]. Analyte Specific Reagents (ASRs) are used in many laboratory   tests necessary for standard medical care and generally do not require FDA   approval.   This test was developed and its performance characteristics determined by   [facility].  It has not been   cleared or approved by the US Food and Drug Administration.   BK Spec - - - Plasma, EDTA anticoagulant   BK Res <1000 copies/mL - - <1000   BK Log <3.0 Log copies/mL - - <3.0  The lower limit of detection for this assay is 1000 copies/mL.  Real-time TaqMan   PCR was performed using BK primers and probe for the detection of a 90 bp   portion of the  1 gene.  The performance characteristics were  validated by   the [facility], [place].   It has not been cleared or approved by the U.S. Food and Drug Administration.   Hep B Core NEG - Negative -   Hep B Surf - 1.2 - -   HIV 1&2 NEG - - -        Recent Labs   Lab Test 08/12/13  0745 08/19/13  0755 09/09/13  0745   DOSTAC 2000, 08/11/13 08/18/13 2000 NTP   TACROL 5.9 <3.0 Tacrolimus Reference Range  Kidney Transplant  Pediatric                      ug/L    0-3 months post transplant   10-12    3-6 months post transplant   8-10    6-12 months post transplant  6-8    >12 months post transplant   4-7  Adult    0-6 months post transplant   8-10    6-12 months post transplant  6-8    >12 months post transplant   4-6    >5 years post transplant     3-5  Heart Transplant  Pediatric    0-12 months post transplant  10-15    >12 months post transplant   5-10  Adult    0-3 months post transplant   10-15    3-6 months post transplant   8-12    6-12 months post transplant  6-12    >12 months post transplant   6-10  Lung Transplant    0-12 months post transplant  10-15    >12 months post transplant   8-12  Liver Transplant  Pediatric    0-3 months post transplant   10-15    3-6 months post transplant   8-10    >6 months post transplant    6-8  Adult    0-3 months post transplant   10-12    3-6 months post transplant   8-10    >6 months post transplant    6-8  Pancreas Transplant    0-6 months post transplant   8-10    >6 months post transplant    5-8* <3.0 Tacrolimus Reference Range  Kidney Transplant  Pediatric                      ug/L    0-3 months post transplant   10-12    3-6 months post transplant   8-10    6-12 months post transplant  6-8    >12 months post transplant   4-7  Adult    0-6 months post transplant   8-10    6-12 months post transplant  6-8    >12 months post transplant   4-6    >5 years post transplant     3-5  Heart Transplant  Pediatric    0-12 months post transplant  10-15    >12 months post transplant   5-10  Adult    0-3  months post transplant   10-15    3-6 months post transplant   8-12    6-12 months post transplant  6-12    >12 months post transplant   6-10  Lung Transplant    0-12 months post transplant  10-15    >12 months post transplant   8-12  Liver Transplant  Pediatric    0-3 months post transplant   10-15    3-6 months post transplant   8-10    >6 months post transplant    6-8  Adult    0-3 months post transplant   10-12    3-6 months post transplant   8-10    >6 months post transplant    6-8  Pancreas Transplant    0-6 months post transplant   8-10    >6 months post transplant    5-8*     Recent Labs   Lab Test 05/15/17  0741 10/04/18  0820 10/15/18  0748   DOSMPA 8p 5.14.2017 Not Provided Not Provided   MPACID 1.20 0.97* 1.10   MPAG 51.8 70.7 76.6     Scribe Disclosure:   I, Alissa Mejia, am serving as a scribe to document services personally performed by Nemesio Ovalles MD at this visit, based upon the provider's statements to me. All documentation has been reviewed by the aforementioned provider prior to being entered into the official medical record.     Portions of this medical record were completed by a scribe. UPON MY REVIEW AND AUTHENTICATION BY ELECTRONIC SIGNATURE, this confirms (a) I performed the applicable clinical services, and (b) the record is accurate.

## 2019-01-09 NOTE — PROGRESS NOTES
[facility]  LIVER TRANSPLANT CLINIC     A/P  48 year old male s/p SLK 1/14/13 for ETOH.  Medically doing extremely well.    IS: Rapa and MMF. Will repeat rapa level next week.  No changes to medications today. Labs up to date and normal.    Discussed skin cancer screening: due for derm. Last was 4/17  Discussed ventral hernia and weight gain. Recommend weight loss.  Colonoscopy in 2022 at age 50.  Shingles shot today.  Had the flu shot.   Will see back in 1 year.   This was a 25 minute visit, over 50% counseling and coordination of care.      Jeannine Biggs MD  Hepatology/ Liver Transplant  [facility]  ==================================================================  [place] [facility] [place]   [place]phrology: Dr. Jalen Emerson     SUBJECTIVE  48 year old male s/p SLK 1/14/13 for ETOH.  EXPLANT: Cirrhosis, no HCC  IS: SRL and MMF  LABS: Up to date and normal liver tests  Recent Labs   Lab Test 01/07/19  0757   PROTTOTAL 6.9   ALBUMIN 3.6   BILITOTAL 0.7   ALKPHOS 69   AST 17   ALT 31     REJECTION: None.  BILIARY ISSUES: None  STENT: No stent on abdominal imaging post transpalnt  KIDNEY FUNCTION: Sees Dr. Emerson. Stable. No proteinuria  Creatinine   Date Value Ref Range Status   01/07/2019 1.21 0.66 - 1.25 mg/dL Final     BP: Controlled on amlodipine, metoprolol. Checks at home in the 120s/70s. Today a little high.  PREV: UTD on screening (colonoscopy 6/29/2012 no polyps, area of ulceration. Path showed nonspecific changes. Record is located in Media tab on 9/21/12)   Derm Feb 2017   DISEASE RECURRENCE: No alcohol  OTHER ISSUES: Struggles with weight gain. Also has neuropathy, on gabapentin. Hyperlipidemia  NEW ISSUES:  Had a R ROBERTA with Dr. Scott here in early November 2018.   Flu shot had it.  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  ROS: 10 point ROS neg other than the symptoms noted above in the HPI.     OBJECTIVE  /85   Pulse 50   Temp 97.8  F (36.6  C)  (Oral)   Ht 1.829 m (6')   Wt 121.5 kg (267 lb 12.8 oz)   BMI 36.32 kg/m      GENERAL:  Very pleasant, well-appearing, in no acute distress.    HEENT:  No icterus, no oral lesions.    LYMPH:  No supraclavicular or cervical lymphadenopathy.    CARDIOVASCULAR:  Regular rate and rhythm.    CHEST:  Lungs are clear.    ABDOMEN:  Bowel sounds are present.  Abdomen is soft, nontender, nondistended.  Med soft ventral hernia.    EXTREMITIES:  No edema.    SKIN:  No rash.    NEUROLOGIC:  Speech is fluent and clear.  No asterixis or tremor.

## 2019-01-11 ENCOUNTER — OFFICE VISIT (OUTPATIENT)
Dept: GASTROENTEROLOGY | Facility: CLINIC | Age: 49
End: 2019-01-11
Attending: INTERNAL MEDICINE
Payer: COMMERCIAL

## 2019-01-11 ENCOUNTER — TELEPHONE (OUTPATIENT)
Dept: TRANSPLANT | Facility: CLINIC | Age: 49
End: 2019-01-11

## 2019-01-11 ENCOUNTER — TELEPHONE (OUTPATIENT)
Dept: GASTROENTEROLOGY | Facility: CLINIC | Age: 49
End: 2019-01-11

## 2019-01-11 VITALS
SYSTOLIC BLOOD PRESSURE: 150 MMHG | DIASTOLIC BLOOD PRESSURE: 85 MMHG | HEART RATE: 50 BPM | WEIGHT: 267.8 LBS | TEMPERATURE: 97.8 F | HEIGHT: 72 IN | BODY MASS INDEX: 36.27 KG/M2

## 2019-01-11 DIAGNOSIS — Z23 ENCOUNTER FOR VACCINATION: Primary | ICD-10-CM

## 2019-01-11 PROCEDURE — G0463 HOSPITAL OUTPT CLINIC VISIT: HCPCS

## 2019-01-11 PROCEDURE — 90750 HZV VACC RECOMBINANT IM: CPT | Mod: ZF | Performed by: INTERNAL MEDICINE

## 2019-01-11 PROCEDURE — 90471 IMMUNIZATION ADMIN: CPT | Mod: ZF

## 2019-01-11 PROCEDURE — 25000581 ZZH RX MED A9270 GY (STAT IND- M) 250: Mod: ZF | Performed by: INTERNAL MEDICINE

## 2019-01-11 RX ADMIN — ZOSTER VACCINE RECOMBINANT, ADJUVANTED 0.5 ML: KIT at 13:01

## 2019-01-11 ASSESSMENT — MIFFLIN-ST. JEOR: SCORE: 2122.73

## 2019-01-11 ASSESSMENT — PAIN SCALES - GENERAL: PAINLEVEL: NO PAIN (0)

## 2019-01-11 NOTE — NURSING NOTE
/85   Pulse 50   Temp 97.8  F (36.6  C) (Oral)   Ht 1.829 m (6')   Wt 121.5 kg (267 lb 12.8 oz)   BMI 36.32 kg/m    Chief Complaint   Patient presents with     RECHECK     follow up with liver transplant, tzimmer cma

## 2019-01-11 NOTE — TELEPHONE ENCOUNTER
Here for post liver transplant follow-up.  Doing well post right hip replacement.  Has incisional hernia, cautioned about heavy lifting. Reviewed recent labs and assisted with interpretation.     Complaints / Concerns: none    Current immunosuppression:    RAPA / Cellcept    Med changes: none    Lab frequency:  q 2-3 mos    Follow-up:  Hepatology, derm - is overdue, will make appt.  PCP annually.

## 2019-01-11 NOTE — LETTER
1/11/2019      RE: Jarod Quesada  [address] [place] [address]       [facility]  LIVER TRANSPLANT CLINIC     A/P  48 year old male s/p SLK 1/14/13 for ETOH.  Medically doing extremely well.    IS: Rapa and MMF. Will repeat rapa level next week.  No changes to medications today. Labs up to date and normal.    Discussed skin cancer screening: due for derm. Last was 4/17  Discussed ventral hernia and weight gain. Recommend weight loss.  Colonoscopy in 2022 at age 50.  Shingles shot today.  Had the flu shot.   Will see back in 1 year.   This was a 25 minute visit, over 50% counseling and coordination of care.      Jeannine Biggs MD  Hepatology/ Liver Transplant  [facility]  ==================================================================  [place] [facility] [place]   Nephrology: Dr. Jalen Emerson     SUBJECTIVE  48 year old male s/p SLK 1/14/13 for ETOH.  EXPLANT: Cirrhosis, no HCC  IS: SRL and MMF  LABS: Up to date and normal liver tests  Recent Labs   Lab Test 01/07/19  0757   PROTTOTAL 6.9   ALBUMIN 3.6   BILITOTAL 0.7   ALKPHOS 69   AST 17   ALT 31     REJECTION: None.  BILIARY ISSUES: None  STENT: No stent on abdominal imaging post transpalnt  KIDNEY FUNCTION: Sees Dr. Emerson. Stable. No proteinuria  Creatinine   Date Value Ref Range Status   01/07/2019 1.21 0.66 - 1.25 mg/dL Final     BP: Controlled on amlodipine, metoprolol. Checks at home in the 120s/70s. Today a little high.  PREV: UTD on screening (colonoscopy 6/29/2012 no polyps, area of ulceration. Path showed nonspecific changes. Record is located in Media tab on 9/21/12)   Derm Feb 2017   DISEASE RECURRENCE: No alcohol  OTHER ISSUES: Struggles with weight gain. Also has neuropathy, on gabapentin. Hyperlipidemia  NEW ISSUES:  Had a R ROBERTA with Dr. Scott here in early November 2018.   Flu shot had it.  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  ROS: 10 point ROS neg other than the symptoms noted  above in the HPI.     OBJECTIVE  /85   Pulse 50   Temp 97.8  F (36.6  C) (Oral)   Ht 1.829 m (6')   Wt 121.5 kg (267 lb 12.8 oz)   BMI 36.32 kg/m       GENERAL:  Very pleasant, well-appearing, in no acute distress.    HEENT:  No icterus, no oral lesions.    LYMPH:  No supraclavicular or cervical lymphadenopathy.    CARDIOVASCULAR:  Regular rate and rhythm.    CHEST:  Lungs are clear.    ABDOMEN:  Bowel sounds are present.  Abdomen is soft, nontender, nondistended.  Med soft ventral hernia.    EXTREMITIES:  No edema.    SKIN:  No rash.    NEUROLOGIC:  Speech is fluent and clear.  No asterixis or tremor.      Jeannine Biggs MD

## 2019-01-11 NOTE — TELEPHONE ENCOUNTER
Here for post liver transplant follow-up.  Doing well. Reviewed recent labs and assisted with interpretation.     Complaints / Concerns: none    Current immunosuppression:    cellcept / RAPA    Med changes: none    Lab frequency:  q 2-3 mos    Follow-up:  Hepatology, derm and PCP annually.  Reviewed my contact information, now to reach the transplant office during weekday and afterhours.

## 2019-03-11 NOTE — TELEPHONE ENCOUNTER
Provider Call: Medication Clarification  Pharmacy Name: [facility]  Pharmacy Location:   Name of Medication: Sirolimus-   Callback needed? Yes  Return Call Needed  Same as documented in contacts section  
Returned call to [facility]. They report they spoke with txp coordinator Catherine yesterday who provided the required clarification.  
11-Mar-2019 07:34

## 2019-05-09 DIAGNOSIS — Z94.4 LIVER REPLACED BY TRANSPLANT (H): ICD-10-CM

## 2019-05-09 LAB
ALBUMIN SERPL-MCNC: 3.6 G/DL (ref 3.4–5)
ALBUMIN UR-MCNC: NEGATIVE MG/DL
ALP SERPL-CCNC: 65 U/L (ref 40–150)
ALT SERPL W P-5'-P-CCNC: 25 U/L (ref 0–70)
ANION GAP SERPL CALCULATED.3IONS-SCNC: 7 MMOL/L (ref 3–14)
APPEARANCE UR: CLEAR
AST SERPL W P-5'-P-CCNC: 17 U/L (ref 0–45)
BILIRUB DIRECT SERPL-MCNC: 0.2 MG/DL (ref 0–0.2)
BILIRUB SERPL-MCNC: 0.9 MG/DL (ref 0.2–1.3)
BILIRUB UR QL STRIP: NEGATIVE
BUN SERPL-MCNC: 19 MG/DL (ref 7–30)
CALCIUM SERPL-MCNC: 9.1 MG/DL (ref 8.5–10.1)
CHLORIDE SERPL-SCNC: 103 MMOL/L (ref 94–109)
CHOLEST SERPL-MCNC: 172 MG/DL
CO2 SERPL-SCNC: 25 MMOL/L (ref 20–32)
COLOR UR AUTO: YELLOW
CREAT SERPL-MCNC: 1.32 MG/DL (ref 0.66–1.25)
CREAT UR-MCNC: 106 MG/DL
ERYTHROCYTE [DISTWIDTH] IN BLOOD BY AUTOMATED COUNT: 13.2 % (ref 10–15)
GFR SERPL CREATININE-BSD FRML MDRD: 63 ML/MIN/{1.73_M2}
GLUCOSE SERPL-MCNC: 84 MG/DL (ref 70–99)
GLUCOSE UR STRIP-MCNC: NEGATIVE MG/DL
HCT VFR BLD AUTO: 44.7 % (ref 40–53)
HDLC SERPL-MCNC: 43 MG/DL
HGB BLD-MCNC: 14.8 G/DL (ref 13.3–17.7)
HGB UR QL STRIP: NEGATIVE
KETONES UR STRIP-MCNC: NEGATIVE MG/DL
LDLC SERPL CALC-MCNC: 111 MG/DL
LEUKOCYTE ESTERASE UR QL STRIP: NEGATIVE
MCH RBC QN AUTO: 26.9 PG (ref 26.5–33)
MCHC RBC AUTO-ENTMCNC: 33.1 G/DL (ref 31.5–36.5)
MCV RBC AUTO: 81 FL (ref 78–100)
MUCOUS THREADS #/AREA URNS LPF: PRESENT /LPF
NITRATE UR QL: NEGATIVE
NONHDLC SERPL-MCNC: 128 MG/DL
PH UR STRIP: 5 PH (ref 5–7)
PLATELET # BLD AUTO: 220 10E9/L (ref 150–450)
POTASSIUM SERPL-SCNC: 3.8 MMOL/L (ref 3.4–5.3)
PROT SERPL-MCNC: 6.8 G/DL (ref 6.8–8.8)
PROT UR-MCNC: 0.1 G/L
PROT/CREAT 24H UR: 0.09 G/G CR (ref 0–0.2)
RBC # BLD AUTO: 5.5 10E12/L (ref 4.4–5.9)
RBC #/AREA URNS AUTO: 1 /HPF (ref 0–2)
SIROLIMUS BLD-MCNC: 5.6 UG/L (ref 5–15)
SODIUM SERPL-SCNC: 136 MMOL/L (ref 133–144)
SOURCE: ABNORMAL
SP GR UR STRIP: 1.01 (ref 1–1.03)
SQUAMOUS #/AREA URNS AUTO: <1 /HPF (ref 0–1)
TME LAST DOSE: NORMAL H
TRIGL SERPL-MCNC: 88 MG/DL
UROBILINOGEN UR STRIP-MCNC: 0 MG/DL (ref 0–2)
WBC # BLD AUTO: 5.2 10E9/L (ref 4–11)
WBC #/AREA URNS AUTO: <1 /HPF (ref 0–5)

## 2019-05-31 ENCOUNTER — TELEPHONE (OUTPATIENT)
Dept: TRANSPLANT | Facility: CLINIC | Age: 49
End: 2019-05-31

## 2019-05-31 NOTE — TELEPHONE ENCOUNTER
Call from Jarod.  Says he has been feeling sluggish, heart rate is in the low 40's.  He is on a beta blocker.  I advised him to call his PCP for assistance.  He will do.

## 2019-07-17 ENCOUNTER — DOCUMENTATION ONLY (OUTPATIENT)
Dept: TRANSPLANT | Facility: CLINIC | Age: 49
End: 2019-07-17

## 2019-07-24 DIAGNOSIS — Z13.220 LIPID SCREENING: ICD-10-CM

## 2019-07-24 DIAGNOSIS — Z94.4 LIVER REPLACED BY TRANSPLANT (H): ICD-10-CM

## 2019-10-05 DIAGNOSIS — Z13.220 LIPID SCREENING: ICD-10-CM

## 2019-10-05 DIAGNOSIS — Z94.4 LIVER REPLACED BY TRANSPLANT (H): ICD-10-CM

## 2019-10-05 LAB
ALBUMIN SERPL-MCNC: 3.8 G/DL (ref 3.4–5)
ALP SERPL-CCNC: 60 U/L (ref 40–150)
ALT SERPL W P-5'-P-CCNC: 30 U/L (ref 0–70)
ANION GAP SERPL CALCULATED.3IONS-SCNC: 5 MMOL/L (ref 3–14)
AST SERPL W P-5'-P-CCNC: 13 U/L (ref 0–45)
BILIRUB DIRECT SERPL-MCNC: 0.3 MG/DL (ref 0–0.2)
BILIRUB SERPL-MCNC: 1.3 MG/DL (ref 0.2–1.3)
BUN SERPL-MCNC: 17 MG/DL (ref 7–30)
CALCIUM SERPL-MCNC: 8.8 MG/DL (ref 8.5–10.1)
CHLORIDE SERPL-SCNC: 104 MMOL/L (ref 94–109)
CO2 SERPL-SCNC: 26 MMOL/L (ref 20–32)
CREAT SERPL-MCNC: 1.24 MG/DL (ref 0.66–1.25)
ERYTHROCYTE [DISTWIDTH] IN BLOOD BY AUTOMATED COUNT: 13.3 % (ref 10–15)
GFR SERPL CREATININE-BSD FRML MDRD: 68 ML/MIN/{1.73_M2}
GLUCOSE SERPL-MCNC: 82 MG/DL (ref 70–99)
HCT VFR BLD AUTO: 46.7 % (ref 40–53)
HGB BLD-MCNC: 15.1 G/DL (ref 13.3–17.7)
MCH RBC QN AUTO: 26.6 PG (ref 26.5–33)
MCHC RBC AUTO-ENTMCNC: 32.3 G/DL (ref 31.5–36.5)
MCV RBC AUTO: 82 FL (ref 78–100)
PLATELET # BLD AUTO: 201 10E9/L (ref 150–450)
POTASSIUM SERPL-SCNC: 4 MMOL/L (ref 3.4–5.3)
PROT SERPL-MCNC: 7.1 G/DL (ref 6.8–8.8)
RBC # BLD AUTO: 5.68 10E12/L (ref 4.4–5.9)
SIROLIMUS BLD-MCNC: 3.4 UG/L (ref 5–15)
SODIUM SERPL-SCNC: 136 MMOL/L (ref 133–144)
TME LAST DOSE: ABNORMAL H
WBC # BLD AUTO: 5.2 10E9/L (ref 4–11)

## 2020-01-03 ENCOUNTER — TELEPHONE (OUTPATIENT)
Dept: GASTROENTEROLOGY | Facility: CLINIC | Age: 50
End: 2020-01-03

## 2020-01-06 DIAGNOSIS — Z13.220 LIPID SCREENING: ICD-10-CM

## 2020-01-06 DIAGNOSIS — Z94.4 LIVER REPLACED BY TRANSPLANT (H): ICD-10-CM

## 2020-01-06 LAB
ALBUMIN SERPL-MCNC: 3.8 G/DL (ref 3.4–5)
ALBUMIN UR-MCNC: NEGATIVE MG/DL
ALP SERPL-CCNC: 57 U/L (ref 40–150)
ALT SERPL W P-5'-P-CCNC: 40 U/L (ref 0–70)
ANION GAP SERPL CALCULATED.3IONS-SCNC: 7 MMOL/L (ref 3–14)
APPEARANCE UR: CLEAR
AST SERPL W P-5'-P-CCNC: 20 U/L (ref 0–45)
BILIRUB DIRECT SERPL-MCNC: 0.3 MG/DL (ref 0–0.2)
BILIRUB SERPL-MCNC: 1.2 MG/DL (ref 0.2–1.3)
BILIRUB UR QL STRIP: NEGATIVE
BUN SERPL-MCNC: 20 MG/DL (ref 7–30)
CALCIUM SERPL-MCNC: 8.9 MG/DL (ref 8.5–10.1)
CHLORIDE SERPL-SCNC: 109 MMOL/L (ref 94–109)
CO2 SERPL-SCNC: 24 MMOL/L (ref 20–32)
COLOR UR AUTO: YELLOW
CREAT SERPL-MCNC: 1.3 MG/DL (ref 0.66–1.25)
CREAT UR-MCNC: 176 MG/DL
ERYTHROCYTE [DISTWIDTH] IN BLOOD BY AUTOMATED COUNT: 13.1 % (ref 10–15)
GFR SERPL CREATININE-BSD FRML MDRD: 64 ML/MIN/{1.73_M2}
GLUCOSE SERPL-MCNC: 87 MG/DL (ref 70–99)
GLUCOSE UR STRIP-MCNC: NEGATIVE MG/DL
HCT VFR BLD AUTO: 50.4 % (ref 40–53)
HGB BLD-MCNC: 16.4 G/DL (ref 13.3–17.7)
HGB UR QL STRIP: NEGATIVE
KETONES UR STRIP-MCNC: NEGATIVE MG/DL
LEUKOCYTE ESTERASE UR QL STRIP: NEGATIVE
MCH RBC QN AUTO: 27.1 PG (ref 26.5–33)
MCHC RBC AUTO-ENTMCNC: 32.5 G/DL (ref 31.5–36.5)
MCV RBC AUTO: 83 FL (ref 78–100)
MUCOUS THREADS #/AREA URNS LPF: PRESENT /LPF
NITRATE UR QL: NEGATIVE
PH UR STRIP: 5 PH (ref 5–7)
PLATELET # BLD AUTO: 208 10E9/L (ref 150–450)
POTASSIUM SERPL-SCNC: 3.8 MMOL/L (ref 3.4–5.3)
PROT SERPL-MCNC: 7.2 G/DL (ref 6.8–8.8)
PROT UR-MCNC: 0.18 G/L
PROT/CREAT 24H UR: 0.1 G/G CR (ref 0–0.2)
RBC # BLD AUTO: 6.06 10E12/L (ref 4.4–5.9)
RBC #/AREA URNS AUTO: 1 /HPF (ref 0–2)
SIROLIMUS BLD-MCNC: 4.7 UG/L (ref 5–15)
SODIUM SERPL-SCNC: 140 MMOL/L (ref 133–144)
SOURCE: ABNORMAL
SP GR UR STRIP: 1.01 (ref 1–1.03)
SQUAMOUS #/AREA URNS AUTO: <1 /HPF (ref 0–1)
TME LAST DOSE: ABNORMAL H
UROBILINOGEN UR STRIP-MCNC: 0 MG/DL (ref 0–2)
WBC # BLD AUTO: 5.7 10E9/L (ref 4–11)
WBC #/AREA URNS AUTO: 1 /HPF (ref 0–5)

## 2020-01-07 ENCOUNTER — OFFICE VISIT (OUTPATIENT)
Dept: NEPHROLOGY | Facility: CLINIC | Age: 50
End: 2020-01-07
Attending: INTERNAL MEDICINE
Payer: COMMERCIAL

## 2020-01-07 ENCOUNTER — DOCUMENTATION ONLY (OUTPATIENT)
Dept: CARE COORDINATION | Facility: CLINIC | Age: 50
End: 2020-01-07

## 2020-01-07 ENCOUNTER — OFFICE VISIT (OUTPATIENT)
Dept: GASTROENTEROLOGY | Facility: CLINIC | Age: 50
End: 2020-01-07
Attending: INTERNAL MEDICINE
Payer: COMMERCIAL

## 2020-01-07 VITALS
DIASTOLIC BLOOD PRESSURE: 85 MMHG | HEART RATE: 59 BPM | SYSTOLIC BLOOD PRESSURE: 157 MMHG | BODY MASS INDEX: 36.01 KG/M2 | OXYGEN SATURATION: 97 % | TEMPERATURE: 98.7 F | WEIGHT: 265.5 LBS

## 2020-01-07 VITALS
HEIGHT: 72 IN | SYSTOLIC BLOOD PRESSURE: 157 MMHG | TEMPERATURE: 98.7 F | WEIGHT: 265.4 LBS | OXYGEN SATURATION: 97 % | DIASTOLIC BLOOD PRESSURE: 85 MMHG | BODY MASS INDEX: 35.95 KG/M2 | HEART RATE: 59 BPM

## 2020-01-07 DIAGNOSIS — Z48.298 AFTERCARE FOLLOWING ORGAN TRANSPLANT: ICD-10-CM

## 2020-01-07 DIAGNOSIS — Z94.4 LIVER REPLACED BY TRANSPLANT (H): ICD-10-CM

## 2020-01-07 DIAGNOSIS — D75.1 POST-TRANSPLANT ERYTHROCYTOSIS: ICD-10-CM

## 2020-01-07 DIAGNOSIS — I15.1 HTN, KIDNEY TRANSPLANT RELATED: Primary | ICD-10-CM

## 2020-01-07 DIAGNOSIS — Z94.0 HTN, KIDNEY TRANSPLANT RELATED: Primary | ICD-10-CM

## 2020-01-07 DIAGNOSIS — E55.9 VITAMIN D DEFICIENCY: ICD-10-CM

## 2020-01-07 DIAGNOSIS — Z94.0 KIDNEY REPLACED BY TRANSPLANT: ICD-10-CM

## 2020-01-07 DIAGNOSIS — D75.1 RELATIVE ERYTHROCYTOSIS: ICD-10-CM

## 2020-01-07 DIAGNOSIS — D84.9 IMMUNOSUPPRESSION (H): ICD-10-CM

## 2020-01-07 DIAGNOSIS — Z94.4 LIVER REPLACED BY TRANSPLANT (H): Primary | ICD-10-CM

## 2020-01-07 PROCEDURE — G0463 HOSPITAL OUTPT CLINIC VISIT: HCPCS | Mod: ZF

## 2020-01-07 RX ORDER — LOSARTAN POTASSIUM 25 MG/1
25 TABLET ORAL AT BEDTIME
Qty: 90 TABLET | Refills: 3 | Status: SHIPPED | OUTPATIENT
Start: 2020-01-07 | End: 2020-03-02

## 2020-01-07 ASSESSMENT — MIFFLIN-ST. JEOR: SCORE: 2106.85

## 2020-01-07 ASSESSMENT — PAIN SCALES - GENERAL
PAINLEVEL: NO PAIN (0)
PAINLEVEL: NO PAIN (0)

## 2020-01-07 NOTE — NURSING NOTE
Chief Complaint   Patient presents with     RECHECK     Follow up Kidney TX     Vital signs:  Temp: 98.7  F (37.1  C)   BP: (!) 157/85 Pulse: 59     SpO2: 97 %       Weight: 120.4 kg (265 lb 8 oz)  Estimated body mass index is 36.01 kg/m  as calculated from the following:    Height as of an earlier encounter on 1/7/20: 1.829 m (6').    Weight as of this encounter: 120.4 kg (265 lb 8 oz).      Mirian Smith, CMA

## 2020-01-07 NOTE — LETTER
1/7/2020       RE: Jarod Quesada  [address] [place] [address]     Dear Colleague,    Thank you for referring your patient, Jarod Quesada, to the [facility] HEPATOLOGY at [facility]. Please see a copy of my visit note below.    [facility]  LIVER TRANSPLANT CLINIC     A/P  49 year old male s/p SLK 1/14/13 for ETOH.  Medically doing extremely well.    IS: Rapa and MMF  No changes to medications today. Labs up to date and normal.     Discussed skin cancer screening: UTD on derm  Discussed ventral hernia and weight gain. Recommend weight loss.  Colonoscopy in 2022 at age 50.  Shingles shot done last year  Had the flu shot this year  Will see back in 1 year.   This was a 25 minute visit, over 50% counseling and coordination of care.      Jeannine Biggs MD  Hepatology/ Liver Transplant  [facility]  ==================================================================  [place]: [place]. [place] [facility] [place]   Nephrology: Dr. Jalen Emerson     SUBJECTIVE  49 year old male s/p SLK 1/14/13 for ETOH.  EXPLANT: Cirrhosis, no HCC  IS: SRL and MMF  LABS: Up to date and normal liver tests        Lab Test 01/06/20  0751   PROTTOTAL 7.2   ALBUMIN 3.8   BILITOTAL 1.2   ALKPHOS 57   AST 20   ALT 40     REJECTION: None.  BILIARY ISSUES: None  STENT: No stent on abdominal imaging post transpalnt  KIDNEY FUNCTION: Sees Dr. Emerson. Stable. No proteinuria          Creatinine   Date Value Ref Range Status   01/06/2020 1.30 (H) 0.66 - 1.25 mg/dL Final     BP: Controlled on amlodipine, metoprolol. Checks at home in the 120s/70s. Today a little high.  PREV: UTD on screening (colonoscopy 6/29/2012 no polyps, area of ulceration. Path showed nonspecific changes. Record is located in Media tab on 9/21/12)   Derm Feb 8/2019  Flu shot had it.  DISEASE RECURRENCE: No alcohol  OTHER ISSUES: Struggles with weight gain. Also has neuropathy, on gabapentin.  Hyperlipidemia  Incisional hernia not worse  NEW ISSUES:  Had a R ROBERTA with Dr. Scott here in early November 2018.  Probably needs the other one.    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  ROS: 10 point ROS neg other than the symptoms noted above in the HPI.     OBJECTIVE  BP (!) 157/85   Pulse 59   Temp 98.7  F (37.1  C) (Oral)   Ht 1.829 m (6')   Wt 120.4 kg (265 lb 6.4 oz)   SpO2 97%   BMI 35.99 kg/m       GENERAL:  Very pleasant, well-appearing, in no acute distress.    HEENT:  No icterus, no oral lesions.    LYMPH:  No supraclavicular or cervical lymphadenopathy.    CARDIOVASCULAR:  Regular rate and rhythm.    CHEST:  Lungs are clear.    ABDOMEN:  Bowel sounds are present.  Abdomen is soft, nontender, nondistended.  Med soft ventral hernia.    EXTREMITIES:  No edema.    SKIN:  No rash.    NEUROLOGIC:  Speech is fluent and clear.  No asterixis or tremor.        Again, thank you for allowing me to participate in the care of your patient.      Sincerely,    Jeannine Biggs MD

## 2020-01-07 NOTE — LETTER
1/7/2020      RE: Jarod Quesada  [address] [place] [address]       CHRONIC TRANSPLANT NEPHROLOGY VISIT    Assessment & Plan   # LDKT: Stable   - Baseline Cr ~ 1.2-1.4   - Proteinuria: Normal (<0.2 grams)   - Date DSA Last Checked: Oct/2017      Latest DSA: No   - BK Viremia: Not checked recently due to time from transplant   - Kidney Tx Biopsy: No    # Liver Tx: Appears stable with normal LFT's. Follows with Hepatology.     # Immunosuppression: Mycophenolate mofetil (goal not followed) and Sirolimus (goal 4-6)   - Changes: No    # Infection Prophylaxis:   - PJP: None    # Hypertension: Controlled;  Goal BP: < 130/80   - Changes: Yes - Would some bradycardia and no cardiac history, would recommend stopping metoprolol and starting losartan 25 mg nightly.  Recommend recheck BMP in a couple of weeks.   - In addition, with some leg swelling, would recommend changing amlodipine to nightly dosing.    # Post-Transplant Erythrocytosis: Hgb: Trend up   ACEI/ARB: Yes, started today   - Will obtain bilateral native kidney and kidney transplant ultrasound to rule out renal cell cancer producing erythropoietin.    # Mineral Bone Disorder:   - Vitamin D; level: Not checked recently        On Supplement: Yes  - Calcium; level: Normal        On Supplement: No    # Overweight: No change in overall weight.    - Recommend weight loss for overall health by increasing exercise and watching caloric intake.    # Skin Cancer Risk: New lesions: no   - Discussed sun protection and recommend regular follow up with Dermatology every 6 months.    - Previous removals were non-cancerous.     # Medical Compliance: Yes    # Transplant History:  Etiology of Kidney Failure: Hepatorenal syndrome (HRS)  Tx: LDKT and Liver Tx  Transplant: 1/14/2013 (Kidney), 1/14/2013 (Liver)  Donor Type: Donation after Brain Death Donor Class: Standard Criteria Donor  Significant changes in immunosuppression: Previously on tacrolimus and cyclosporine  (separately)  Significant transplant-related complications: None    Transplant Office Phone Number: [phone]    Assessment and plan was discussed with the patient and he voiced his understanding and agreement.    Return visit: Return in about 1 year (around 1/7/2021).    Chief Complaint   Mr. Cherelle Quesada is a 49 year old here for routine follow up.    History of Present Illness   Jarod Quesada is a 49 year old male with a history of ESKD secondary to hepatorenal syndrome status post DDKT (SLK) completed on 1/14/13. He was last seen in clinic by Dr. Ovalles on 1/8/19; please see that note for further details.     Since his last visit, the patient has felt well overall. He had his right hip replaced in October 2018, and describes doing well now. He describes that his left hip made need to be replaced as well. His hip pain has led to the patient not being as active, but has ideas such as swimming. He has done great kidney-wise, and denies any symptoms aside from occasional diarrhea and leg swelling during the summer after long periods of standing. He denies any other symptoms or concerns during this time.     Today, the patient continues to feel well. He denied any recent hospitalizations or new medicals. His energy level has been stable, with no chest pain or shortness of breath with exertion. His appetite and weight have also remained stable. No nausea or vomiting. Occasional diarrhea. No fever, sweats, or chills. No currently lower extremity edema.     The patient denies any history of cardiac events, although he is on metoprolol. He describes that he was previously on a higher dose of metoprolol than his current 25 mg daily, which made his heart rate drop as low at 40 bpm. He was placed on this during a hospitalization years ago, without indication for this medication choice. He inquired about his native kidneys today, but was informed he has a low risk of issues with them post-transplantation.  However, the patient has concerns due to his trending up hemoglobin over time, and would like to have his native kidney imaged to rule out any complications. He denied any other questions or concerns today.     Recent Hospitalizations:  [x] No [] Yes    New Medical Issues: [x] No [] Yes    Decreased energy: [x] No [] Yes    Chest pain or SOB with exertion:  [x] No [] Yes    Appetite change or weight change: [x] No [] Yes    Nausea, vomiting or diarrhea:  [] No [x] Yes Occasionally    Fever, sweats or chills: [x] No [] Yes    Leg swelling: [x] No [] Yes Occasionally over the summer, none in clinic today     Home BP: 120/80's    Review of Systems   A comprehensive review of systems was obtained and negative, except as noted in the HPI or PMH.    Problem List   Patient Active Problem List   Diagnosis     Generalized anxiety disorder     Kidney replaced by transplant     Liver replaced by transplant (H)     Immunosuppression (H)     HTN, kidney transplant related     Secondary renal hyperparathyroidism (H)     Aftercare following organ transplant     Status post hip replacement, right     Vitamin D deficiency     Post-transplant erythrocytosis       Social History   Social History     Tobacco Use     Smoking status: Never Smoker     Smokeless tobacco: Never Used   Substance Use Topics     Alcohol use: No     Comment: Occasional, rare     Drug use: No       Allergies   Allergies   Allergen Reactions     Paxil [Paroxetine] Other (See Comments)     Caused Severe Tremor     Cepacol Maximum Strength Rash     Chlorhexidine Rash     Phenol Rash       Medications   Current Outpatient Medications   Medication Sig     amLODIPine (NORVASC) 5 MG tablet Take 5 mg by mouth daily     gabapentin (NEURONTIN) 100 MG capsule Take 6 capsules (600 mg) by mouth in the morning, take 5 capsules (500 mg) by mouth in the evening.     losartan (COZAAR) 25 MG tablet Take 1 tablet (25 mg) by mouth At Bedtime     multivitamin, therapeutic with  minerals (MULTI-VITAMIN) TABS tablet Take 1 tablet by mouth daily     mycophenolate (GENERIC EQUIVALENT) 250 MG capsule Take 3 capsules (750 mg) by mouth 2 times daily     sildenafil (VIAGRA) 50 MG tablet Take 0.5-1 tablets (25-50 mg) by mouth daily as needed for erectile dysfunction Take 30 min to 4 hours before intercourse.  Never use with nitroglycerin, terazosin or doxazosin.     sirolimus (GENERIC EQUIVALENT) 1 MG tablet Take 1 tablet (1 mg) by mouth daily     CHOLECALCIFEROL PO Take 5,000 Units by mouth daily      mometasone (ELOCON) 0.1 % ointment Apply topically 2 times daily as needed     No current facility-administered medications for this visit.      Medications Discontinued During This Encounter   Medication Reason     HYDROmorphone (DILAUDID) 2 MG tablet      order for DME      senna-docusate (SENOKOT-S;PERICOLACE) 8.6-50 MG per tablet      tamsulosin (FLOMAX) 0.4 MG capsule      metoprolol (LOPRESSOR) 50 MG tablet        Physical Exam   Vital Signs: BP (!) 157/85   Pulse 59   Temp 98.7  F (37.1  C)   Wt 120.4 kg (265 lb 8 oz)   SpO2 97%   BMI 36.01 kg/m       GENERAL APPEARANCE: alert and no distress  HENT: mouth without ulcers or lesions  LYMPHATICS: no cervical or supraclavicular nodes  RESP: lungs clear to auscultation - no rales, rhonchi or wheezes  CV: regular rhythm, normal rate, no rub, no murmur  EDEMA: no LE edema bilaterally  ABDOMEN: soft, nondistended, nontender, bowel sounds normal, small to moderate right-side incisional hernia  MS: extremities normal - no gross deformities noted, no evidence of inflammation in joints, no muscle tenderness  SKIN: no rash  TX KIDNEY: normal  DIALYSIS ACCESS:  None      Data     Renal Latest Ref Rng & Units 1/6/2020 10/5/2019 5/9/2019   Na 133 - 144 mmol/L 140 136 136   Na (external) 135 - 145 mmol/L - - -   K 3.4 - 5.3 mmol/L 3.8 4.0 3.8   K (external) 3.5 - 5.0 mmol/L - - -   Cl 94 - 109 mmol/L 109 104 103   Cl (external) 98 - 110 mmol/L - - -   CO2  20 - 32 mmol/L 24 26 25   CO2 (external) 21 - 31 mmol/L - - -   BUN 7 - 30 mg/dL 20 17 19   BUN (external) 8 - 25 mg/dL - - -   Cr 0.66 - 1.25 mg/dL 1.30(H) 1.24 1.32(H)   Cr (external) 0.72 - 1.25 mg/dL - - -   Glucose 70 - 99 mg/dL 87 82 84   Glucose (external) 65 - 100 mg/dL - - -   Ca  8.5 - 10.1 mg/dL 8.9 8.8 9.1   Ca (external) 8.5 - 10.5 mg/dL - - -   Mg 1.6 - 2.3 mg/dL - - -   Mg (external) 1.6 - 2.6 mg/dL - - -     Bone Health Latest Ref Rng & Units 7/24/2015 6/1/2015 1/22/2015   Phos 2.5 - 4.5 mg/dL 2.4(L) 3.1 1.9(L)   Phos (external) 2.3 - 4.7 mg/dL - - -   PTHi 12 - 72 pg/mL - - -     Heme Latest Ref Rng & Units 1/6/2020 10/5/2019 5/9/2019   WBC 4.0 - 11.0 10e9/L 5.7 5.2 5.2   WBC (external) 4.5 - 11.0 thou/cu mm - - -   Hgb 13.3 - 17.7 g/dL 16.4 15.1 14.8   Hgb (external) 13.5 - 17.5 g/dL - - -   Plt 150 - 450 10e9/L 208 201 220   Plt (external) 140 - 440 thou/cu mm - - -     Liver Latest Ref Rng & Units 1/6/2020 10/5/2019 5/9/2019   AP 40 - 150 U/L 57 60 65   AP (external) 50 - 136 U/L - - -   TBili 0.2 - 1.3 mg/dL 1.2 1.3 0.9   TBili (external) 0.2 - 1.2 mg/dL - - -   DBili 0.0 - 0.2 mg/dL 0.3(H) 0.3(H) 0.2   DBili (external) 0.1 - 0.5 mg/dL - - -   ALT 0 - 70 U/L 40 30 25   ALT (external) 8 - 45 U/L - - -   AST 0 - 45 U/L 20 13 17   AST (external) 2 - 40 U/L - - -   Tot Protein 6.8 - 8.8 g/dL 7.2 7.1 6.8   Tot Protein (external) 6.0 - 8.0 g/dL - - -   Albumin 3.4 - 5.0 g/dL 3.8 3.8 3.6   Albumin (external) 3.5 - 5.2 g/dL - - -     Pancreas Latest Ref Rng & Units 1/15/2013 1/13/2013 1/6/2013   Amylase 30 - 110 U/L <30(L) 59 116(H)   Lipase 20 - 250 U/L 28 - 263(H)     Iron studies Latest Ref Rng & Units 1/8/2013 10/3/2012   Iron 35 - 180 ug/dL 15(L) 82   Iron sat 15 - 46 % 12(L) 70(H)   Ferritin 20 - 300 ng/mL 500(H) -     [facility] Virology Latest Ref Rng & Units 2/3/2014 1/21/2014 1/30/2013   CVM DNA Quant - - - Whole blood, EDTA anticoagulant   CMV Quant <100 Copies/mL - - <100  No CMV DNA detected.    CMV QT Log <2.0 Log copies/mL - - <2.0  The Cytomegalovirus DNA Quantitation assay is a real-time polymerase chain   reaction (PCR) utilizing analyte specific reagents manufactured by [employer]   [employer]. Analyte Specific Reagents (ASRs) are used in many laboratory   tests necessary for standard medical care and generally do not require FDA   approval.   This test was developed and its performance characteristics determined by   [facility].  It has not been   cleared or approved by the US Food and Drug Administration.   BK Spec - - - Plasma, EDTA anticoagulant   BK Res <1000 copies/mL - - <1000   BK Log <3.0 Log copies/mL - - <3.0  The lower limit of detection for this assay is 1000 copies/mL.  Real-time TaqMan   PCR was performed using BK primers and probe for the detection of a 90 bp   portion of the  1 gene.  The performance characteristics were validated by   the [facility].   It has not been cleared or approved by the U.S. Food and Drug Administration.   Hep B Core NEG - Negative -   Hep B Surf - 1.2 - -   HIV 1&2 NEG - - -        Recent Labs   Lab Test 08/12/13  0745 08/19/13  0755 09/09/13  0745   DOSTAC 2000, 08/11/13 08/18/13 2000 NTP   TACROL 5.9 <3.0 Tacrolimus Reference Range  Kidney Transplant  Pediatric                      ug/L    0-3 months post transplant   10-12    3-6 months post transplant   8-10    6-12 months post transplant  6-8    >12 months post transplant   4-7  Adult    0-6 months post transplant   8-10    6-12 months post transplant  6-8    >12 months post transplant   4-6    >5 years post transplant     3-5  Heart Transplant  Pediatric    0-12 months post transplant  10-15    >12 months post transplant   5-10  Adult    0-3 months post transplant   10-15    3-6 months post transplant   8-12    6-12 months post transplant  6-12    >12 months post transplant   6-10  Lung Transplant    0-12 months post  transplant  10-15    >12 months post transplant   8-12  Liver Transplant  Pediatric    0-3 months post transplant   10-15    3-6 months post transplant   8-10    >6 months post transplant    6-8  Adult    0-3 months post transplant   10-12    3-6 months post transplant   8-10    >6 months post transplant    6-8  Pancreas Transplant    0-6 months post transplant   8-10    >6 months post transplant    5-8* <3.0 Tacrolimus Reference Range  Kidney Transplant  Pediatric                      ug/L    0-3 months post transplant   10-12    3-6 months post transplant   8-10    6-12 months post transplant  6-8    >12 months post transplant   4-7  Adult    0-6 months post transplant   8-10    6-12 months post transplant  6-8    >12 months post transplant   4-6    >5 years post transplant     3-5  Heart Transplant  Pediatric    0-12 months post transplant  10-15    >12 months post transplant   5-10  Adult    0-3 months post transplant   10-15    3-6 months post transplant   8-12    6-12 months post transplant  6-12    >12 months post transplant   6-10  Lung Transplant    0-12 months post transplant  10-15    >12 months post transplant   8-12  Liver Transplant  Pediatric    0-3 months post transplant   10-15    3-6 months post transplant   8-10    >6 months post transplant    6-8  Adult    0-3 months post transplant   10-12    3-6 months post transplant   8-10    >6 months post transplant    6-8  Pancreas Transplant    0-6 months post transplant   8-10    >6 months post transplant    5-8*     Recent Labs   Lab Test 05/15/17  0741 10/04/18  0820 10/15/18  0748   DOSMPA 8p 5.14.2017 Not Provided Not Provided   MPACID 1.20 0.97* 1.10   MPAG 51.8 70.7 76.6     Scribe Disclosure:  I, Kaelyn Gomez, am serving as a scribe to document services personally performed by Doyle Timmons M.D. at this visit, based upon the provider's statements to me. All documentation has been reviewed by the aforementioned provider prior to being entered  into the official medical record.    Doyle Timmons MD

## 2020-01-07 NOTE — PROGRESS NOTES
[facility]  LIVER TRANSPLANT CLINIC     A/P  49 year old male s/p SLK 1/14/13 for ETOH.  Medically doing extremely well.    IS: Rapa and MMF  No changes to medications today. Labs up to date and normal.     Discussed skin cancer screening: UTD on derm  Discussed ventral hernia and weight gain. Recommend weight loss.  Colonoscopy in 2022 at age 50.  Shingles shot done last year  Had the flu shot this year  Will see back in 1 year.   This was a 25 minute visit, over 50% counseling and coordination of care.      Jeannine Biggs MD  Hepatology/ Liver Transplant  [facility]  ==================================================================  [place] [facility] [place]phrology: Dr. Jalen Emerson     SUBJECTIVE  49 year old male s/p SLK 1/14/13 for ETOH.  EXPLANT: Cirrhosis, no HCC  IS: SRL and MMF  LABS: Up to date and normal liver tests    Lab Test 01/06/20  0751   PROTTOTAL 7.2   ALBUMIN 3.8   BILITOTAL 1.2   ALKPHOS 57   AST 20   ALT 40     REJECTION: None.  BILIARY ISSUES: None  STENT: No stent on abdominal imaging post transpalnt  KIDNEY FUNCTION: Sees Dr. Emerson. Stable. No proteinuria    Creatinine   Date Value Ref Range Status   01/06/2020 1.30 (H) 0.66 - 1.25 mg/dL Final     BP: Controlled on amlodipine, metoprolol. Checks at home in the 120s/70s. Today a little high.  PREV: UTD on screening (colonoscopy 6/29/2012 no polyps, area of ulceration. Path showed nonspecific changes. Record is located in Media tab on 9/21/12)   Derm Feb 8/2019  Flu shot had it.  DISEASE RECURRENCE: No alcohol  OTHER ISSUES: Struggles with weight gain. Also has neuropathy, on gabapentin. Hyperlipidemia  Incisional hernia not worse  NEW ISSUES:  Had a R ROBERTA with Dr. Scott here in early November 2018. Probably needs the other one.    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  ROS: 10 point ROS neg other than the symptoms noted above in the HPI.     OBJECTIVE  BP (!) 157/85   Pulse 59    Temp 98.7  F (37.1  C) (Oral)   Ht 1.829 m (6')   Wt 120.4 kg (265 lb 6.4 oz)   SpO2 97%   BMI 35.99 kg/m      GENERAL:  Very pleasant, well-appearing, in no acute distress.    HEENT:  No icterus, no oral lesions.    LYMPH:  No supraclavicular or cervical lymphadenopathy.    CARDIOVASCULAR:  Regular rate and rhythm.    CHEST:  Lungs are clear.    ABDOMEN:  Bowel sounds are present.  Abdomen is soft, nontender, nondistended.  Med soft ventral hernia.    EXTREMITIES:  No edema.    SKIN:  No rash.    NEUROLOGIC:  Speech is fluent and clear.  No asterixis or tremor.

## 2020-01-07 NOTE — LETTER
1/7/2020      RE: Jarod Quesada  [address] [place] [address]       [facility]  LIVER TRANSPLANT CLINIC     A/P  49 year old male s/p SLK 1/14/13 for ETOH.  Medically doing extremely well.    IS: Rapa and MMF  No changes to medications today. Labs up to date and normal.     Discussed skin cancer screening: UTD on derm  Discussed ventral hernia and weight gain. Recommend weight loss.  Colonoscopy in 2022 at age 50.  Shingles shot done last year  Had the flu shot this year  Will see back in 1 year.   This was a 25 minute visit, over 50% counseling and coordination of care.      Jeannine Biggs MD  Hepatology/ Liver Transplant  [facility]  ==================================================================  [place]. [place] [facility] [place]   Nephrology: Dr. Jalen Emerson     SUBJECTIVE  49 year old male s/p SLK 1/14/13 for ETOH.  EXPLANT: Cirrhosis, no HCC  IS: SRL and MMF  LABS: Up to date and normal liver tests        Lab Test 01/06/20  0751   PROTTOTAL 7.2   ALBUMIN 3.8   BILITOTAL 1.2   ALKPHOS 57   AST 20   ALT 40     REJECTION: None.  BILIARY ISSUES: None  STENT: No stent on abdominal imaging post transpalnt  KIDNEY FUNCTION: Sees Dr. Emerson. Stable. No proteinuria          Creatinine   Date Value Ref Range Status   01/06/2020 1.30 (H) 0.66 - 1.25 mg/dL Final     BP: Controlled on amlodipine, metoprolol. Checks at home in the 120s/70s. Today a little high.  PREV: UTD on screening (colonoscopy 6/29/2012 no polyps, area of ulceration. Path showed nonspecific changes. Record is located in Media tab on 9/21/12)   Derm Feb 8/2019  Flu shot had it.  DISEASE RECURRENCE: No alcohol  OTHER ISSUES: Struggles with weight gain. Also has neuropathy, on gabapentin. Hyperlipidemia  Incisional hernia not worse  NEW ISSUES:  Had a R ROBERTA with Dr. Scott here in early November 2018.  Probably needs the other one.    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  ROS: 10 point ROS  neg other than the symptoms noted above in the HPI.     OBJECTIVE  BP (!) 157/85   Pulse 59   Temp 98.7  F (37.1  C) (Oral)   Ht 1.829 m (6')   Wt 120.4 kg (265 lb 6.4 oz)   SpO2 97%   BMI 35.99 kg/m       GENERAL:  Very pleasant, well-appearing, in no acute distress.    HEENT:  No icterus, no oral lesions.    LYMPH:  No supraclavicular or cervical lymphadenopathy.    CARDIOVASCULAR:  Regular rate and rhythm.    CHEST:  Lungs are clear.    ABDOMEN:  Bowel sounds are present.  Abdomen is soft, nontender, nondistended.  Med soft ventral hernia.    EXTREMITIES:  No edema.    SKIN:  No rash.    NEUROLOGIC:  Speech is fluent and clear.  No asterixis or tremor.        Jeannine Biggs MD

## 2020-01-07 NOTE — PROGRESS NOTES
CHRONIC TRANSPLANT NEPHROLOGY VISIT    Assessment & Plan   # LDKT: Stable   - Baseline Cr ~ 1.2-1.4   - Proteinuria: Normal (<0.2 grams)   - Date DSA Last Checked: Oct/2017      Latest DSA: No   - BK Viremia: Not checked recently due to time from transplant   - Kidney Tx Biopsy: No    # Liver Tx: Appears stable with normal LFT's. Follows with Hepatology.     # Immunosuppression: Mycophenolate mofetil (goal not followed) and Sirolimus (goal 4-6)   - Changes: No    # Infection Prophylaxis:   - PJP: None    # Hypertension: Controlled;  Goal BP: < 130/80   - Changes: Yes - Would some bradycardia and no cardiac history, would recommend stopping metoprolol and starting losartan 25 mg nightly.  Recommend recheck BMP in a couple of weeks.   - In addition, with some leg swelling, would recommend changing amlodipine to nightly dosing.    # Post-Transplant Erythrocytosis: Hgb: Trend up   ACEI/ARB: Yes, started today   - Will obtain bilateral native kidney and kidney transplant ultrasound to rule out renal cell cancer producing erythropoietin.    # Mineral Bone Disorder:   - Vitamin D; level: Not checked recently        On Supplement: Yes  - Calcium; level: Normal        On Supplement: No    # Overweight: No change in overall weight.    - Recommend weight loss for overall health by increasing exercise and watching caloric intake.    # Skin Cancer Risk: New lesions: no   - Discussed sun protection and recommend regular follow up with Dermatology every 6 months.    - Previous removals were non-cancerous.     # Medical Compliance: Yes    # Transplant History:  Etiology of Kidney Failure: Hepatorenal syndrome (HRS)  Tx: LDKT and Liver Tx  Transplant: 1/14/2013 (Kidney), 1/14/2013 (Liver)  Donor Type: Donation after Brain Death Donor Class: Standard Criteria Donor  Significant changes in immunosuppression: Previously on tacrolimus and cyclosporine (separately)  Significant transplant-related complications: None    Transplant Office  Phone Number: [phone]    Assessment and plan was discussed with the patient and he voiced his understanding and agreement.    Return visit: Return in about 1 year (around 1/7/2021).    Chief Complaint   Mr. Cherelle Quesada is a 49 year old here for routine follow up.    History of Present Illness   Jarod Quesada is a 49 year old male with a history of ESKD secondary to hepatorenal syndrome status post DDKT (SLK) completed on 1/14/13. He was last seen in clinic by Dr. Ovalles on 1/8/19; please see that note for further details.     Since his last visit, the patient has felt well overall. He had his right hip replaced in October 2018, and describes doing well now. He describes that his left hip made need to be replaced as well. His hip pain has led to the patient not being as active, but has ideas such as swimming. He has done great kidney-wise, and denies any symptoms aside from occasional diarrhea and leg swelling during the summer after long periods of standing. He denies any other symptoms or concerns during this time.     Today, the patient continues to feel well. He denied any recent hospitalizations or new medicals. His energy level has been stable, with no chest pain or shortness of breath with exertion. His appetite and weight have also remained stable. No nausea or vomiting. Occasional diarrhea. No fever, sweats, or chills. No currently lower extremity edema.     The patient denies any history of cardiac events, although he is on metoprolol. He describes that he was previously on a higher dose of metoprolol than his current 25 mg daily, which made his heart rate drop as low at 40 bpm. He was placed on this during a hospitalization years ago, without indication for this medication choice. He inquired about his native kidneys today, but was informed he has a low risk of issues with them post-transplantation. However, the patient has concerns due to his trending up hemoglobin over time, and would  like to have his native kidney imaged to rule out any complications. He denied any other questions or concerns today.     Recent Hospitalizations:  [x] No [] Yes    New Medical Issues: [x] No [] Yes    Decreased energy: [x] No [] Yes    Chest pain or SOB with exertion:  [x] No [] Yes    Appetite change or weight change: [x] No [] Yes    Nausea, vomiting or diarrhea:  [] No [x] Yes Occasionally    Fever, sweats or chills: [x] No [] Yes    Leg swelling: [x] No [] Yes Occasionally over the summer, none in clinic today     Home BP: 120/80's    Review of Systems   A comprehensive review of systems was obtained and negative, except as noted in the HPI or PMH.    Problem List   Patient Active Problem List   Diagnosis     Generalized anxiety disorder     Kidney replaced by transplant     Liver replaced by transplant (H)     Immunosuppression (H)     HTN, kidney transplant related     Secondary renal hyperparathyroidism (H)     Aftercare following organ transplant     Status post hip replacement, right     Vitamin D deficiency     Post-transplant erythrocytosis       Social History   Social History     Tobacco Use     Smoking status: Never Smoker     Smokeless tobacco: Never Used   Substance Use Topics     Alcohol use: No     Comment: Occasional, rare     Drug use: No       Allergies   Allergies   Allergen Reactions     Paxil [Paroxetine] Other (See Comments)     Caused Severe Tremor     Cepacol Maximum Strength Rash     Chlorhexidine Rash     Phenol Rash       Medications   Current Outpatient Medications   Medication Sig     amLODIPine (NORVASC) 5 MG tablet Take 5 mg by mouth daily     gabapentin (NEURONTIN) 100 MG capsule Take 6 capsules (600 mg) by mouth in the morning, take 5 capsules (500 mg) by mouth in the evening.     losartan (COZAAR) 25 MG tablet Take 1 tablet (25 mg) by mouth At Bedtime     multivitamin, therapeutic with minerals (MULTI-VITAMIN) TABS tablet Take 1 tablet by mouth daily     mycophenolate (GENERIC  EQUIVALENT) 250 MG capsule Take 3 capsules (750 mg) by mouth 2 times daily     sildenafil (VIAGRA) 50 MG tablet Take 0.5-1 tablets (25-50 mg) by mouth daily as needed for erectile dysfunction Take 30 min to 4 hours before intercourse.  Never use with nitroglycerin, terazosin or doxazosin.     sirolimus (GENERIC EQUIVALENT) 1 MG tablet Take 1 tablet (1 mg) by mouth daily     CHOLECALCIFEROL PO Take 5,000 Units by mouth daily      mometasone (ELOCON) 0.1 % ointment Apply topically 2 times daily as needed     No current facility-administered medications for this visit.      Medications Discontinued During This Encounter   Medication Reason     HYDROmorphone (DILAUDID) 2 MG tablet      order for DME      senna-docusate (SENOKOT-S;PERICOLACE) 8.6-50 MG per tablet      tamsulosin (FLOMAX) 0.4 MG capsule      metoprolol (LOPRESSOR) 50 MG tablet        Physical Exam   Vital Signs: BP (!) 157/85   Pulse 59   Temp 98.7  F (37.1  C)   Wt 120.4 kg (265 lb 8 oz)   SpO2 97%   BMI 36.01 kg/m      GENERAL APPEARANCE: alert and no distress  HENT: mouth without ulcers or lesions  LYMPHATICS: no cervical or supraclavicular nodes  RESP: lungs clear to auscultation - no rales, rhonchi or wheezes  CV: regular rhythm, normal rate, no rub, no murmur  EDEMA: no LE edema bilaterally  ABDOMEN: soft, nondistended, nontender, bowel sounds normal, small to moderate right-side incisional hernia  MS: extremities normal - no gross deformities noted, no evidence of inflammation in joints, no muscle tenderness  SKIN: no rash  TX KIDNEY: normal  DIALYSIS ACCESS:  None      Data     Renal Latest Ref Rng & Units 1/6/2020 10/5/2019 5/9/2019   Na 133 - 144 mmol/L 140 136 136   Na (external) 135 - 145 mmol/L - - -   K 3.4 - 5.3 mmol/L 3.8 4.0 3.8   K (external) 3.5 - 5.0 mmol/L - - -   Cl 94 - 109 mmol/L 109 104 103   Cl (external) 98 - 110 mmol/L - - -   CO2 20 - 32 mmol/L 24 26 25   CO2 (external) 21 - 31 mmol/L - - -   BUN 7 - 30 mg/dL 20 17 19    BUN (external) 8 - 25 mg/dL - - -   Cr 0.66 - 1.25 mg/dL 1.30(H) 1.24 1.32(H)   Cr (external) 0.72 - 1.25 mg/dL - - -   Glucose 70 - 99 mg/dL 87 82 84   Glucose (external) 65 - 100 mg/dL - - -   Ca  8.5 - 10.1 mg/dL 8.9 8.8 9.1   Ca (external) 8.5 - 10.5 mg/dL - - -   Mg 1.6 - 2.3 mg/dL - - -   Mg (external) 1.6 - 2.6 mg/dL - - -     Bone Health Latest Ref Rng & Units 7/24/2015 6/1/2015 1/22/2015   Phos 2.5 - 4.5 mg/dL 2.4(L) 3.1 1.9(L)   Phos (external) 2.3 - 4.7 mg/dL - - -   PTHi 12 - 72 pg/mL - - -     Heme Latest Ref Rng & Units 1/6/2020 10/5/2019 5/9/2019   WBC 4.0 - 11.0 10e9/L 5.7 5.2 5.2   WBC (external) 4.5 - 11.0 thou/cu mm - - -   Hgb 13.3 - 17.7 g/dL 16.4 15.1 14.8   Hgb (external) 13.5 - 17.5 g/dL - - -   Plt 150 - 450 10e9/L 208 201 220   Plt (external) 140 - 440 thou/cu mm - - -     Liver Latest Ref Rng & Units 1/6/2020 10/5/2019 5/9/2019   AP 40 - 150 U/L 57 60 65   AP (external) 50 - 136 U/L - - -   TBili 0.2 - 1.3 mg/dL 1.2 1.3 0.9   TBili (external) 0.2 - 1.2 mg/dL - - -   DBili 0.0 - 0.2 mg/dL 0.3(H) 0.3(H) 0.2   DBili (external) 0.1 - 0.5 mg/dL - - -   ALT 0 - 70 U/L 40 30 25   ALT (external) 8 - 45 U/L - - -   AST 0 - 45 U/L 20 13 17   AST (external) 2 - 40 U/L - - -   Tot Protein 6.8 - 8.8 g/dL 7.2 7.1 6.8   Tot Protein (external) 6.0 - 8.0 g/dL - - -   Albumin 3.4 - 5.0 g/dL 3.8 3.8 3.6   Albumin (external) 3.5 - 5.2 g/dL - - -     Pancreas Latest Ref Rng & Units 1/15/2013 1/13/2013 1/6/2013   Amylase 30 - 110 U/L <30(L) 59 116(H)   Lipase 20 - 250 U/L 28 - 263(H)     Iron studies Latest Ref Rng & Units 1/8/2013 10/3/2012   Iron 35 - 180 ug/dL 15(L) 82   Iron sat 15 - 46 % 12(L) 70(H)   Ferritin 20 - 300 ng/mL 500(H) -     [facility] Txp Virology Latest Ref Rng & Units 2/3/2014 1/21/2014 1/30/2013   CVM DNA Quant - - - Whole blood, EDTA anticoagulant   CMV Quant <100 Copies/mL - - <100  No CMV DNA detected.   CMV QT Log <2.0 Log copies/mL - - <2.0  The Cytomegalovirus DNA Quantitation assay is a  real-time polymerase chain   reaction (PCR) utilizing analyte specific reagents manufactured by [employer]   [employer]. Analyte Specific Reagents (ASRs) are used in many laboratory   tests necessary for standard medical care and generally do not require FDA   approval.   This test was developed and its performance characteristics determined by   [facility].  It has not been   cleared or approved by the US Food and Drug Administration.   BK Spec - - - Plasma, EDTA anticoagulant   BK Res <1000 copies/mL - - <1000   BK Log <3.0 Log copies/mL - - <3.0  The lower limit of detection for this assay is 1000 copies/mL.  Real-time TaqMan   PCR was performed using BK primers and probe for the detection of a 90 bp   portion of the  1 gene.  The performance characteristics were validated by   the [facility], [place].   It has not been cleared or approved by the U.S. Food and Drug Administration.   Hep B Core NEG - Negative -   Hep B Surf - 1.2 - -   HIV 1&2 NEG - - -        Recent Labs   Lab Test 08/12/13  0745 08/19/13  0755 09/09/13  0745   DOSTAC 2000, 08/11/13 08/18/13 2000 NTP   TACROL 5.9 <3.0 Tacrolimus Reference Range  Kidney Transplant  Pediatric                      ug/L    0-3 months post transplant   10-12    3-6 months post transplant   8-10    6-12 months post transplant  6-8    >12 months post transplant   4-7  Adult    0-6 months post transplant   8-10    6-12 months post transplant  6-8    >12 months post transplant   4-6    >5 years post transplant     3-5  Heart Transplant  Pediatric    0-12 months post transplant  10-15    >12 months post transplant   5-10  Adult    0-3 months post transplant   10-15    3-6 months post transplant   8-12    6-12 months post transplant  6-12    >12 months post transplant   6-10  Lung Transplant    0-12 months post transplant  10-15    >12 months post transplant   8-12  Liver Transplant  Pediatric    0-3  months post transplant   10-15    3-6 months post transplant   8-10    >6 months post transplant    6-8  Adult    0-3 months post transplant   10-12    3-6 months post transplant   8-10    >6 months post transplant    6-8  Pancreas Transplant    0-6 months post transplant   8-10    >6 months post transplant    5-8* <3.0 Tacrolimus Reference Range  Kidney Transplant  Pediatric                      ug/L    0-3 months post transplant   10-12    3-6 months post transplant   8-10    6-12 months post transplant  6-8    >12 months post transplant   4-7  Adult    0-6 months post transplant   8-10    6-12 months post transplant  6-8    >12 months post transplant   4-6    >5 years post transplant     3-5  Heart Transplant  Pediatric    0-12 months post transplant  10-15    >12 months post transplant   5-10  Adult    0-3 months post transplant   10-15    3-6 months post transplant   8-12    6-12 months post transplant  6-12    >12 months post transplant   6-10  Lung Transplant    0-12 months post transplant  10-15    >12 months post transplant   8-12  Liver Transplant  Pediatric    0-3 months post transplant   10-15    3-6 months post transplant   8-10    >6 months post transplant    6-8  Adult    0-3 months post transplant   10-12    3-6 months post transplant   8-10    >6 months post transplant    6-8  Pancreas Transplant    0-6 months post transplant   8-10    >6 months post transplant    5-8*     Recent Labs   Lab Test 05/15/17  0741 10/04/18  0820 10/15/18  0748   DOSMPA 8p 5.14.2017 Not Provided Not Provided   MPACID 1.20 0.97* 1.10   MPAG 51.8 70.7 76.6     Scribe Disclosure:  I, Kaelyn Gomez, am serving as a scribe to document services personally performed by Doyle Timmons M.D. at this visit, based upon the provider's statements to me. All documentation has been reviewed by the aforementioned provider prior to being entered into the official medical record.

## 2020-01-09 ASSESSMENT — ACTIVITIES OF DAILY LIVING (ADL)
ADL_SUBSCALE_SCORE: 89.7
ADL_MEAN: .41
ADL_SUM: 7

## 2020-01-14 DIAGNOSIS — Z94.4 LIVER REPLACED BY TRANSPLANT (H): ICD-10-CM

## 2020-01-14 DIAGNOSIS — Z94.4 LIVER TRANSPLANTED (H): ICD-10-CM

## 2020-01-14 DIAGNOSIS — M25.552 LEFT HIP PAIN: ICD-10-CM

## 2020-01-14 DIAGNOSIS — Z96.641 STATUS POST HIP REPLACEMENT, RIGHT: Primary | ICD-10-CM

## 2020-01-14 RX ORDER — SIROLIMUS 1 MG/1
1 TABLET, FILM COATED ORAL DAILY
Qty: 90 TABLET | Refills: 3 | Status: SHIPPED | OUTPATIENT
Start: 2020-01-14 | End: 2020-06-16

## 2020-01-15 ENCOUNTER — ANCILLARY PROCEDURE (OUTPATIENT)
Dept: GENERAL RADIOLOGY | Facility: CLINIC | Age: 50
End: 2020-01-15
Attending: ORTHOPAEDIC SURGERY
Payer: COMMERCIAL

## 2020-01-15 ENCOUNTER — ANCILLARY PROCEDURE (OUTPATIENT)
Dept: ULTRASOUND IMAGING | Facility: CLINIC | Age: 50
End: 2020-01-15
Attending: INTERNAL MEDICINE
Payer: COMMERCIAL

## 2020-01-15 ENCOUNTER — OFFICE VISIT (OUTPATIENT)
Dept: ORTHOPEDICS | Facility: CLINIC | Age: 50
End: 2020-01-15
Payer: COMMERCIAL

## 2020-01-15 DIAGNOSIS — M25.552 LEFT HIP PAIN: ICD-10-CM

## 2020-01-15 DIAGNOSIS — D75.1 RELATIVE ERYTHROCYTOSIS: ICD-10-CM

## 2020-01-15 DIAGNOSIS — Z96.641 STATUS POST HIP REPLACEMENT, RIGHT: Primary | ICD-10-CM

## 2020-01-15 DIAGNOSIS — Z96.641 STATUS POST HIP REPLACEMENT, RIGHT: ICD-10-CM

## 2020-01-15 NOTE — PROGRESS NOTES
Interval History:   Jarod Quesada is a 48 year old male who is here follow up for:   Right ROBERTA - 10/30/2018    Jarod is now a little over 1 year out from the total hip replacement.  He notes that things had been going extremely well.  A few months ago he did start to have a nagging type pain in the right thigh.  He states the symptoms are different than before surgery.  They are in the thigh.  They are worse when he has been standing for a prolonged period of time.  He does not describe any start up type symptoms.  He has not had any problems with his incision.  He has not been doing any strengthening or stretching.  He has returned to working full-time.  Otherwise his general health is very good and he had a recent liver kidney transplant checkup and it was noted that things were going very well from that standpoint.       Physical Exam:     NAD  AOx3  Interactive and cooperative with the exam.  He has no pain with right hip range of motion.  He walks with a well-balanced gait.  His incision is well-healed.  There is no residual rash.  He has no lateral hip tenderness to palpation.         Imaging:      X-rays demonstrate well fixed well-positioned right cementless total hip replacement.  When compared to the prior films there may be a slight laterally based pedestal.  Otherwise there is no lucency or other concerning findings for loosening.       Assessment and Plan:      Jarod has done very well following the right hip replacement.  We reviewed possible etiologies including loosening or infection.  I have low concern for infection given the mild symptoms.  His symptoms are not classic for loosening.  However there is a pedestal and we reviewed that finding on the x-rays today.  I am hopeful that this is muscular in nature and will improve with some physical therapy and strengthening and stretching which I would guess.  If however his symptoms are persistent over the next 6 months he would return to  clinic and we would repeat the imaging.  Otherwise if his symptoms are worsening in the meantime he will also let us know.  He will let us know if he has any questions or concerns in the meantime.  Nima Scott M.D.     Arthritis and Joint Replacement  Department of Orthopaedic Surgery, [facility]  [email or web address]  [phone] (pager)

## 2020-01-15 NOTE — LETTER
1/15/2020       RE: Jarod Quesada  [address] [place] [address]     Dear Colleague,    Thank you for referring your patient, Jarod Quesada, to the [facility] at [facility]. Please see a copy of my visit note below.         Interval History:   Jarod Quesada is a 48 year old male who is here follow up for:   Right ROBERTA - 10/30/2018    Jarod is now a little over 1 year out from the total hip replacement.  He notes that things had been going extremely well.  A few months ago he did start to have a nagging type pain in the right thigh.  He states the symptoms are different than before surgery.  They are in the thigh.  They are worse when he has been standing for a prolonged period of time.  He does not describe any start up type symptoms.  He has not had any problems with his incision.  He has not been doing any strengthening or stretching.  He has returned to working full-time.  Otherwise his general health is very good and he had a recent liver kidney transplant checkup and it was noted that things were going very well from that standpoint.         Physical Exam:     NAD  AOx3  Interactive and cooperative with the exam.  He has no pain with right hip range of motion.  He walks with a well-balanced gait.  His incision is well-healed.  There is no residual rash.  He has no lateral hip tenderness to palpation.       Imaging:      X-rays demonstrate well fixed well-positioned right cementless total hip replacement.  When compared to the prior films there may be a slight laterally based pedestal.  Otherwise there is no lucency or other concerning findings for loosening.       Assessment and Plan:      Jarod has done very well following the right hip replacement.  We reviewed possible etiologies including loosening or infection.  I have low concern for infection given the mild symptoms.  His symptoms are not classic for loosening.  However  there is a pedestal and we reviewed that finding on the x-rays today.  I am hopeful that this is muscular in nature and will improve with some physical therapy and strengthening and stretching which I would guess.  If however his symptoms are persistent over the next 6 months he would return to clinic and we would repeat the imaging.  Otherwise if his symptoms are worsening in the meantime he will also let us know.  He will let us know if he has any questions or concerns in the meantime.    Nima Scott M.D.     Arthritis and Joint Replacement  Department of Orthopaedic Surgery, [facility]  [email or web address]  [phone] (pager)

## 2020-01-20 NOTE — TELEPHONE ENCOUNTER
Internal Medicine Per Dr. Nima Scott, Jarod is scheduled for hip replacement on 10/1/2018.      Our plan for immunosuppression will be as follows:   On 10/1 Jarod will start gengraf (Cyclosporine) 150 mg po bid.  He will stay on Sirolimus 1 mg po qd 10/1-10/3.  He will decrease Sirolimus to 0.5 mg on 10/4- 10/7, stop sirolimus on 10/8.  He will need a lab draw w/ Cyclosporine level on 10/4 and then weekly on mondays after that x 4 w/ all liver labs as well.  He will stay on the cyclosporine / cellcept regimen minimally for 4 weeks post hip replacement or longer until wound is determined to be healed by Dr. Scott.  When wound is healed I will ask Jarod to call me regarding restarting Sirolimus.  We will communicate this to Jarod and order medication ahead of time to assure that he can start on time.    Message sent to Jarod via Etable w/ this information.

## 2020-02-23 ENCOUNTER — HEALTH MAINTENANCE LETTER (OUTPATIENT)
Age: 50
End: 2020-02-23

## 2020-02-25 DIAGNOSIS — Z94.4 LIVER REPLACED BY TRANSPLANT (H): ICD-10-CM

## 2020-02-25 DIAGNOSIS — Z13.220 LIPID SCREENING: ICD-10-CM

## 2020-02-25 LAB
ALBUMIN SERPL-MCNC: 3.7 G/DL (ref 3.4–5)
ALP SERPL-CCNC: 58 U/L (ref 40–150)
ALT SERPL W P-5'-P-CCNC: 23 U/L (ref 0–70)
ANION GAP SERPL CALCULATED.3IONS-SCNC: 6 MMOL/L (ref 3–14)
AST SERPL W P-5'-P-CCNC: 18 U/L (ref 0–45)
BILIRUB DIRECT SERPL-MCNC: 0.2 MG/DL (ref 0–0.2)
BILIRUB SERPL-MCNC: 1.4 MG/DL (ref 0.2–1.3)
BUN SERPL-MCNC: 16 MG/DL (ref 7–30)
CALCIUM SERPL-MCNC: 9 MG/DL (ref 8.5–10.1)
CHLORIDE SERPL-SCNC: 107 MMOL/L (ref 94–109)
CO2 SERPL-SCNC: 27 MMOL/L (ref 20–32)
CREAT SERPL-MCNC: 1.22 MG/DL (ref 0.66–1.25)
ERYTHROCYTE [DISTWIDTH] IN BLOOD BY AUTOMATED COUNT: 13 % (ref 10–15)
GFR SERPL CREATININE-BSD FRML MDRD: 69 ML/MIN/{1.73_M2}
GLUCOSE SERPL-MCNC: 87 MG/DL (ref 70–99)
HCT VFR BLD AUTO: 47.6 % (ref 40–53)
HGB BLD-MCNC: 15.5 G/DL (ref 13.3–17.7)
MCH RBC QN AUTO: 26.8 PG (ref 26.5–33)
MCHC RBC AUTO-ENTMCNC: 32.6 G/DL (ref 31.5–36.5)
MCV RBC AUTO: 82 FL (ref 78–100)
PLATELET # BLD AUTO: 212 10E9/L (ref 150–450)
POTASSIUM SERPL-SCNC: 3.6 MMOL/L (ref 3.4–5.3)
PROT SERPL-MCNC: 6.9 G/DL (ref 6.8–8.8)
RBC # BLD AUTO: 5.78 10E12/L (ref 4.4–5.9)
SIROLIMUS BLD-MCNC: 3.3 UG/L (ref 5–15)
SODIUM SERPL-SCNC: 139 MMOL/L (ref 133–144)
TME LAST DOSE: ABNORMAL H
WBC # BLD AUTO: 4.8 10E9/L (ref 4–11)

## 2020-02-28 DIAGNOSIS — Z94.0 KIDNEY REPLACED BY TRANSPLANT: ICD-10-CM

## 2020-02-28 RX ORDER — MYCOPHENOLATE MOFETIL 250 MG/1
750 CAPSULE ORAL 2 TIMES DAILY
Qty: 540 CAPSULE | Refills: 3 | Status: SHIPPED | OUTPATIENT
Start: 2020-02-28 | End: 2020-02-28

## 2020-02-28 RX ORDER — MYCOPHENOLATE MOFETIL 250 MG/1
750 CAPSULE ORAL 2 TIMES DAILY
Qty: 540 CAPSULE | Refills: 3 | Status: SHIPPED | OUTPATIENT
Start: 2020-02-28 | End: 2020-06-16

## 2020-02-28 NOTE — TELEPHONE ENCOUNTER
Provider Call: Medication Refill  Route to LPN  Pharmacy Name:  [facility] [place]   Pharmacy Location: [address]  Name of Medication: mycophenolate (GENERIC EQUIVALENT) 250 MG capsule ()  When will the patient be out of this medication?: Greater than 3 days (Route standard priority)  Callback needed? No'

## 2020-06-11 DIAGNOSIS — Z94.4 LIVER REPLACED BY TRANSPLANT (H): ICD-10-CM

## 2020-06-11 DIAGNOSIS — Z13.220 LIPID SCREENING: ICD-10-CM

## 2020-06-11 LAB
ALBUMIN SERPL-MCNC: 3.7 G/DL (ref 3.4–5)
ALP SERPL-CCNC: 57 U/L (ref 40–150)
ALT SERPL W P-5'-P-CCNC: 30 U/L (ref 0–70)
ANION GAP SERPL CALCULATED.3IONS-SCNC: 4 MMOL/L (ref 3–14)
AST SERPL W P-5'-P-CCNC: 17 U/L (ref 0–45)
BILIRUB DIRECT SERPL-MCNC: 0.3 MG/DL (ref 0–0.2)
BILIRUB SERPL-MCNC: 1.3 MG/DL (ref 0.2–1.3)
BUN SERPL-MCNC: 19 MG/DL (ref 7–30)
CALCIUM SERPL-MCNC: 8.9 MG/DL (ref 8.5–10.1)
CHLORIDE SERPL-SCNC: 107 MMOL/L (ref 94–109)
CHOLEST SERPL-MCNC: 172 MG/DL
CO2 SERPL-SCNC: 27 MMOL/L (ref 20–32)
CREAT SERPL-MCNC: 1.37 MG/DL (ref 0.66–1.25)
ERYTHROCYTE [DISTWIDTH] IN BLOOD BY AUTOMATED COUNT: 13.1 % (ref 10–15)
GFR SERPL CREATININE-BSD FRML MDRD: 60 ML/MIN/{1.73_M2}
GLUCOSE SERPL-MCNC: 87 MG/DL (ref 70–99)
HCT VFR BLD AUTO: 46.9 % (ref 40–53)
HDLC SERPL-MCNC: 54 MG/DL
HGB BLD-MCNC: 15.4 G/DL (ref 13.3–17.7)
LDLC SERPL CALC-MCNC: 102 MG/DL
MCH RBC QN AUTO: 27.4 PG (ref 26.5–33)
MCHC RBC AUTO-ENTMCNC: 32.8 G/DL (ref 31.5–36.5)
MCV RBC AUTO: 84 FL (ref 78–100)
NONHDLC SERPL-MCNC: 118 MG/DL
PLATELET # BLD AUTO: 205 10E9/L (ref 150–450)
POTASSIUM SERPL-SCNC: 4.2 MMOL/L (ref 3.4–5.3)
PROT SERPL-MCNC: 7.1 G/DL (ref 6.8–8.8)
RBC # BLD AUTO: 5.62 10E12/L (ref 4.4–5.9)
SIROLIMUS BLD-MCNC: 3.9 UG/L (ref 5–15)
SODIUM SERPL-SCNC: 139 MMOL/L (ref 133–144)
TME LAST DOSE: ABNORMAL H
TRIGL SERPL-MCNC: 83 MG/DL
WBC # BLD AUTO: 4.9 10E9/L (ref 4–11)

## 2020-06-16 ENCOUNTER — MYC REFILL (OUTPATIENT)
Dept: TRANSPLANT | Facility: CLINIC | Age: 50
End: 2020-06-16

## 2020-06-16 DIAGNOSIS — Z94.4 LIVER TRANSPLANTED (H): ICD-10-CM

## 2020-06-16 DIAGNOSIS — Z94.0 KIDNEY REPLACED BY TRANSPLANT: ICD-10-CM

## 2020-06-16 DIAGNOSIS — Z94.4 LIVER REPLACED BY TRANSPLANT (H): ICD-10-CM

## 2020-06-16 RX ORDER — MYCOPHENOLATE MOFETIL 250 MG/1
750 CAPSULE ORAL 2 TIMES DAILY
Qty: 540 CAPSULE | Refills: 3 | Status: SHIPPED | OUTPATIENT
Start: 2020-06-16 | End: 2021-06-29

## 2020-06-16 RX ORDER — SIROLIMUS 1 MG/1
1 TABLET, FILM COATED ORAL DAILY
Qty: 90 TABLET | Refills: 3 | Status: SHIPPED | OUTPATIENT
Start: 2020-06-16 | End: 2021-06-18 | Stop reason: DRUGHIGH

## 2020-06-22 ENCOUNTER — TELEPHONE (OUTPATIENT)
Dept: TRANSPLANT | Facility: CLINIC | Age: 50
End: 2020-06-22

## 2020-06-22 DIAGNOSIS — Z94.0 HTN, KIDNEY TRANSPLANT RELATED: ICD-10-CM

## 2020-06-22 DIAGNOSIS — I15.1 HTN, KIDNEY TRANSPLANT RELATED: ICD-10-CM

## 2020-06-22 NOTE — TELEPHONE ENCOUNTER
Patient Call: Voicemail  Date/Time: 06/22/2020 at 10:59AM  Reason for call: Please call Jarod tate  Unable to send message via N4MD

## 2020-06-23 RX ORDER — LOSARTAN POTASSIUM 100 MG/1
100 TABLET ORAL AT BEDTIME
Qty: 90 TABLET | Refills: 3 | Status: SHIPPED | OUTPATIENT
Start: 2020-06-23 | End: 2021-06-29

## 2020-06-23 NOTE — TELEPHONE ENCOUNTER
Losartan was sent to [facility] 6/16.  Spoke to patient, preferred to be sent to  speciality.  Sent prescription there.  Delaney Meyer LPN  Nephrology  [phone]

## 2020-08-28 DIAGNOSIS — Z13.220 LIPID SCREENING: ICD-10-CM

## 2020-08-28 DIAGNOSIS — Z94.4 LIVER REPLACED BY TRANSPLANT (H): ICD-10-CM

## 2020-09-18 ENCOUNTER — DOCUMENTATION ONLY (OUTPATIENT)
Dept: TRANSPLANT | Facility: CLINIC | Age: 50
End: 2020-09-18

## 2020-11-03 ENCOUNTER — DOCUMENTATION ONLY (OUTPATIENT)
Dept: CARE COORDINATION | Facility: CLINIC | Age: 50
End: 2020-11-03

## 2020-12-31 DIAGNOSIS — Z94.4 LIVER REPLACED BY TRANSPLANT (H): ICD-10-CM

## 2020-12-31 DIAGNOSIS — Z13.220 LIPID SCREENING: ICD-10-CM

## 2020-12-31 LAB
ALBUMIN SERPL-MCNC: 3.8 G/DL (ref 3.4–5)
ALBUMIN UR-MCNC: NEGATIVE MG/DL
ALP SERPL-CCNC: 63 U/L (ref 40–150)
ALT SERPL W P-5'-P-CCNC: 54 U/L (ref 0–70)
ANION GAP SERPL CALCULATED.3IONS-SCNC: 5 MMOL/L (ref 3–14)
APPEARANCE UR: ABNORMAL
AST SERPL W P-5'-P-CCNC: 30 U/L (ref 0–45)
BILIRUB DIRECT SERPL-MCNC: 0.3 MG/DL (ref 0–0.2)
BILIRUB SERPL-MCNC: 1.4 MG/DL (ref 0.2–1.3)
BILIRUB UR QL STRIP: NEGATIVE
BUN SERPL-MCNC: 15 MG/DL (ref 7–30)
CALCIUM SERPL-MCNC: 8.8 MG/DL (ref 8.5–10.1)
CHLORIDE SERPL-SCNC: 107 MMOL/L (ref 94–109)
CO2 SERPL-SCNC: 27 MMOL/L (ref 20–32)
COLOR UR AUTO: YELLOW
CREAT SERPL-MCNC: 1.4 MG/DL (ref 0.66–1.25)
CREAT UR-MCNC: 196 MG/DL
ERYTHROCYTE [DISTWIDTH] IN BLOOD BY AUTOMATED COUNT: 13 % (ref 10–15)
GFR SERPL CREATININE-BSD FRML MDRD: 58 ML/MIN/{1.73_M2}
GLUCOSE SERPL-MCNC: 90 MG/DL (ref 70–99)
GLUCOSE UR STRIP-MCNC: NEGATIVE MG/DL
HCT VFR BLD AUTO: 48.2 % (ref 40–53)
HGB BLD-MCNC: 15.5 G/DL (ref 13.3–17.7)
HGB UR QL STRIP: NEGATIVE
KETONES UR STRIP-MCNC: NEGATIVE MG/DL
LEUKOCYTE ESTERASE UR QL STRIP: NEGATIVE
MCH RBC QN AUTO: 27.3 PG (ref 26.5–33)
MCHC RBC AUTO-ENTMCNC: 32.2 G/DL (ref 31.5–36.5)
MCV RBC AUTO: 85 FL (ref 78–100)
MUCOUS THREADS #/AREA URNS LPF: PRESENT /LPF
NITRATE UR QL: NEGATIVE
PH UR STRIP: 5 PH (ref 5–7)
PLATELET # BLD AUTO: 212 10E9/L (ref 150–450)
POTASSIUM SERPL-SCNC: 4.2 MMOL/L (ref 3.4–5.3)
PROT SERPL-MCNC: 6.9 G/DL (ref 6.8–8.8)
PROT UR-MCNC: 0.26 G/L
PROT/CREAT 24H UR: 0.14 G/G CR (ref 0–0.2)
RBC # BLD AUTO: 5.68 10E12/L (ref 4.4–5.9)
RBC #/AREA URNS AUTO: 1 /HPF (ref 0–2)
SIROLIMUS BLD-MCNC: 7.5 UG/L (ref 5–15)
SODIUM SERPL-SCNC: 139 MMOL/L (ref 133–144)
SOURCE: ABNORMAL
SP GR UR STRIP: 1.02 (ref 1–1.03)
SQUAMOUS #/AREA URNS AUTO: <1 /HPF (ref 0–1)
TME LAST DOSE: NORMAL H
UROBILINOGEN UR STRIP-MCNC: 0 MG/DL (ref 0–2)
WBC # BLD AUTO: 5.9 10E9/L (ref 4–11)
WBC #/AREA URNS AUTO: 1 /HPF (ref 0–5)

## 2020-12-31 PROCEDURE — 36415 COLL VENOUS BLD VENIPUNCTURE: CPT | Performed by: PATHOLOGY

## 2020-12-31 PROCEDURE — 99000 SPECIMEN HANDLING OFFICE-LAB: CPT | Performed by: PATHOLOGY

## 2020-12-31 PROCEDURE — 80195 ASSAY OF SIROLIMUS: CPT | Performed by: PATHOLOGY

## 2020-12-31 PROCEDURE — 80048 BASIC METABOLIC PNL TOTAL CA: CPT | Performed by: PATHOLOGY

## 2020-12-31 PROCEDURE — 84156 ASSAY OF PROTEIN URINE: CPT | Performed by: PATHOLOGY

## 2020-12-31 PROCEDURE — 85027 COMPLETE CBC AUTOMATED: CPT | Performed by: PATHOLOGY

## 2020-12-31 PROCEDURE — 81001 URINALYSIS AUTO W/SCOPE: CPT | Performed by: PATHOLOGY

## 2020-12-31 PROCEDURE — 80076 HEPATIC FUNCTION PANEL: CPT | Performed by: PATHOLOGY

## 2021-01-05 ENCOUNTER — VIRTUAL VISIT (OUTPATIENT)
Dept: NEPHROLOGY | Facility: CLINIC | Age: 51
End: 2021-01-05
Attending: INTERNAL MEDICINE
Payer: COMMERCIAL

## 2021-01-05 ENCOUNTER — VIRTUAL VISIT (OUTPATIENT)
Dept: GASTROENTEROLOGY | Facility: CLINIC | Age: 51
End: 2021-01-05
Attending: INTERNAL MEDICINE
Payer: COMMERCIAL

## 2021-01-05 DIAGNOSIS — E55.9 VITAMIN D DEFICIENCY: ICD-10-CM

## 2021-01-05 DIAGNOSIS — Z48.298 AFTERCARE FOLLOWING ORGAN TRANSPLANT: ICD-10-CM

## 2021-01-05 DIAGNOSIS — I15.1 HTN, KIDNEY TRANSPLANT RELATED: Primary | ICD-10-CM

## 2021-01-05 DIAGNOSIS — N18.31 STAGE 3A CHRONIC KIDNEY DISEASE (H): ICD-10-CM

## 2021-01-05 DIAGNOSIS — Z94.4 LIVER REPLACED BY TRANSPLANT (H): ICD-10-CM

## 2021-01-05 DIAGNOSIS — D84.9 IMMUNOSUPPRESSION (H): ICD-10-CM

## 2021-01-05 DIAGNOSIS — Z94.0 HTN, KIDNEY TRANSPLANT RELATED: Primary | ICD-10-CM

## 2021-01-05 DIAGNOSIS — Z94.0 KIDNEY REPLACED BY TRANSPLANT: ICD-10-CM

## 2021-01-05 DIAGNOSIS — D75.1 POST-TRANSPLANT ERYTHROCYTOSIS: ICD-10-CM

## 2021-01-05 DIAGNOSIS — Z94.4 LIVER REPLACED BY TRANSPLANT (H): Primary | ICD-10-CM

## 2021-01-05 PROBLEM — N18.30 CHRONIC KIDNEY DISEASE, STAGE 3 (H): Status: ACTIVE | Noted: 2021-01-05

## 2021-01-05 PROCEDURE — 99214 OFFICE O/P EST MOD 30 MIN: CPT | Mod: 95

## 2021-01-05 PROCEDURE — 99214 OFFICE O/P EST MOD 30 MIN: CPT | Mod: 95 | Performed by: INTERNAL MEDICINE

## 2021-01-05 RX ORDER — AMLODIPINE BESYLATE 5 MG/1
5 TABLET ORAL DAILY
Qty: 90 TABLET | Refills: 0 | Status: SHIPPED | OUTPATIENT
Start: 2021-01-05 | End: 2021-04-02

## 2021-01-05 RX ORDER — ASPIRIN 81 MG/1
81 TABLET, CHEWABLE ORAL DAILY
COMMUNITY

## 2021-01-05 ASSESSMENT — PAIN SCALES - GENERAL: PAINLEVEL: NO PAIN (0)

## 2021-01-05 NOTE — PROGRESS NOTES
"Jarod Quesada is a 50 year old male who is being evaluated via a billable video visit.      How would you like to obtain your AVS? MyChart    Will anyone else be joining your video visit? No  {If patient encounters technical issues they should call [phone] :[id number]}    Video Start Time: {video visit start/end time for provider to select:340359}  {PROVIDER CHARTING PREFERENCE:877004}    Subjective     Jarod Quesada is a 50 year old male who presents to clinic today for the following health issues {ACCOMPANIED BY STATEMENT (Optional):490238}    HPI       ***    Review of Systems   {ROS COMP (Optional):099293}      Objective           Vitals:  No vitals were obtained today due to virtual visit.    Physical Exam   {video visit exam brief selected:245146::\"GENERAL: Healthy, alert and no distress\",\"EYES: Eyes grossly normal to inspection.  No discharge or erythema, or obvious scleral/conjunctival abnormalities.\",\"RESP: No audible wheeze, cough, or visible cyanosis.  No visible retractions or increased work of breathing.  \",\"SKIN: Visible skin clear. No significant rash, abnormal pigmentation or lesions.\",\"NEURO: Cranial nerves grossly intact.  Mentation and speech appropriate for age.\",\"PSYCH: Mentation appears normal, affect normal/bright, judgement and insight intact, normal speech and appearance well-groomed.\"}    {Diagnostic Test Results (Optional):076054}    {AMBULATORY ATTESTATION (Optional):090086}        Video-Visit Details    Type of service:  Video Visit    Video End Time:{video visit start/end time for provider to select:646818}    Originating Location (pt. Location): {video visit patient location:223565::\"Home\"}    Distant Location (provider location):  [facility] [place]     Platform used for Video Visit: {Virtual Visit Platforms:157814::\"Fluid-1\"}      "

## 2021-01-05 NOTE — LETTER
1/5/2021      RE: Jarod Quesada  [address] [place] [address]       Jarod Quesada is a 50 year old male who is being evaluated via a billable video visit.      How would you like to obtain your AVS? Mail a copy  If the video visit is dropped, the invitation should be resent by: Send to e-mail at: [email or web address]  Will anyone else be joining your video visit? No    Video-Visit Details    Type of service:  Video Visit    Video Start Time: 1:04 PM    Video End Time:1:39 PM    Originating Location (pt. Location): Home    Distant Location (provider location):  [facility] [place]     Platform used for Video Visit: [employer]       CHRONIC TRANSPLANT NEPHROLOGY VISIT    Assessment & Plan   # DDKT (SLK): Stable   - Baseline Creatinine:  ~ 1.2 - 1.4   - Proteinuria: Normal (<0.2 grams)   - Date DSA Last Checked: Jan/2017      Latest DSA: No   - BK Viremia: Not checked recently due to time from transplant   - Kidney Tx Biopsy: No    # Liver Tx (K): Stable, liver function appears to be stable, T.deysi and INR on higher end of normal. Follows with transplant hepatology.     # Immunosuppression: Mycophenolate mofetil (dose 750 mg every 12 hours) and Sirolimus (goal 4-6)          - Continue with intensive monitoring of immunosuppression for efficacy and toxicity.          - Changes: No    # Infection Prophylaxis:   - PJP: None    # Hypertension: Borderline control;  Goal BP: < 130/80   - Changes: Yes - BP runs mostly 150 - 130 systolic. He is currently on Losartan 100 mg daily, will add amlodipine 5 mg daily. Conisder adding HCTZ or chlorthalidone if BP is not controlled in 2 weeks.     # Blood glucose : will check hemoglobin A1C    # Hemoglobin : normal , previous work up for possible erythrocytosis (US native kidney with no masses), on ARB    # Mineral Bone Disorder:    - PTH not checked recently  - Vitamin D; level: Not checked recently        On supplement: Yes  -  Calcium; level: Normal        On supplement: No   - Will check PTH and vitamin D.     # Overweight: he lost 15 lb since last visit.    - Recommend weight loss for overall health by increasing exercise and watching caloric intake.    # Skin Cancer Risk:    - Discussed sun protection and recommend regular follow up with Dermatology.    # Medical Compliance: Yes    # Transplant History:  Etiology of Kidney Failure: Hepatorenal syndrome (HRS)  Tx: DDKT (SLK)  Transplant: 1/14/2013 (Kidney), 1/14/2013 (Liver)  Donor Type: Donation after Brain Death Donor Class: Standard Criteria Donor  Significant transplant-related complications: None    Transplant Office Phone Number: [phone]    Assessment and plan was discussed with the patient and he voiced his understanding and agreement.    Return visit: Return in about 1 year (around 1/5/2022).    Go Anderson MD   Attestation:  This patient has been seen and evaluated by me, Doyle Timmons MD.  I have reviewed the note and agree with plan of care as documented by the fellow.       Chief Complaint   Mr. Cherelle Quesada is a 50 year old here for kidney transplant and immunosuppression management.    History of Present Illness    He denies any significant medication side effects. He denies any N/V/D, fever or chills. He lost 15 lb since last visit. His BP remains uncontrolled with most SBP readings in 150-130 range, currently he takes 100 mg daily losartan. No night sweats fatigue or change in energy level.     Recent Hospitalizations:  [x] No [] Yes    New Medical Issues: [x] No [] Yes    Decreased energy: [x] No [] Yes    Chest pain or SOB with exertion:  [x] No [] Yes    Appetite change or weight change: [] No [x] Yes He lost 15 lb since last visit   Nausea, vomiting or diarrhea:  [x] No [] Yes    Fever, sweats or chills: [x] No [] Yes    Leg swelling: [x] No [] Yes      Home BP: 150-130 SBP    Review of Systems   A comprehensive review of systems was obtained  and negative, except as noted in the HPI or PMH.    Problem List   Patient Active Problem List   Diagnosis     Generalized anxiety disorder     Kidney replaced by transplant     Liver replaced by transplant (H)     Immunosuppression (H)     HTN, kidney transplant related     Secondary renal hyperparathyroidism (H)     Aftercare following organ transplant     Status post hip replacement, right     Vitamin D deficiency     Post-transplant erythrocytosis     Chronic kidney disease, stage 3       Social History   Social History     Tobacco Use     Smoking status: Never Smoker     Smokeless tobacco: Never Used   Substance Use Topics     Alcohol use: No     Comment: Occasional, rare     Drug use: No       Allergies   Allergies   Allergen Reactions     Paxil [Paroxetine] Other (See Comments)     Caused Severe Tremor     Cepacol Maximum Strength Rash     Chlorhexidine Rash     Phenol Rash       Medications   Current Outpatient Medications   Medication Sig     aspirin (ASA) 81 MG chewable tablet Take 81 mg by mouth daily     gabapentin (NEURONTIN) 100 MG capsule Take 6 capsules (600 mg) by mouth in the morning, take 5 capsules (500 mg) by mouth in the evening.     losartan (COZAAR) 100 MG tablet Take 1 tablet (100 mg) by mouth At Bedtime     multivitamin, therapeutic with minerals (MULTI-VITAMIN) TABS tablet Take 1 tablet by mouth daily     mycophenolate (GENERIC EQUIVALENT) 250 MG capsule Take 3 capsules (750 mg) by mouth 2 times daily     sildenafil (VIAGRA) 50 MG tablet Take 0.5-1 tablets (25-50 mg) by mouth daily as needed for erectile dysfunction Take 30 min to 4 hours before intercourse.  Never use with nitroglycerin, terazosin or doxazosin.     sirolimus (GENERIC EQUIVALENT) 1 MG tablet Take 1 tablet (1 mg) by mouth daily     CHOLECALCIFEROL PO Take 5,000 Units by mouth daily      No current facility-administered medications for this visit.      Medications Discontinued During This Encounter   Medication Reason      amLODIPine (NORVASC) 5 MG tablet      mometasone (ELOCON) 0.1 % ointment        Physical Exam   GENERAL APPEARANCE: alert and no distress  HENT: no obvious abnormalities on appearance  RESP: breathing appears unremarkable with normal rate, no audible wheezing or cough and no apparent shortness of breath with conversation  MS: extremities normal - no gross deformities noted  SKIN: no apparent rash and normal skin tone  NEURO: speech is clear with no obvious neurological deficits  PSYCH: mentation appears normal and affect normal        Data     Renal Latest Ref Rng & Units 12/31/2020 6/11/2020 2/25/2020   Na 133 - 144 mmol/L 139 139 139   Na (external) 135 - 145 mmol/L - - -   K 3.4 - 5.3 mmol/L 4.2 4.2 3.6   K (external) 3.5 - 5.0 mmol/L - - -   Cl 94 - 109 mmol/L 107 107 107   Cl (external) 98 - 110 mmol/L - - -   CO2 20 - 32 mmol/L 27 27 27   CO2 (external) 21 - 31 mmol/L - - -   BUN 7 - 30 mg/dL 15 19 16   BUN (external) 8 - 25 mg/dL - - -   Cr 0.66 - 1.25 mg/dL 1.40(H) 1.37(H) 1.22   Cr (external) 0.72 - 1.25 mg/dL - - -   Glucose 70 - 99 mg/dL 90 87 87   Glucose (external) 65 - 100 mg/dL - - -   Ca  8.5 - 10.1 mg/dL 8.8 8.9 9.0   Ca (external) 8.5 - 10.5 mg/dL - - -   Mg 1.6 - 2.3 mg/dL - - -   Mg (external) 1.6 - 2.6 mg/dL - - -     Bone Health Latest Ref Rng & Units 7/24/2015 6/1/2015 1/22/2015   Phos 2.5 - 4.5 mg/dL 2.4(L) 3.1 1.9(L)   Phos (external) 2.3 - 4.7 mg/dL - - -   PTHi 12 - 72 pg/mL - - -     Heme Latest Ref Rng & Units 12/31/2020 6/11/2020 2/25/2020   WBC 4.0 - 11.0 10e9/L 5.9 4.9 4.8   WBC (external) 4.5 - 11.0 thou/cu mm - - -   Hgb 13.3 - 17.7 g/dL 15.5 15.4 15.5   Hgb (external) 13.5 - 17.5 g/dL - - -   Plt 150 - 450 10e9/L 212 205 212   Plt (external) 140 - 440 thou/cu mm - - -   ABSOLUTE NEUTROPHIL 1.6 - 8.3 10e9/L - - -   ABSOLUTE NEUTROPHILS (EXTERNAL) 1.7 - 7.0 thou/cu mm - - -   ABSOLUTE LYMPHOCYTES 0.8 - 5.3 10e9/L - - -   ABSOLUTE LYMPHOCYTES (EXTERNAL) 0.9 - 2.9 thou/cu mm - - -    ABSOLUTE MONOCYTES 0.0 - 1.3 10e9/L - - -   ABSOLUTE MONOCYTES (EXTERNAL) <0.9 thou/cu mm - - -   ABSOLUTE EOSINOPHILS 0.0 - 0.7 10e9/L - - -   ABSOLUTE EOSINOPHILS (EXTERNAL) <0.5 thou/cu mm - - -   ABSOLUTE BASOPHILS 0.0 - 0.2 10e9/L - - -   ABSOLUTE BASOPHILS (EXTERNAL) <0.3 thou/cu mm - - -   ABS IMMATURE GRANULOCYTES 0 - 0.4 10e9/L - - -     Liver Latest Ref Rng & Units 12/31/2020 6/11/2020 2/25/2020   AP 40 - 150 U/L 63 57 58   AP (external) 50 - 136 U/L - - -   TBili 0.2 - 1.3 mg/dL 1.4(H) 1.3 1.4(H)   TBili (external) 0.2 - 1.2 mg/dL - - -   DBili 0.0 - 0.2 mg/dL 0.3(H) 0.3(H) 0.2   DBili (external) 0.1 - 0.5 mg/dL - - -   ALT 0 - 70 U/L 54 30 23   ALT (external) 8 - 45 U/L - - -   AST 0 - 45 U/L 30 17 18   AST (external) 2 - 40 U/L - - -   Tot Protein 6.8 - 8.8 g/dL 6.9 7.1 6.9   Tot Protein (external) 6.0 - 8.0 g/dL - - -   Albumin 3.4 - 5.0 g/dL 3.8 3.7 3.7   Albumin (external) 3.5 - 5.2 g/dL - - -     Pancreas Latest Ref Rng & Units 1/15/2013 1/13/2013 1/6/2013   Amylase 30 - 110 U/L <30(L) 59 116(H)   Lipase 20 - 250 U/L 28 - 263(H)     Iron studies Latest Ref Rng & Units 1/8/2013 10/3/2012   Iron 35 - 180 ug/dL 15(L) 82   Iron sat 15 - 46 % 12(L) 70(H)   Ferritin 20 - 300 ng/mL 500(H) -     [facility] Txp Virology Latest Ref Rng & Units 2/3/2014 1/21/2014 1/30/2013   CVM DNA Quant - - - Whole blood, EDTA anticoagulant   CMV Quant <100 Copies/mL - - <100  No CMV DNA detected.   CMV QT Log <2.0 Log copies/mL - - <2.0  The Cytomegalovirus DNA Quantitation assay is a real-time polymerase chain   reaction (PCR) utilizing analyte specific reagents manufactured by [employer]   [employer]. Analyte Specific Reagents (ASRs) are used in many laboratory   tests necessary for standard medical care and generally do not require FDA   approval.   This test was developed and its performance characteristics determined by   [facility].  It has not been   cleared or approved by the  [place] Food and Drug Administration.   BK Spec - - - Plasma, EDTA anticoagulant   BK Res <1000 copies/mL - - <1000   BK Log <3.0 Log copies/mL - - <3.0  The lower limit of detection for this assay is 1000 copies/mL.  Real-time TaqMan   PCR was performed using BK primers and probe for the detection of a 90 bp   portion of the  1 gene.  The performance characteristics were validated by   the [facility], [place].   It has not been cleared or approved by the [place]. Food and Drug Administration.   EBV VCA IGG ANTIBODY U/mL - - -   EBV VCA IGM ANTIBODY U/mL - - -   Hep B Core NEG - Negative -   Hep B Surf - 1.2 - -   HIV 1&2 NEG - - -        Recent Labs   Lab Test 08/12/13  0745 08/19/13  0755 09/09/13  0745   DOSTAC 2000, 08/11/13 08/18/13 2000 NTP   TACROL 5.9 <3.0 Tacrolimus Reference Range  Kidney Transplant  Pediatric                      ug/L    0-3 months post transplant   10-12    3-6 months post transplant   8-10    6-12 months post transplant  6-8    >12 months post transplant   4-7  Adult    0-6 months post transplant   8-10    6-12 months post transplant  6-8    >12 months post transplant   4-6    >5 years post transplant     3-5  Heart Transplant  Pediatric    0-12 months post transplant  10-15    >12 months post transplant   5-10  Adult    0-3 months post transplant   10-15    3-6 months post transplant   8-12    6-12 months post transplant  6-12    >12 months post transplant   6-10  Lung Transplant    0-12 months post transplant  10-15    >12 months post transplant   8-12  Liver Transplant  Pediatric    0-3 months post transplant   10-15    3-6 months post transplant   8-10    >6 months post transplant    6-8  Adult    0-3 months post transplant   10-12    3-6 months post transplant   8-10    >6 months post transplant    6-8  Pancreas Transplant    0-6 months post transplant   8-10    >6 months post transplant    5-8* <3.0 Tacrolimus Reference Range  Kidney Transplant  Pediatric                       ug/L    0-3 months post transplant   10-12    3-6 months post transplant   8-10    6-12 months post transplant  6-8    >12 months post transplant   4-7  Adult    0-6 months post transplant   8-10    6-12 months post transplant  6-8    >12 months post transplant   4-6    >5 years post transplant     3-5  Heart Transplant  Pediatric    0-12 months post transplant  10-15    >12 months post transplant   5-10  Adult    0-3 months post transplant   10-15    3-6 months post transplant   8-12    6-12 months post transplant  6-12    >12 months post transplant   6-10  Lung Transplant    0-12 months post transplant  10-15    >12 months post transplant   8-12  Liver Transplant  Pediatric    0-3 months post transplant   10-15    3-6 months post transplant   8-10    >6 months post transplant    6-8  Adult    0-3 months post transplant   10-12    3-6 months post transplant   8-10    >6 months post transplant    6-8  Pancreas Transplant    0-6 months post transplant   8-10    >6 months post transplant    5-8*     Recent Labs   Lab Test 05/15/17  0741 10/04/18  0820 10/15/18  0748   DOSMPA 8p 5.14.2017 Not Provided Not Provided   MPACID 1.20 0.97* 1.10   MPAG 51.8 70.7 76.6       Go Anderson MD on 1/5/2021 at 1:34 PM      Doyle Timmons MD

## 2021-01-05 NOTE — PROGRESS NOTES
Jarod Quesada is a 50 year old male who is being evaluated via a billable video visit.      How would you like to obtain your AVS? Mail a copy  If the video visit is dropped, the invitation should be resent by: Send to e-mail at: [email or web address]  Will anyone else be joining your video visit? No    Video-Visit Details    Type of service:  Video Visit    Video Start Time: 1:04 PM    Video End Time:1:39 PM    Originating Location (pt. Location): Home    Distant Location (provider location):  [facility] [place]     Platform used for Video Visit: [employer]       CHRONIC TRANSPLANT NEPHROLOGY VISIT    Assessment & Plan   # DDKT (SLK): Stable   - Baseline Creatinine:  ~ 1.2 - 1.4   - Proteinuria: Normal (<0.2 grams)   - Date DSA Last Checked: Jan/2017      Latest DSA: No   - BK Viremia: Not checked recently due to time from transplant   - Kidney Tx Biopsy: No    # Liver Tx (SLK): Stable, liver function appears to be stable, T.deysi and INR on higher end of normal. Follows with transplant hepatology.     # Immunosuppression: Mycophenolate mofetil (dose 750 mg every 12 hours) and Sirolimus (goal 4-6)          - Continue with intensive monitoring of immunosuppression for efficacy and toxicity.          - Changes: No    # Infection Prophylaxis:   - PJP: None    # Hypertension: Borderline control;  Goal BP: < 130/80   - Changes: Yes - BP runs mostly 150 - 130 systolic. He is currently on Losartan 100 mg daily, will add amlodipine 5 mg daily. Conisder adding HCTZ or chlorthalidone if BP is not controlled in 2 weeks.     # Blood glucose : will check hemoglobin A1C    # Hemoglobin : normal , previous work up for possible erythrocytosis (US native kidney with no masses), on ARB    # Mineral Bone Disorder:    - PTH not checked recently  - Vitamin D; level: Not checked recently        On supplement: Yes  - Calcium; level: Normal        On supplement: No   - Will check PTH and vitamin D.      # Overweight: he lost 15 lb since last visit.    - Recommend weight loss for overall health by increasing exercise and watching caloric intake.    # Skin Cancer Risk:    - Discussed sun protection and recommend regular follow up with Dermatology.    # Medical Compliance: Yes    # Transplant History:  Etiology of Kidney Failure: Hepatorenal syndrome (HRS)  Tx: DDKT (SLK)  Transplant: 1/14/2013 (Kidney), 1/14/2013 (Liver)  Donor Type: Donation after Brain Death Donor Class: Standard Criteria Donor  Significant transplant-related complications: None    Transplant Office Phone Number: [phone]    Assessment and plan was discussed with the patient and he voiced his understanding and agreement.    Return visit: Return in about 1 year (around 1/5/2022).    Go Anderson MD   Attestation:  This patient has been seen and evaluated by me, Doyle Timmons MD.  I have reviewed the note and agree with plan of care as documented by the fellow.       Chief Complaint   Mr. Cherelle Quesada is a 50 year old here for kidney transplant and immunosuppression management.    History of Present Illness    He denies any significant medication side effects. He denies any N/V/D, fever or chills. He lost 15 lb since last visit. His BP remains uncontrolled with most SBP readings in 150-130 range, currently he takes 100 mg daily losartan. No night sweats fatigue or change in energy level.     Recent Hospitalizations:  [x] No [] Yes    New Medical Issues: [x] No [] Yes    Decreased energy: [x] No [] Yes    Chest pain or SOB with exertion:  [x] No [] Yes    Appetite change or weight change: [] No [x] Yes He lost 15 lb since last visit   Nausea, vomiting or diarrhea:  [x] No [] Yes    Fever, sweats or chills: [x] No [] Yes    Leg swelling: [x] No [] Yes      Home BP: 150-130 SBP    Review of Systems   A comprehensive review of systems was obtained and negative, except as noted in the HPI or PMH.    Problem List   Patient Active  Problem List   Diagnosis     Generalized anxiety disorder     Kidney replaced by transplant     Liver replaced by transplant (H)     Immunosuppression (H)     HTN, kidney transplant related     Secondary renal hyperparathyroidism (H)     Aftercare following organ transplant     Status post hip replacement, right     Vitamin D deficiency     Post-transplant erythrocytosis     Chronic kidney disease, stage 3       Social History   Social History     Tobacco Use     Smoking status: Never Smoker     Smokeless tobacco: Never Used   Substance Use Topics     Alcohol use: No     Comment: Occasional, rare     Drug use: No       Allergies   Allergies   Allergen Reactions     Paxil [Paroxetine] Other (See Comments)     Caused Severe Tremor     Cepacol Maximum Strength Rash     Chlorhexidine Rash     Phenol Rash       Medications   Current Outpatient Medications   Medication Sig     aspirin (ASA) 81 MG chewable tablet Take 81 mg by mouth daily     gabapentin (NEURONTIN) 100 MG capsule Take 6 capsules (600 mg) by mouth in the morning, take 5 capsules (500 mg) by mouth in the evening.     losartan (COZAAR) 100 MG tablet Take 1 tablet (100 mg) by mouth At Bedtime     multivitamin, therapeutic with minerals (MULTI-VITAMIN) TABS tablet Take 1 tablet by mouth daily     mycophenolate (GENERIC EQUIVALENT) 250 MG capsule Take 3 capsules (750 mg) by mouth 2 times daily     sildenafil (VIAGRA) 50 MG tablet Take 0.5-1 tablets (25-50 mg) by mouth daily as needed for erectile dysfunction Take 30 min to 4 hours before intercourse.  Never use with nitroglycerin, terazosin or doxazosin.     sirolimus (GENERIC EQUIVALENT) 1 MG tablet Take 1 tablet (1 mg) by mouth daily     CHOLECALCIFEROL PO Take 5,000 Units by mouth daily      No current facility-administered medications for this visit.      Medications Discontinued During This Encounter   Medication Reason     amLODIPine (NORVASC) 5 MG tablet      mometasone (ELOCON) 0.1 % ointment         Physical Exam   GENERAL APPEARANCE: alert and no distress  HENT: no obvious abnormalities on appearance  RESP: breathing appears unremarkable with normal rate, no audible wheezing or cough and no apparent shortness of breath with conversation  MS: extremities normal - no gross deformities noted  SKIN: no apparent rash and normal skin tone  NEURO: speech is clear with no obvious neurological deficits  PSYCH: mentation appears normal and affect normal        Data     Renal Latest Ref Rng & Units 12/31/2020 6/11/2020 2/25/2020   Na 133 - 144 mmol/L 139 139 139   Na (external) 135 - 145 mmol/L - - -   K 3.4 - 5.3 mmol/L 4.2 4.2 3.6   K (external) 3.5 - 5.0 mmol/L - - -   Cl 94 - 109 mmol/L 107 107 107   Cl (external) 98 - 110 mmol/L - - -   CO2 20 - 32 mmol/L 27 27 27   CO2 (external) 21 - 31 mmol/L - - -   BUN 7 - 30 mg/dL 15 19 16   BUN (external) 8 - 25 mg/dL - - -   Cr 0.66 - 1.25 mg/dL 1.40(H) 1.37(H) 1.22   Cr (external) 0.72 - 1.25 mg/dL - - -   Glucose 70 - 99 mg/dL 90 87 87   Glucose (external) 65 - 100 mg/dL - - -   Ca  8.5 - 10.1 mg/dL 8.8 8.9 9.0   Ca (external) 8.5 - 10.5 mg/dL - - -   Mg 1.6 - 2.3 mg/dL - - -   Mg (external) 1.6 - 2.6 mg/dL - - -     Bone Health Latest Ref Rng & Units 7/24/2015 6/1/2015 1/22/2015   Phos 2.5 - 4.5 mg/dL 2.4(L) 3.1 1.9(L)   Phos (external) 2.3 - 4.7 mg/dL - - -   PTHi 12 - 72 pg/mL - - -     Heme Latest Ref Rng & Units 12/31/2020 6/11/2020 2/25/2020   WBC 4.0 - 11.0 10e9/L 5.9 4.9 4.8   WBC (external) 4.5 - 11.0 thou/cu mm - - -   Hgb 13.3 - 17.7 g/dL 15.5 15.4 15.5   Hgb (external) 13.5 - 17.5 g/dL - - -   Plt 150 - 450 10e9/L 212 205 212   Plt (external) 140 - 440 thou/cu mm - - -   ABSOLUTE NEUTROPHIL 1.6 - 8.3 10e9/L - - -   ABSOLUTE NEUTROPHILS (EXTERNAL) 1.7 - 7.0 thou/cu mm - - -   ABSOLUTE LYMPHOCYTES 0.8 - 5.3 10e9/L - - -   ABSOLUTE LYMPHOCYTES (EXTERNAL) 0.9 - 2.9 thou/cu mm - - -   ABSOLUTE MONOCYTES 0.0 - 1.3 10e9/L - - -   ABSOLUTE MONOCYTES (EXTERNAL)  <0.9 thou/cu mm - - -   ABSOLUTE EOSINOPHILS 0.0 - 0.7 10e9/L - - -   ABSOLUTE EOSINOPHILS (EXTERNAL) <0.5 thou/cu mm - - -   ABSOLUTE BASOPHILS 0.0 - 0.2 10e9/L - - -   ABSOLUTE BASOPHILS (EXTERNAL) <0.3 thou/cu mm - - -   ABS IMMATURE GRANULOCYTES 0 - 0.4 10e9/L - - -     Liver Latest Ref Rng & Units 12/31/2020 6/11/2020 2/25/2020   AP 40 - 150 U/L 63 57 58   AP (external) 50 - 136 U/L - - -   TBili 0.2 - 1.3 mg/dL 1.4(H) 1.3 1.4(H)   TBili (external) 0.2 - 1.2 mg/dL - - -   DBili 0.0 - 0.2 mg/dL 0.3(H) 0.3(H) 0.2   DBili (external) 0.1 - 0.5 mg/dL - - -   ALT 0 - 70 U/L 54 30 23   ALT (external) 8 - 45 U/L - - -   AST 0 - 45 U/L 30 17 18   AST (external) 2 - 40 U/L - - -   Tot Protein 6.8 - 8.8 g/dL 6.9 7.1 6.9   Tot Protein (external) 6.0 - 8.0 g/dL - - -   Albumin 3.4 - 5.0 g/dL 3.8 3.7 3.7   Albumin (external) 3.5 - 5.2 g/dL - - -     Pancreas Latest Ref Rng & Units 1/15/2013 1/13/2013 1/6/2013   Amylase 30 - 110 U/L <30(L) 59 116(H)   Lipase 20 - 250 U/L 28 - 263(H)     Iron studies Latest Ref Rng & Units 1/8/2013 10/3/2012   Iron 35 - 180 ug/dL 15(L) 82   Iron sat 15 - 46 % 12(L) 70(H)   Ferritin 20 - 300 ng/mL 500(H) -     [facility] T[facility] Virology Latest Ref Rng & Units 2/3/2014 1/21/2014 1/30/2013   CVM DNA Quant - - - Whole blood, EDTA anticoagulant   CMV Quant <100 Copies/mL - - <100  No CMV DNA detected.   CMV QT Log <2.0 Log copies/mL - - <2.0  The Cytomegalovirus DNA Quantitation assay is a real-time polymerase chain   reaction (PCR) utilizing analyte specific reagents manufactured by [employer]   [employer]. Analyte Specific Reagents (ASRs) are used in many laboratory   tests necessary for standard medical care and generally do not require FDA   approval.   This test was developed and its performance characteristics determined by   [facility].  It has not been   cleared or approved by the [place] Food and Drug Administration.   BK Spec - - - Plasma, EDTA anticoagulant    BK Res <1000 copies/mL - - <1000   BK Log <3.0 Log copies/mL - - <3.0  The lower limit of detection for this assay is 1000 copies/mL.  Real-time TaqMan   PCR was performed using BK primers and probe for the detection of a 90 bp   portion of the  1 gene.  The performance characteristics were validated by   the [facility], [place].   It has not been cleared or approved by the U.S. Food and Drug Administration.   EBV VCA IGG ANTIBODY U/mL - - -   EBV VCA IGM ANTIBODY U/mL - - -   Hep B Core NEG - Negative -   Hep B Surf - 1.2 - -   HIV 1&2 NEG - - -        Recent Labs   Lab Test 08/12/13  0745 08/19/13  0755 09/09/13  0745   DOSTAC 2000, 08/11/13 08/18/13 2000 NTP   TACROL 5.9 <3.0 Tacrolimus Reference Range  Kidney Transplant  Pediatric                      ug/L    0-3 months post transplant   10-12    3-6 months post transplant   8-10    6-12 months post transplant  6-8    >12 months post transplant   4-7  Adult    0-6 months post transplant   8-10    6-12 months post transplant  6-8    >12 months post transplant   4-6    >5 years post transplant     3-5  Heart Transplant  Pediatric    0-12 months post transplant  10-15    >12 months post transplant   5-10  Adult    0-3 months post transplant   10-15    3-6 months post transplant   8-12    6-12 months post transplant  6-12    >12 months post transplant   6-10  Lung Transplant    0-12 months post transplant  10-15    >12 months post transplant   8-12  Liver Transplant  Pediatric    0-3 months post transplant   10-15    3-6 months post transplant   8-10    >6 months post transplant    6-8  Adult    0-3 months post transplant   10-12    3-6 months post transplant   8-10    >6 months post transplant    6-8  Pancreas Transplant    0-6 months post transplant   8-10    >6 months post transplant    5-8* <3.0 Tacrolimus Reference Range  Kidney Transplant  Pediatric                      ug/L    0-3 months post transplant   10-12    3-6 months  post transplant   8-10    6-12 months post transplant  6-8    >12 months post transplant   4-7  Adult    0-6 months post transplant   8-10    6-12 months post transplant  6-8    >12 months post transplant   4-6    >5 years post transplant     3-5  Heart Transplant  Pediatric    0-12 months post transplant  10-15    >12 months post transplant   5-10  Adult    0-3 months post transplant   10-15    3-6 months post transplant   8-12    6-12 months post transplant  6-12    >12 months post transplant   6-10  Lung Transplant    0-12 months post transplant  10-15    >12 months post transplant   8-12  Liver Transplant  Pediatric    0-3 months post transplant   10-15    3-6 months post transplant   8-10    >6 months post transplant    6-8  Adult    0-3 months post transplant   10-12    3-6 months post transplant   8-10    >6 months post transplant    6-8  Pancreas Transplant    0-6 months post transplant   8-10    >6 months post transplant    5-8*     Recent Labs   Lab Test 05/15/17  0741 10/04/18  0820 10/15/18  0748   DOSMPA 8p 5.14.2017 Not Provided Not Provided   MPACID 1.20 0.97* 1.10   MPAG 51.8 70.7 76.6       Go Anderson MD on 1/5/2021 at 1:34 PM

## 2021-01-05 NOTE — LETTER
1/5/2021       RE: Jarod Quesada  [address] [place] [address]     Dear Colleague,    Thank you for referring your patient, Jarod Quesada, to the [facility] NEPHROLOGY [facility]INIC [place] at [facility]. Please see a copy of my visit note below.    Jarod Quesada is a 50 year old male who is being evaluated via a billable video visit.      How would you like to obtain your AVS? Mail a copy  If the video visit is dropped, the invitation should be resent by: Send to e-mail at: [email or web address]  Will anyone else be joining your video visit? No    Video-Visit Details    Type of service:  Video Visit    Video Start Time: 1:04 PM    Video End Time:1:39 PM    Originating Location (pt. Location): Home    Distant Location (provider location):  [facility] NEPHROLOGY [facility]INIC [place]     Platform used for Video Visit: Get Fractal       CHRONIC TRANSPLANT NEPHROLOGY VISIT    Assessment & Plan   # DDKT (K): Stable   - Baseline Creatinine:  ~ 1.2 - 1.4   - Proteinuria: Normal (<0.2 grams)   - Date DSA Last Checked: Jan/2017      Latest DSA: No   - BK Viremia: Not checked recently due to time from transplant   - Kidney Tx Biopsy: No    # Liver Tx (K): Stable, liver function appears to be stable, T.deyis and INR on higher end of normal. Follows with transplant hepatology.     # Immunosuppression: Mycophenolate mofetil (dose 750 mg every 12 hours) and Sirolimus (goal 4-6)          - Continue with intensive monitoring of immunosuppression for efficacy and toxicity.          - Changes: No    # Infection Prophylaxis:   - PJP: None    # Hypertension: Borderline control;  Goal BP: < 130/80   - Changes: Yes - BP runs mostly 150 - 130 systolic. He is currently on Losartan 100 mg daily, will add amlodipine 5 mg daily. Conisder adding HCTZ or chlorthalidone if BP is not controlled in 2 weeks.     # Blood glucose : will check hemoglobin A1C    #  Hemoglobin : normal , previous work up for possible erythrocytosis (US native kidney with no masses), on ARB    # Mineral Bone Disorder:    - PTH not checked recently  - Vitamin D; level: Not checked recently        On supplement: Yes  - Calcium; level: Normal        On supplement: No   - Will check PTH and vitamin D.     # Overweight: he lost 15 lb since last visit.    - Recommend weight loss for overall health by increasing exercise and watching caloric intake.    # Skin Cancer Risk:    - Discussed sun protection and recommend regular follow up with Dermatology.    # Medical Compliance: Yes    # Transplant History:  Etiology of Kidney Failure: Hepatorenal syndrome (HRS)  Tx: DDKT (SLK)  Transplant: 1/14/2013 (Kidney), 1/14/2013 (Liver)  Donor Type: Donation after Brain Death Donor Class: Standard Criteria Donor  Significant transplant-related complications: None    Transplant Office Phone Number: [phone]    Assessment and plan was discussed with the patient and he voiced his understanding and agreement.    Return visit: Return in about 1 year (around 1/5/2022).    Go Anderson MD   Attestation:  This patient has been seen and evaluated by me, Doyle Timmons MD.  I have reviewed the note and agree with plan of care as documented by the fellow.       Chief Complaint   Mr. Cherelle Quesada is a 50 year old here for kidney transplant and immunosuppression management.    History of Present Illness    He denies any significant medication side effects. He denies any N/V/D, fever or chills. He lost 15 lb since last visit. His BP remains uncontrolled with most SBP readings in 150-130 range, currently he takes 100 mg daily losartan. No night sweats fatigue or change in energy level.     Recent Hospitalizations:  [x] No [] Yes    New Medical Issues: [x] No [] Yes    Decreased energy: [x] No [] Yes    Chest pain or SOB with exertion:  [x] No [] Yes    Appetite change or weight change: [] No [x] Yes He lost  15 lb since last visit   Nausea, vomiting or diarrhea:  [x] No [] Yes    Fever, sweats or chills: [x] No [] Yes    Leg swelling: [x] No [] Yes      Home BP: 150-130 SBP    Review of Systems   A comprehensive review of systems was obtained and negative, except as noted in the HPI or PMH.    Problem List   Patient Active Problem List   Diagnosis     Generalized anxiety disorder     Kidney replaced by transplant     Liver replaced by transplant (H)     Immunosuppression (H)     HTN, kidney transplant related     Secondary renal hyperparathyroidism (H)     Aftercare following organ transplant     Status post hip replacement, right     Vitamin D deficiency     Post-transplant erythrocytosis     Chronic kidney disease, stage 3       Social History   Social History     Tobacco Use     Smoking status: Never Smoker     Smokeless tobacco: Never Used   Substance Use Topics     Alcohol use: No     Comment: Occasional, rare     Drug use: No       Allergies   Allergies   Allergen Reactions     Paxil [Paroxetine] Other (See Comments)     Caused Severe Tremor     Cepacol Maximum Strength Rash     Chlorhexidine Rash     Phenol Rash       Medications   Current Outpatient Medications   Medication Sig     aspirin (ASA) 81 MG chewable tablet Take 81 mg by mouth daily     gabapentin (NEURONTIN) 100 MG capsule Take 6 capsules (600 mg) by mouth in the morning, take 5 capsules (500 mg) by mouth in the evening.     losartan (COZAAR) 100 MG tablet Take 1 tablet (100 mg) by mouth At Bedtime     multivitamin, therapeutic with minerals (MULTI-VITAMIN) TABS tablet Take 1 tablet by mouth daily     mycophenolate (GENERIC EQUIVALENT) 250 MG capsule Take 3 capsules (750 mg) by mouth 2 times daily     sildenafil (VIAGRA) 50 MG tablet Take 0.5-1 tablets (25-50 mg) by mouth daily as needed for erectile dysfunction Take 30 min to 4 hours before intercourse.  Never use with nitroglycerin, terazosin or doxazosin.     sirolimus (GENERIC EQUIVALENT) 1 MG  tablet Take 1 tablet (1 mg) by mouth daily     CHOLECALCIFEROL PO Take 5,000 Units by mouth daily      No current facility-administered medications for this visit.      Medications Discontinued During This Encounter   Medication Reason     amLODIPine (NORVASC) 5 MG tablet      mometasone (ELOCON) 0.1 % ointment        Physical Exam   GENERAL APPEARANCE: alert and no distress  HENT: no obvious abnormalities on appearance  RESP: breathing appears unremarkable with normal rate, no audible wheezing or cough and no apparent shortness of breath with conversation  MS: extremities normal - no gross deformities noted  SKIN: no apparent rash and normal skin tone  NEURO: speech is clear with no obvious neurological deficits  PSYCH: mentation appears normal and affect normal        Data     Renal Latest Ref Rng & Units 12/31/2020 6/11/2020 2/25/2020   Na 133 - 144 mmol/L 139 139 139   Na (external) 135 - 145 mmol/L - - -   K 3.4 - 5.3 mmol/L 4.2 4.2 3.6   K (external) 3.5 - 5.0 mmol/L - - -   Cl 94 - 109 mmol/L 107 107 107   Cl (external) 98 - 110 mmol/L - - -   CO2 20 - 32 mmol/L 27 27 27   CO2 (external) 21 - 31 mmol/L - - -   BUN 7 - 30 mg/dL 15 19 16   BUN (external) 8 - 25 mg/dL - - -   Cr 0.66 - 1.25 mg/dL 1.40(H) 1.37(H) 1.22   Cr (external) 0.72 - 1.25 mg/dL - - -   Glucose 70 - 99 mg/dL 90 87 87   Glucose (external) 65 - 100 mg/dL - - -   Ca  8.5 - 10.1 mg/dL 8.8 8.9 9.0   Ca (external) 8.5 - 10.5 mg/dL - - -   Mg 1.6 - 2.3 mg/dL - - -   Mg (external) 1.6 - 2.6 mg/dL - - -     Bone Health Latest Ref Rng & Units 7/24/2015 6/1/2015 1/22/2015   Phos 2.5 - 4.5 mg/dL 2.4(L) 3.1 1.9(L)   Phos (external) 2.3 - 4.7 mg/dL - - -   PTHi 12 - 72 pg/mL - - -     Heme Latest Ref Rng & Units 12/31/2020 6/11/2020 2/25/2020   WBC 4.0 - 11.0 10e9/L 5.9 4.9 4.8   WBC (external) 4.5 - 11.0 thou/cu mm - - -   Hgb 13.3 - 17.7 g/dL 15.5 15.4 15.5   Hgb (external) 13.5 - 17.5 g/dL - - -   Plt 150 - 450 10e9/L 212 205 212   Plt (external) 140  - 440 thou/cu mm - - -   ABSOLUTE NEUTROPHIL 1.6 - 8.3 10e9/L - - -   ABSOLUTE NEUTROPHILS (EXTERNAL) 1.7 - 7.0 thou/cu mm - - -   ABSOLUTE LYMPHOCYTES 0.8 - 5.3 10e9/L - - -   ABSOLUTE LYMPHOCYTES (EXTERNAL) 0.9 - 2.9 thou/cu mm - - -   ABSOLUTE MONOCYTES 0.0 - 1.3 10e9/L - - -   ABSOLUTE MONOCYTES (EXTERNAL) <0.9 thou/cu mm - - -   ABSOLUTE EOSINOPHILS 0.0 - 0.7 10e9/L - - -   ABSOLUTE EOSINOPHILS (EXTERNAL) <0.5 thou/cu mm - - -   ABSOLUTE BASOPHILS 0.0 - 0.2 10e9/L - - -   ABSOLUTE BASOPHILS (EXTERNAL) <0.3 thou/cu mm - - -   ABS IMMATURE GRANULOCYTES 0 - 0.4 10e9/L - - -     Liver Latest Ref Rng & Units 12/31/2020 6/11/2020 2/25/2020   AP 40 - 150 U/L 63 57 58   AP (external) 50 - 136 U/L - - -   TBili 0.2 - 1.3 mg/dL 1.4(H) 1.3 1.4(H)   TBili (external) 0.2 - 1.2 mg/dL - - -   DBili 0.0 - 0.2 mg/dL 0.3(H) 0.3(H) 0.2   DBili (external) 0.1 - 0.5 mg/dL - - -   ALT 0 - 70 U/L 54 30 23   ALT (external) 8 - 45 U/L - - -   AST 0 - 45 U/L 30 17 18   AST (external) 2 - 40 U/L - - -   Tot Protein 6.8 - 8.8 g/dL 6.9 7.1 6.9   Tot Protein (external) 6.0 - 8.0 g/dL - - -   Albumin 3.4 - 5.0 g/dL 3.8 3.7 3.7   Albumin (external) 3.5 - 5.2 g/dL - - -     Pancreas Latest Ref Rng & Units 1/15/2013 1/13/2013 1/6/2013   Amylase 30 - 110 U/L <30(L) 59 116(H)   Lipase 20 - 250 U/L 28 - 263(H)     Iron studies Latest Ref Rng & Units 1/8/2013 10/3/2012   Iron 35 - 180 ug/dL 15(L) 82   Iron sat 15 - 46 % 12(L) 70(H)   Ferritin 20 - 300 ng/mL 500(H) -     [facility] Txp Virology Latest Ref Rng & Units 2/3/2014 1/21/2014 1/30/2013   CVM DNA Quant - - - Whole blood, EDTA anticoagulant   CMV Quant <100 Copies/mL - - <100  No CMV DNA detected.   CMV QT Log <2.0 Log copies/mL - - <2.0  The Cytomegalovirus DNA Quantitation assay is a real-time polymerase chain   reaction (PCR) utilizing analyte specific reagents manufactured by [employer]   [employer]. Analyte Specific Reagents (ASRs) are used in many laboratory   tests necessary for standard  medical care and generally do not require FDA   approval.   This test was developed and its performance characteristics determined by   [facility].  It has not been   cleared or approved by the US Food and Drug Administration.   BK Spec - - - Plasma, EDTA anticoagulant   BK Res <1000 copies/mL - - <1000   BK Log <3.0 Log copies/mL - - <3.0  The lower limit of detection for this assay is 1000 copies/mL.  Real-time TaqMan   PCR was performed using BK primers and probe for the detection of a 90 bp   portion of the  1 gene.  The performance characteristics were validated by   the [facility].   It has not been cleared or approved by the U.S. Food and Drug Administration.   EBV VCA IGG ANTIBODY U/mL - - -   EBV VCA IGM ANTIBODY U/mL - - -   Hep B Core NEG - Negative -   Hep B Surf - 1.2 - -   HIV 1&2 NEG - - -        Recent Labs   Lab Test 08/12/13  0745 08/19/13  0755 09/09/13  0745   DOSTAC 2000, 08/11/13 08/18/13 2000 NTP   TACROL 5.9 <3.0 Tacrolimus Reference Range  Kidney Transplant  Pediatric                      ug/L    0-3 months post transplant   10-12    3-6 months post transplant   8-10    6-12 months post transplant  6-8    >12 months post transplant   4-7  Adult    0-6 months post transplant   8-10    6-12 months post transplant  6-8    >12 months post transplant   4-6    >5 years post transplant     3-5  Heart Transplant  Pediatric    0-12 months post transplant  10-15    >12 months post transplant   5-10  Adult    0-3 months post transplant   10-15    3-6 months post transplant   8-12    6-12 months post transplant  6-12    >12 months post transplant   6-10  Lung Transplant    0-12 months post transplant  10-15    >12 months post transplant   8-12  Liver Transplant  Pediatric    0-3 months post transplant   10-15    3-6 months post transplant   8-10    >6 months post transplant    6-8  Adult    0-3 months post transplant    10-12    3-6 months post transplant   8-10    >6 months post transplant    6-8  Pancreas Transplant    0-6 months post transplant   8-10    >6 months post transplant    5-8* <3.0 Tacrolimus Reference Range  Kidney Transplant  Pediatric                      ug/L    0-3 months post transplant   10-12    3-6 months post transplant   8-10    6-12 months post transplant  6-8    >12 months post transplant   4-7  Adult    0-6 months post transplant   8-10    6-12 months post transplant  6-8    >12 months post transplant   4-6    >5 years post transplant     3-5  Heart Transplant  Pediatric    0-12 months post transplant  10-15    >12 months post transplant   5-10  Adult    0-3 months post transplant   10-15    3-6 months post transplant   8-12    6-12 months post transplant  6-12    >12 months post transplant   6-10  Lung Transplant    0-12 months post transplant  10-15    >12 months post transplant   8-12  Liver Transplant  Pediatric    0-3 months post transplant   10-15    3-6 months post transplant   8-10    >6 months post transplant    6-8  Adult    0-3 months post transplant   10-12    3-6 months post transplant   8-10    >6 months post transplant    6-8  Pancreas Transplant    0-6 months post transplant   8-10    >6 months post transplant    5-8*     Recent Labs   Lab Test 05/15/17  0741 10/04/18  0820 10/15/18  0748   DOSMPA 8p 5.14.2017 Not Provided Not Provided   MPACID 1.20 0.97* 1.10   MPAG 51.8 70.7 76.6       Go Anderson MD on 1/5/2021 at 1:34 PM        Again, thank you for allowing me to participate in the care of your patient.      Sincerely,    Kidney/Pancreas Recipient

## 2021-01-05 NOTE — PROGRESS NOTES
[facility]  LIVER TRANSPLANT CLINIC FOLLOW UP  VIDEO VISIT     A/P  Mr. Quesada is a 50 Y M s/p SLK 1/14/13 for ETOH.  Medically doing extremely well.    IS Rapa and MMF. No changes to medications today.  John function Excellent Labs up to date and normal.     Weight gain He has lost 15 pounds  Proph Discussed skin cancer screening: UTD on derm  CRC screening Colonoscopy 7/6/20. Repeat   Vaccinations Shingles shot done 2019. Had the flu shot this year  Covid Discussed vaccine and precautions.  RTC 1 y.    Jeannine Biggs MD  Hepatology/ Liver Transplant  [facility]  ==================================================================  PCP: Dr. Gil at [facility].   Nephrology: Dr. Jalen Emerson     SUBJECTIVE  Mr. Quesada is 50 Y M s/p SLK 1/14/13 for ETOH.    Weight down about 15 pounds.  EXPLANT: Cirrhosis, no HCC  IS: SRL and MMF  LABS: Up to date and normal liver tests and CBC    Lab Test 12/31/20  0755   PROTTOTAL 6.9   ALBUMIN 3.8   BILITOTAL 1.4*   ALKPHOS 63   AST 30   ALT 54     Lab Test 12/31/20  0755   WBC 5.9   RBC 5.68   HGB 15.5   HCT 48.2   MCV 85   MCH 27.3   MCHC 32.2   RDW 13.0        REJECTION: None.  BILIARY ISSUES: None  STENT: No stent on abdominal imaging post transpalnt  KIDNEY FUNCTION: Sees Dr. Emerson. Stable. No proteinuria          Creatinine   Date Value Ref Range Status   12/31/2020 1.40 (H) 0.66 - 1.25 mg/dL Final     BP: Controlled on amlodipine, metoprolol. Checks at home in the 120s/70s.   PREV: UTD on screening (colonoscopy 6/29/2012 no polyps, area of ulceration. Path showed nonspecific changes. Record is located in Media tab on 9/21/12)   Derm Feb 8/2019  Flu shot had it.  DISEASE RECURRENCE: No alcohol  OTHER ISSUES: Struggles with weight gain. Also has neuropathy, on gabapentin.   HLD  Incisional hernia not worse  NEW ISSUES:  Had a R ROBERTA with Dr. Scott here in early November 2018. Probably needs the other one.    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 Daughter has been home off and on from college during Covid.  ROS: 10 point ROS neg other than the symptoms noted above in the HPI.  Exam  Gen Alert pleasant NAD  Resp No difficulty breathing. No cough  Skin No Jaundice  Eyes No icterus  Neuro STOUT  MSK no muscle wasting  Psyche Pleasant, appropriate. Well groomed.    Jarod Quesada is a 50 year old male who is being evaluated via a billable video visit.      The patient has been notified of following:   \"This video visit will be conducted via a call between you and your physician/provider. We have found that certain health care needs can be provided without the need for an in-person physical exam.  This service lets us provide the care you need with a video conversation.  If a prescription is necessary we can send it directly to your pharmacy.  If lab work is needed we can place an order for that and you can then stop by our lab to have the test done at a later time. Video visits are billed at different rates depending on your insurance coverage.  Please reach out to your insurance provider with any questions.  If during the course of the call the physician/provider feels a video visit is not appropriate, you will not be charged for this service.\"   Patient has given verbal consent for Video visit? Yes    Type of service:  Video Visit  Video Start Time: 11:31  Video End Time: 11:54  Patient location: home  Will anyone else be joining your video visit? wife  {If patient encounters technical issues they should call [phone] :1  Distant Location (provider location):  [facility] HEPATOLOGY CLINIC [place]   Mode of Communication:  Video Conference via [employer]  I have reviewed and updated the patient's Past Medical History, Social History, Family History and Medication List.  "

## 2021-01-05 NOTE — LETTER
1/5/2021         RE: Jarod Quesada  [address] [place] [address]        Dear Colleague,    Thank you for referring your patient, Jarod Quesada, to the [facility] HEPATOLOGY CLINIC [place]. Please see a copy of my visit note below.    [facility]  LIVER TRANSPLANT CLINIC FOLLOW UP  VIDEO VISIT     A/P  Mr. Quesada is a 50 Y M s/p SLK 1/14/13 for ETOH.  Medically doing extremely well.    IS Rapa and MMF. No changes to medications today.  John function Excellent Labs up to date and normal.     Weight gain He has lost 15 pounds  Proph Discussed skin cancer screening: UTD on derm  CRC screening Colonoscopy 7/6/20. Repeat   Vaccinations Shingles shot done 2019. Had the flu shot this year  Covid Discussed vaccine and precautions.  RTC 1 y.    Jeannine Biggs MD  Hepatology/ Liver Transplant  [facility]  ==================================================================  PCP: Dr. Gil at [facility].   Nephrology: Dr. Jalen Emerson     SUBJECTIVE  Mr. Quesada is 50 Y M s/p SLK 1/14/13 for ETOH.    Weight down about 15 pounds.  EXPLANT: Cirrhosis, no HCC  IS: SRL and MMF  LABS: Up to date and normal liver tests and CBC    Lab Test 12/31/20  0755   PROTTOTAL 6.9   ALBUMIN 3.8   BILITOTAL 1.4*   ALKPHOS 63   AST 30   ALT 54     Lab Test 12/31/20  0755   WBC 5.9   RBC 5.68   HGB 15.5   HCT 48.2   MCV 85   MCH 27.3   MCHC 32.2   RDW 13.0        REJECTION: None.  BILIARY ISSUES: None  STENT: No stent on abdominal imaging post transpalnt  KIDNEY FUNCTION: Sees Dr. Emerson. Stable. No proteinuria          Creatinine   Date Value Ref Range Status   12/31/2020 1.40 (H) 0.66 - 1.25 mg/dL Final     BP: Controlled on amlodipine, metoprolol. Checks at home in the 120s/70s.   PREV: UTD on screening (colonoscopy 6/29/2012 no polyps, area of ulceration. Path showed nonspecific changes. Record is located in Media tab on 9/21/12)   Derm Feb 8/2019  Flu shot had  "it.  DISEASE RECURRENCE: No alcohol  OTHER ISSUES: Struggles with weight gain. Also has neuropathy, on gabapentin.   HLD  Incisional hernia not worse  NEW ISSUES:  Had a R ROBERTA with Dr. Scott here in early November 2018. Probably needs the other one.    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 Daughter has been home off and on from college during Covid.  ROS: 10 point ROS neg other than the symptoms noted above in the HPI.  Exam  Gen Alert pleasant NAD  Resp No difficulty breathing. No cough  Skin No Jaundice  Eyes No icterus  Neuro STOUT  MSK no muscle wasting  Psyche Pleasant, appropriate. Well groomed.    Jarod Quesada is a 50 year old male who is being evaluated via a billable video visit.      The patient has been notified of following:   \"This video visit will be conducted via a call between you and your physician/provider. We have found that certain health care needs can be provided without the need for an in-person physical exam.  This service lets us provide the care you need with a video conversation.  If a prescription is necessary we can send it directly to your pharmacy.  If lab work is needed we can place an order for that and you can then stop by our lab to have the test done at a later time. Video visits are billed at different rates depending on your insurance coverage.  Please reach out to your insurance provider with any questions.  If during the course of the call the physician/provider feels a video visit is not appropriate, you will not be charged for this service.\"   Patient has given verbal consent for Video visit? Yes    Type of service:  Video Visit  Video Start Time: 11:31  Video End Time: 11:54  Patient location: home  Will anyone else be joining your video visit? wife  {If patient encounters technical issues they should call [phone] :1  Distant Location (provider location):  [facility] HEPATOLOGY [facility]IN[facility] [place]   Mode of Communication:  Video " Conference via [employer]  I have reviewed and updated the patient's Past Medical History, Social History, Family History and Medication List.      Again, thank you for allowing me to participate in the care of your patient.        Sincerely,        Jeannine Biggs MD

## 2021-02-17 ENCOUNTER — MEDICAL CORRESPONDENCE (OUTPATIENT)
Dept: HEALTH INFORMATION MANAGEMENT | Facility: CLINIC | Age: 51
End: 2021-02-17

## 2021-02-18 ENCOUNTER — TRANSCRIBE ORDERS (OUTPATIENT)
Dept: OTHER | Age: 51
End: 2021-02-18

## 2021-02-18 DIAGNOSIS — M25.552 BILATERAL HIP PAIN: Primary | ICD-10-CM

## 2021-02-18 DIAGNOSIS — M25.551 BILATERAL HIP PAIN: Primary | ICD-10-CM

## 2021-03-10 ENCOUNTER — IMMUNIZATION (OUTPATIENT)
Dept: NURSING | Facility: CLINIC | Age: 51
End: 2021-03-10
Payer: COMMERCIAL

## 2021-03-10 PROCEDURE — 0031A PR COVID VAC JANSSEN AD26 0.5ML: CPT

## 2021-03-10 PROCEDURE — 91303 PR COVID VAC JANSSEN AD26 0.5ML: CPT

## 2021-04-02 DIAGNOSIS — I15.1 HTN, KIDNEY TRANSPLANT RELATED: ICD-10-CM

## 2021-04-02 DIAGNOSIS — Z94.0 HTN, KIDNEY TRANSPLANT RELATED: ICD-10-CM

## 2021-04-02 RX ORDER — AMLODIPINE BESYLATE 5 MG/1
5 TABLET ORAL DAILY
Qty: 90 TABLET | Refills: 0 | Status: SHIPPED | OUTPATIENT
Start: 2021-04-02 | End: 2021-06-29

## 2021-04-11 ENCOUNTER — HEALTH MAINTENANCE LETTER (OUTPATIENT)
Age: 51
End: 2021-04-11

## 2021-06-18 ENCOUNTER — TELEPHONE (OUTPATIENT)
Dept: TRANSPLANT | Facility: CLINIC | Age: 51
End: 2021-06-18

## 2021-06-18 ENCOUNTER — HOSPITAL ENCOUNTER (OUTPATIENT)
Facility: CLINIC | Age: 51
Discharge: HOME | End: 2021-06-18
Attending: INTERNAL MEDICINE | Admitting: INTERNAL MEDICINE
Payer: COMMERCIAL

## 2021-06-18 DIAGNOSIS — Z94.4 LIVER REPLACED BY TRANSPLANT (H): Primary | ICD-10-CM

## 2021-06-18 DIAGNOSIS — Z94.4 LIVER TRANSPLANTED (H): Primary | ICD-10-CM

## 2021-06-18 DIAGNOSIS — Z13.220 LIPID SCREENING: ICD-10-CM

## 2021-06-18 DIAGNOSIS — Z94.4 LIVER REPLACED BY TRANSPLANT (H): ICD-10-CM

## 2021-06-18 LAB
ALBUMIN SERPL-MCNC: 3.7 G/DL (ref 3.4–5)
ALBUMIN UR-MCNC: NEGATIVE MG/DL
ALP SERPL-CCNC: 67 U/L (ref 40–150)
ALT SERPL W P-5'-P-CCNC: 30 U/L (ref 0–70)
ANION GAP SERPL CALCULATED.3IONS-SCNC: 4 MMOL/L (ref 3–14)
APPEARANCE UR: NORMAL
AST SERPL W P-5'-P-CCNC: 21 U/L (ref 0–45)
BILIRUB DIRECT SERPL-MCNC: 0.3 MG/DL (ref 0–0.2)
BILIRUB SERPL-MCNC: 1 MG/DL (ref 0.2–1.3)
BILIRUB UR QL STRIP: NEGATIVE
BUN SERPL-MCNC: 16 MG/DL (ref 7–30)
CALCIUM SERPL-MCNC: 8.9 MG/DL (ref 8.5–10.1)
CHLORIDE SERPL-SCNC: 109 MMOL/L (ref 94–109)
CHOLEST SERPL-MCNC: 150 MG/DL
CO2 SERPL-SCNC: 27 MMOL/L (ref 20–32)
COLOR UR AUTO: YELLOW
CREAT SERPL-MCNC: 1.36 MG/DL (ref 0.66–1.25)
ERYTHROCYTE [DISTWIDTH] IN BLOOD BY AUTOMATED COUNT: 13.5 % (ref 10–15)
GFR SERPL CREATININE-BSD FRML MDRD: 60 ML/MIN/{1.73_M2}
GLUCOSE SERPL-MCNC: 91 MG/DL (ref 70–99)
GLUCOSE UR STRIP-MCNC: NEGATIVE MG/DL
HCT VFR BLD AUTO: 45 % (ref 40–53)
HDLC SERPL-MCNC: 46 MG/DL
HGB BLD-MCNC: 14.4 G/DL (ref 13.3–17.7)
HGB UR QL STRIP: NEGATIVE
KETONES UR STRIP-MCNC: NEGATIVE MG/DL
LDLC SERPL CALC-MCNC: 90 MG/DL
LEUKOCYTE ESTERASE UR QL STRIP: NEGATIVE
MCH RBC QN AUTO: 26.2 PG (ref 26.5–33)
MCHC RBC AUTO-ENTMCNC: 32 G/DL (ref 31.5–36.5)
MCV RBC AUTO: 82 FL (ref 78–100)
NITRATE UR QL: NEGATIVE
NONHDLC SERPL-MCNC: 104 MG/DL
PH UR STRIP: 5 PH (ref 5–7)
PLATELET # BLD AUTO: 213 10E9/L (ref 150–450)
POTASSIUM SERPL-SCNC: 4.7 MMOL/L (ref 3.4–5.3)
PROT SERPL-MCNC: 7 G/DL (ref 6.8–8.8)
RBC # BLD AUTO: 5.5 10E12/L (ref 4.4–5.9)
RBC #/AREA URNS AUTO: 1 /HPF (ref 0–2)
SIROLIMUS BLD-MCNC: 10.7 UG/L (ref 5–15)
SODIUM SERPL-SCNC: 140 MMOL/L (ref 133–144)
SOURCE: NORMAL
SP GR UR STRIP: 1.01 (ref 1–1.03)
TME LAST DOSE: NORMAL H
TRIGL SERPL-MCNC: 67 MG/DL
UROBILINOGEN UR STRIP-MCNC: 0 MG/DL (ref 0–2)
WBC # BLD AUTO: 7.6 10E9/L (ref 4–11)
WBC #/AREA URNS AUTO: <1 /HPF (ref 0–5)

## 2021-06-18 PROCEDURE — 80076 HEPATIC FUNCTION PANEL: CPT | Performed by: PATHOLOGY

## 2021-06-18 PROCEDURE — 81001 URINALYSIS AUTO W/SCOPE: CPT | Performed by: INTERNAL MEDICINE

## 2021-06-18 PROCEDURE — 80061 LIPID PANEL: CPT | Performed by: PATHOLOGY

## 2021-06-18 PROCEDURE — 80048 BASIC METABOLIC PNL TOTAL CA: CPT | Performed by: PATHOLOGY

## 2021-06-18 PROCEDURE — 80195 ASSAY OF SIROLIMUS: CPT | Mod: 90 | Performed by: PATHOLOGY

## 2021-06-18 PROCEDURE — 36415 COLL VENOUS BLD VENIPUNCTURE: CPT | Performed by: PATHOLOGY

## 2021-06-18 PROCEDURE — 85027 COMPLETE CBC AUTOMATED: CPT | Performed by: PATHOLOGY

## 2021-06-18 PROCEDURE — 99000 SPECIMEN HANDLING OFFICE-LAB: CPT | Performed by: PATHOLOGY

## 2021-06-18 RX ORDER — SIROLIMUS 0.5 MG/1
0.5 TABLET, FILM COATED ORAL DAILY
Qty: 90 TABLET | Refills: 3 | Status: SHIPPED | OUTPATIENT
Start: 2021-06-18 | End: 2022-05-20

## 2021-06-18 NOTE — TELEPHONE ENCOUNTER
ISSUE:   Sirolimus level 10.7 on 6/18, goal 5-8, dose 1 mg daily.    PLAN:   Please call patient and confirm this was an accurate 24-hour trough. Verify Sirolimus dose. Confirm no new medications or illness. Confirm no missed doses. If accurate trough and accurate dose, decrease Sirolimus dose to 0.5 mg daily and repeat labs in 2-3 mos   Catherine Hutchinson RN  OUTCOME:   Spoke with patient, they confirm accurate trough level and current dose 1 mg daily. Patient confirmed dose change to 0.5 mg daily and to repeat labs in 2-3 months. Orders sent to preferred pharmacy for dose change and lab for repeat labs. Patient voiced understanding of plan.     Alondra Hutchinson LPN

## 2021-06-18 NOTE — TELEPHONE ENCOUNTER
Call to Jarod to check in and to congratulate him on his award he received a few mos back for his work w/ [email or web address].   He is doing well.  He lost 45 pounds over the last few mos!  I congratulated him on his weight loss.  He will continue to have lab testing every 3-4 mos.

## 2021-06-29 DIAGNOSIS — Z94.0 KIDNEY REPLACED BY TRANSPLANT: ICD-10-CM

## 2021-06-29 DIAGNOSIS — I15.1 HTN, KIDNEY TRANSPLANT RELATED: ICD-10-CM

## 2021-06-29 DIAGNOSIS — Z94.0 HTN, KIDNEY TRANSPLANT RELATED: ICD-10-CM

## 2021-06-29 RX ORDER — MYCOPHENOLATE MOFETIL 250 MG/1
750 CAPSULE ORAL 2 TIMES DAILY
Qty: 540 CAPSULE | Refills: 3 | Status: SHIPPED | OUTPATIENT
Start: 2021-06-29 | End: 2022-05-20

## 2021-06-29 RX ORDER — LOSARTAN POTASSIUM 100 MG/1
100 TABLET ORAL AT BEDTIME
Qty: 90 TABLET | Refills: 3 | Status: SHIPPED | OUTPATIENT
Start: 2021-06-29 | End: 2022-05-20

## 2021-06-29 RX ORDER — AMLODIPINE BESYLATE 5 MG/1
5 TABLET ORAL DAILY
Qty: 90 TABLET | Refills: 0 | Status: SHIPPED | OUTPATIENT
Start: 2021-06-29 | End: 2021-09-13

## 2021-08-27 ENCOUNTER — DOCUMENTATION ONLY (OUTPATIENT)
Dept: TRANSPLANT | Facility: CLINIC | Age: 51
End: 2021-08-27

## 2021-09-13 DIAGNOSIS — I15.1 HTN, KIDNEY TRANSPLANT RELATED: ICD-10-CM

## 2021-09-13 DIAGNOSIS — Z94.0 HTN, KIDNEY TRANSPLANT RELATED: ICD-10-CM

## 2021-09-13 RX ORDER — AMLODIPINE BESYLATE 5 MG/1
5 TABLET ORAL DAILY
Qty: 90 TABLET | Refills: 0 | Status: SHIPPED | OUTPATIENT
Start: 2021-09-13 | End: 2021-12-06

## 2021-09-26 ENCOUNTER — HEALTH MAINTENANCE LETTER (OUTPATIENT)
Age: 51
End: 2021-09-26

## 2021-09-29 DIAGNOSIS — Z13.220 LIPID SCREENING: ICD-10-CM

## 2021-09-29 DIAGNOSIS — Z94.4 LIVER REPLACED BY TRANSPLANT (H): ICD-10-CM

## 2021-10-01 ENCOUNTER — LAB (OUTPATIENT)
Dept: LAB | Facility: CLINIC | Age: 51
End: 2021-10-01
Attending: INTERNAL MEDICINE
Payer: COMMERCIAL

## 2021-10-01 DIAGNOSIS — Z13.220 LIPID SCREENING: ICD-10-CM

## 2021-10-01 DIAGNOSIS — Z94.4 LIVER REPLACED BY TRANSPLANT (H): ICD-10-CM

## 2021-10-01 LAB
ALBUMIN SERPL-MCNC: 3.6 G/DL (ref 3.4–5)
ALP SERPL-CCNC: 70 U/L (ref 40–150)
ALT SERPL W P-5'-P-CCNC: 35 U/L (ref 0–70)
ANION GAP SERPL CALCULATED.3IONS-SCNC: 4 MMOL/L (ref 3–14)
AST SERPL W P-5'-P-CCNC: 20 U/L (ref 0–45)
BILIRUB DIRECT SERPL-MCNC: 0.3 MG/DL (ref 0–0.2)
BILIRUB SERPL-MCNC: 1.2 MG/DL (ref 0.2–1.3)
BUN SERPL-MCNC: 17 MG/DL (ref 7–30)
CALCIUM SERPL-MCNC: 9.1 MG/DL (ref 8.5–10.1)
CHLORIDE BLD-SCNC: 109 MMOL/L (ref 94–109)
CO2 SERPL-SCNC: 26 MMOL/L (ref 20–32)
CREAT SERPL-MCNC: 1.31 MG/DL (ref 0.66–1.25)
ERYTHROCYTE [DISTWIDTH] IN BLOOD BY AUTOMATED COUNT: 13.4 % (ref 10–15)
GFR SERPL CREATININE-BSD FRML MDRD: 63 ML/MIN/1.73M2
GLUCOSE BLD-MCNC: 88 MG/DL (ref 70–99)
HCT VFR BLD AUTO: 43.2 % (ref 40–53)
HGB BLD-MCNC: 14.1 G/DL (ref 13.3–17.7)
MCH RBC QN AUTO: 26.4 PG (ref 26.5–33)
MCHC RBC AUTO-ENTMCNC: 32.6 G/DL (ref 31.5–36.5)
MCV RBC AUTO: 81 FL (ref 78–100)
PLATELET # BLD AUTO: 239 10E3/UL (ref 150–450)
POTASSIUM BLD-SCNC: 4.1 MMOL/L (ref 3.4–5.3)
PROT SERPL-MCNC: 7.2 G/DL (ref 6.8–8.8)
RBC # BLD AUTO: 5.34 10E6/UL (ref 4.4–5.9)
SIROLIMUS BLD-MCNC: 4.9 UG/L (ref 5–15)
SODIUM SERPL-SCNC: 139 MMOL/L (ref 133–144)
TME LAST DOSE: ABNORMAL H
TME LAST DOSE: ABNORMAL H
WBC # BLD AUTO: 7.6 10E3/UL (ref 4–11)

## 2021-10-01 PROCEDURE — 80195 ASSAY OF SIROLIMUS: CPT | Mod: 90 | Performed by: PATHOLOGY

## 2021-10-01 PROCEDURE — 85027 COMPLETE CBC AUTOMATED: CPT | Performed by: PATHOLOGY

## 2021-10-01 PROCEDURE — 82248 BILIRUBIN DIRECT: CPT | Performed by: PATHOLOGY

## 2021-10-01 PROCEDURE — 36415 COLL VENOUS BLD VENIPUNCTURE: CPT | Performed by: PATHOLOGY

## 2021-10-01 PROCEDURE — 80053 COMPREHEN METABOLIC PANEL: CPT | Performed by: PATHOLOGY

## 2021-12-06 DIAGNOSIS — I15.1 HTN, KIDNEY TRANSPLANT RELATED: ICD-10-CM

## 2021-12-06 DIAGNOSIS — Z94.0 HTN, KIDNEY TRANSPLANT RELATED: ICD-10-CM

## 2021-12-06 RX ORDER — AMLODIPINE BESYLATE 5 MG/1
5 TABLET ORAL DAILY
Qty: 90 TABLET | Refills: 3 | Status: SHIPPED | OUTPATIENT
Start: 2021-12-06 | End: 2024-01-30

## 2021-12-09 ENCOUNTER — TELEPHONE (OUTPATIENT)
Dept: TRANSPLANT | Facility: CLINIC | Age: 51
End: 2021-12-09
Payer: COMMERCIAL

## 2021-12-09 NOTE — TELEPHONE ENCOUNTER
Call from Jarod.  He needs a root canal, wondering if he needs an antibiotic.  He doesn't have an infection.  I told him he doesn't need an antibiotic.    He has lost 80 pounds - he and Krysten are cooking differently and making good food choices.     Jarod is a leader in our liver transplant support group.  I took some time to review our transplant guidelines in regards to COVID which he could share with the group.  He participates in a weekly support group, thanked him for his dedication to this.

## 2021-12-31 ENCOUNTER — TELEPHONE (OUTPATIENT)
Dept: TRANSPLANT | Facility: CLINIC | Age: 51
End: 2021-12-31
Payer: COMMERCIAL

## 2021-12-31 DIAGNOSIS — Z48.298 AFTERCARE FOLLOWING ORGAN TRANSPLANT: ICD-10-CM

## 2021-12-31 DIAGNOSIS — Z94.0 KIDNEY TRANSPLANTED: Primary | ICD-10-CM

## 2021-12-31 NOTE — TELEPHONE ENCOUNTER
Dr Sanz requested patient move up 1/6 in person appt to 1/3 virtual if possible with labs 1/3 in the morning.  Left detailed message with patient with this information. Will request scheduling team to change 1/3 visit to virtual

## 2022-01-03 ENCOUNTER — VIRTUAL VISIT (OUTPATIENT)
Dept: NEPHROLOGY | Facility: CLINIC | Age: 52
End: 2022-01-03
Attending: INTERNAL MEDICINE
Payer: COMMERCIAL

## 2022-01-03 ENCOUNTER — LAB (OUTPATIENT)
Dept: LAB | Facility: CLINIC | Age: 52
End: 2022-01-03
Payer: COMMERCIAL

## 2022-01-03 DIAGNOSIS — Z94.0 KIDNEY TRANSPLANTED: ICD-10-CM

## 2022-01-03 DIAGNOSIS — Z48.298 AFTERCARE FOLLOWING ORGAN TRANSPLANT: Primary | ICD-10-CM

## 2022-01-03 DIAGNOSIS — Z13.220 LIPID SCREENING: ICD-10-CM

## 2022-01-03 DIAGNOSIS — Z94.4 LIVER REPLACED BY TRANSPLANT (H): ICD-10-CM

## 2022-01-03 DIAGNOSIS — D84.9 IMMUNOSUPPRESSION (H): ICD-10-CM

## 2022-01-03 DIAGNOSIS — Z48.298 AFTERCARE FOLLOWING ORGAN TRANSPLANT: ICD-10-CM

## 2022-01-03 LAB
ALBUMIN SERPL-MCNC: 3.6 G/DL (ref 3.4–5)
ALP SERPL-CCNC: 57 U/L (ref 40–150)
ALT SERPL W P-5'-P-CCNC: 48 U/L (ref 0–70)
ANION GAP SERPL CALCULATED.3IONS-SCNC: 9 MMOL/L (ref 3–14)
AST SERPL W P-5'-P-CCNC: 27 U/L (ref 0–45)
BILIRUB DIRECT SERPL-MCNC: 0.3 MG/DL (ref 0–0.2)
BILIRUB SERPL-MCNC: 1.1 MG/DL (ref 0.2–1.3)
BUN SERPL-MCNC: 16 MG/DL (ref 7–30)
CALCIUM SERPL-MCNC: 8.9 MG/DL (ref 8.5–10.1)
CHLORIDE BLD-SCNC: 109 MMOL/L (ref 94–109)
CO2 SERPL-SCNC: 24 MMOL/L (ref 20–32)
CREAT SERPL-MCNC: 1.23 MG/DL (ref 0.66–1.25)
CREAT UR-MCNC: 116 MG/DL
ERYTHROCYTE [DISTWIDTH] IN BLOOD BY AUTOMATED COUNT: 13.4 % (ref 10–15)
GFR SERPL CREATININE-BSD FRML MDRD: 71 ML/MIN/1.73M2
GLUCOSE BLD-MCNC: 96 MG/DL (ref 70–99)
HCT VFR BLD AUTO: 42.6 % (ref 40–53)
HGB BLD-MCNC: 13.9 G/DL (ref 13.3–17.7)
MCH RBC QN AUTO: 27.4 PG (ref 26.5–33)
MCHC RBC AUTO-ENTMCNC: 32.6 G/DL (ref 31.5–36.5)
MCV RBC AUTO: 84 FL (ref 78–100)
PLATELET # BLD AUTO: 236 10E3/UL (ref 150–450)
POTASSIUM BLD-SCNC: 3.8 MMOL/L (ref 3.4–5.3)
PROT SERPL-MCNC: 6.8 G/DL (ref 6.8–8.8)
PROT UR-MCNC: 0.34 G/L
PROT/CREAT 24H UR: 0.29 G/G CR (ref 0–0.2)
RBC # BLD AUTO: 5.08 10E6/UL (ref 4.4–5.9)
SIROLIMUS BLD-MCNC: 6.5 UG/L (ref 5–15)
SODIUM SERPL-SCNC: 142 MMOL/L (ref 133–144)
TME LAST DOSE: NORMAL H
TME LAST DOSE: NORMAL H
WBC # BLD AUTO: 6.6 10E3/UL (ref 4–11)

## 2022-01-03 PROCEDURE — 85027 COMPLETE CBC AUTOMATED: CPT | Performed by: PATHOLOGY

## 2022-01-03 PROCEDURE — 36415 COLL VENOUS BLD VENIPUNCTURE: CPT | Performed by: PATHOLOGY

## 2022-01-03 PROCEDURE — 80053 COMPREHEN METABOLIC PANEL: CPT | Performed by: PATHOLOGY

## 2022-01-03 PROCEDURE — 82248 BILIRUBIN DIRECT: CPT | Performed by: PATHOLOGY

## 2022-01-03 PROCEDURE — 99000 SPECIMEN HANDLING OFFICE-LAB: CPT | Performed by: PATHOLOGY

## 2022-01-03 PROCEDURE — 80195 ASSAY OF SIROLIMUS: CPT | Mod: 90 | Performed by: PATHOLOGY

## 2022-01-03 PROCEDURE — 84156 ASSAY OF PROTEIN URINE: CPT | Performed by: PATHOLOGY

## 2022-01-03 PROCEDURE — 99214 OFFICE O/P EST MOD 30 MIN: CPT | Mod: TEL | Performed by: INTERNAL MEDICINE

## 2022-01-03 RX ORDER — IBUPROFEN 200 MG
CAPSULE ORAL DAILY
COMMUNITY

## 2022-01-03 NOTE — PROGRESS NOTES
Converted to telephone visit (technical issues)  Start 3:11  Stop 3:31    Huseyin Sanz MD     CHRONIC TRANSPLANT NEPHROLOGY VISIT    Assessment & Plan   # DDKT (SLK): Stable   - Baseline Creatinine:  ~ 1.2 - 1.4   - Proteinuria: Normal (<0.2 grams)   - Date DSA Last Checked: Jan/2017      Latest DSA: No   - BK Viremia: Not checked recently due to time from transplant   - Kidney Tx Biopsy: No    # Liver Tx (K): Stable, liver function appears to be stable, T.deysi and INR on higher end of normal. Follows with transplant hepatology.     # Immunosuppression: Mycophenolate mofetil (dose 750 mg every 12 hours) and Sirolimus (goal 4-6)          - Continue with intensive monitoring of immunosuppression for efficacy and toxicity.          - Changes: No    # Infection Prophylaxis:   - PJP: None    # Hypertension: Borderline control;  Goal BP: < 130/80   - Changes:    # Blood glucose : will check hemoglobin A1C    # Hemoglobin : normal , previous work up for possible erythrocytosis (US native kidney with no masses), on ARB    # Mineral Bone Disorder:    - PTH not checked recently  - Vitamin D; level: Not checked recently        On supplement: Yes  - Calcium; level: Normal        On supplement: No   - Will check PTH and vitamin D.     # Overweight: he lost 15 lb since last visit.    - Recommend weight loss for overall health by increasing exercise and watching caloric intake.    # Skin Cancer Risk: follows Q6 months   - Discussed sun protection and recommend regular follow up with Dermatology.    # Cancer screening: colonoscopy 2020    # Medical Compliance: Yes    # Transplant History:  Etiology of Kidney Failure: Hepatorenal syndrome (HRS)  Tx: DDKT (K)  Transplant: 1/14/2013 (Kidney), 1/14/2013 (Liver)  Donor Type: Donation after Brain Death Donor Class: Standard Criteria Donor  Significant transplant-related complications: None    Transplant Office Phone Number: [phone]    Assessment and plan was discussed with  the patient and he voiced his understanding and agreement.    Return visit 1 year    Huseyin Sanz MD     Chief Complaint   Mr. Cherelle Quesada is a 51 year old here for kidney transplant and immunosuppression management.    History of Present Illness      Feels good overall  Lost a lot of weight ~intentional ~70-80 lbs over the past year. Dietary modifications, increased activity.   Energy level improved since. BP overall has been controlled  Stopped gabapentin as it was not helping his neuropathy and has caused him to feel unsteady (he was taking 11 tabs/d)  Current IS: sirolimus 0.5, /750  (switched from tacrolimus from neuropathy, brain fog, joint pain)    COVID vaccine: got J&J 1st then; Pfizer x2 early Nov.  Flu vaccine received    Home BP: ~118//77    Review of Systems   A comprehensive review of systems was obtained and negative, except as noted in the HPI or PMH.    Problem List   Patient Active Problem List   Diagnosis     Generalized anxiety disorder     Kidney replaced by transplant     Liver replaced by transplant (H)     Immunosuppression (H)     HTN, kidney transplant related     Secondary renal hyperparathyroidism (H)     Aftercare following organ transplant     Status post hip replacement, right     Vitamin D deficiency     Post-transplant erythrocytosis     Chronic kidney disease, stage 3 (H)       Social History   Social History     Tobacco Use     Smoking status: Never Smoker     Smokeless tobacco: Never Used   Substance Use Topics     Alcohol use: No     Comment: Occasional, rare     Drug use: No       Allergies   Allergies   Allergen Reactions     Paxil [Paroxetine] Other (See Comments)     Caused Severe Tremor     Cepacol Maximum Strength Rash     Chlorhexidine Rash     Phenol Rash       Medications   Current Outpatient Medications   Medication Sig     amLODIPine (NORVASC) 5 MG tablet Take 1 tablet (5 mg) by mouth daily     aspirin (ASA) 81 MG chewable tablet Take 81 mg by  mouth daily     CHOLECALCIFEROL PO Take 5,000 Units by mouth daily      losartan (COZAAR) 100 MG tablet Take 1 tablet (100 mg) by mouth At Bedtime     multivitamin, therapeutic with minerals (MULTI-VITAMIN) TABS tablet Take 1 tablet by mouth daily     mycophenolate (GENERIC EQUIVALENT) 250 MG capsule Take 3 capsules (750 mg) by mouth 2 times daily     sildenafil (VIAGRA) 50 MG tablet Take 0.5-1 tablets (25-50 mg) by mouth daily as needed for erectile dysfunction Take 30 min to 4 hours before intercourse.  Never use with nitroglycerin, terazosin or doxazosin.     sirolimus (GENERIC EQUIVALENT) 0.5 MG tablet Take 1 tablet (0.5 mg) by mouth daily     gabapentin (NEURONTIN) 100 MG capsule Take 6 capsules (600 mg) by mouth in the morning, take 5 capsules (500 mg) by mouth in the evening.     No current facility-administered medications for this visit.     There are no discontinued medications.    Physical Exam    Deferred telephone visit      Data     Renal Latest Ref Rng & Units 1/3/2022 10/1/2021 6/18/2021   Na 133 - 144 mmol/L 142 139 140   Na (external) 135 - 145 mmol/L - - -   K 3.4 - 5.3 mmol/L 3.8 4.1 4.7   K (external) 3.5 - 5.0 mmol/L - - -   Cl 94 - 109 mmol/L 109 109 109   Cl (external) 98 - 110 mmol/L - - -   CO2 20 - 32 mmol/L 24 26 27   CO2 (external) 21 - 31 mmol/L - - -   BUN 7 - 30 mg/dL 16 17 16   BUN (external) 8 - 25 mg/dL - - -   Cr 0.66 - 1.25 mg/dL 1.23 1.31(H) 1.36(H)   Cr (external) 0.72 - 1.25 mg/dL - - -   Glucose 70 - 99 mg/dL 96 88 91   Glucose (external) 65 - 100 mg/dL - - -   Ca  8.5 - 10.1 mg/dL 8.9 9.1 8.9   Ca (external) 8.5 - 10.5 mg/dL - - -   Mg 1.6 - 2.3 mg/dL - - -   Mg (external) 1.6 - 2.6 mg/dL - - -     Bone Health Latest Ref Rng & Units 7/24/2015 6/1/2015 1/22/2015   Phos 2.5 - 4.5 mg/dL 2.4(L) 3.1 1.9(L)   Phos (external) 2.3 - 4.7 mg/dL - - -   PTHi 12 - 72 pg/mL - - -     Heme Latest Ref Rng & Units 1/3/2022 10/1/2021 6/18/2021   WBC 4.0 - 11.0 10e3/uL 6.6 7.6 7.6   WBC  (external) 4.5 - 11.0 thou/cu mm - - -   Hgb 13.3 - 17.7 g/dL 13.9 14.1 14.4   Hgb (external) 13.5 - 17.5 g/dL - - -   Plt 150 - 450 10e3/uL 236 239 213   Plt (external) 140 - 440 thou/cu mm - - -   ABSOLUTE NEUTROPHIL 1.6 - 8.3 10e9/L - - -   ABSOLUTE NEUTROPHILS (EXTERNAL) 1.7 - 7.0 thou/cu mm - - -   ABSOLUTE LYMPHOCYTES 0.8 - 5.3 10e9/L - - -   ABSOLUTE LYMPHOCYTES (EXTERNAL) 0.9 - 2.9 thou/cu mm - - -   ABSOLUTE MONOCYTES 0.0 - 1.3 10e9/L - - -   ABSOLUTE MONOCYTES (EXTERNAL) <0.9 thou/cu mm - - -   ABSOLUTE EOSINOPHILS 0.0 - 0.7 10e9/L - - -   ABSOLUTE EOSINOPHILS (EXTERNAL) <0.5 thou/cu mm - - -   ABSOLUTE BASOPHILS 0.0 - 0.2 10e9/L - - -   ABSOLUTE BASOPHILS (EXTERNAL) <0.3 thou/cu mm - - -   ABS IMMATURE GRANULOCYTES 0 - 0.4 10e9/L - - -     Liver Latest Ref Rng & Units 1/3/2022 10/1/2021 6/18/2021   AP 40 - 150 U/L 57 70 67   AP (external) 50 - 136 U/L - - -   TBili 0.2 - 1.3 mg/dL 1.1 1.2 1.0   TBili (external) 0.2 - 1.2 mg/dL - - -   DBili 0.0 - 0.2 mg/dL 0.3(H) 0.3(H) 0.3(H)   DBili (external) 0.1 - 0.5 mg/dL - - -   ALT 0 - 70 U/L 48 35 30   ALT (external) 8 - 45 U/L - - -   AST 0 - 45 U/L 27 20 21   AST (external) 2 - 40 U/L - - -   Tot Protein 6.8 - 8.8 g/dL 6.8 7.2 7.0   Tot Protein (external) 6.0 - 8.0 g/dL - - -   Albumin 3.4 - 5.0 g/dL 3.6 3.6 3.7   Albumin (external) 3.5 - 5.2 g/dL - - -     Pancreas Latest Ref Rng & Units 1/15/2013 1/13/2013 1/6/2013   Amylase 30 - 110 U/L <30(L) 59 116(H)   Lipase 20 - 250 U/L 28 - 263(H)     Iron studies Latest Ref Rng & Units 1/8/2013 10/3/2012   Iron 35 - 180 ug/dL 15(L) 82   Iron sat 15 - 46 % 12(L) 70(H)   Ferritin 20 - 300 ng/mL 500(H) -     [facility] Txp Virology Latest Ref Rng & Units 2/3/2014 1/21/2014 1/30/2013   CVM DNA Quant - - - Whole blood, EDTA anticoagulant   CMV Quant <100 Copies/mL - - <100  No CMV DNA detected.   CMV QT Log <2.0 Log copies/mL - - <2.0  The Cytomegalovirus DNA Quantitation assay is a real-time polymerase chain   reaction (PCR)  utilizing analyte specific reagents manufactured by [employer]   [facility]. Analyte Specific Reagents (ASRs) are used in many laboratory   tests necessary for standard medical care and generally do not require FDA   approval.   This test was developed and its performance characteristics determined by   [facility].  It has not been   cleared or approved by the US Food and Drug Administration.   BK Spec - - - Plasma, EDTA anticoagulant   BK Res <1000 copies/mL - - <1000   BK Log <3.0 Log copies/mL - - <3.0  The lower limit of detection for this assay is 1000 copies/mL.  Real-time TaqMan   PCR was performed using BK primers and probe for the detection of a 90 bp   portion of the  1 gene.  The performance characteristics were validated by   the [facility].   It has not been cleared or approved by the U.S. Food and Drug Administration.   EBV VCA IGG ANTIBODY U/mL - - -   EBV VCA IGM ANTIBODY U/mL - - -   Hep B Core NEG - Negative -   Hep B Surf - 1.2 - -   HIV 1&2 NEG - - -            Recent Labs   Lab Test 05/15/17  0741 10/04/18  0820 10/15/18  0748   DOSA 8p 5.14.2017 Not Provided Not Provided   MPACID 1.20 0.97* 1.10   MPAG 51.8 70.7 76.6       Huseyin Sanz MD

## 2022-01-03 NOTE — PROGRESS NOTES
Jarod is a 51 year old who is being evaluated via a billable video visit.      How would you like to obtain your AVS? MyChart  If the video visit is dropped, the invitation should be resent by: Send to e-mail at: [email or web address]  Will anyone else be joining your video visit? No      Start 3:11  Stop 3:31  Huseyin Sanz MD

## 2022-01-03 NOTE — LETTER
1/3/2022     RE: Jarod Quesada  [address] [place] [address]     Dear Colleague,    Thank you for referring your patient, Jarod Quesada, to the [facility] [place] at [facility]. Please see a copy of my visit note below.    Jarod is a 51 year old who is being evaluated via a billable video visit.      How would you like to obtain your AVS? MyChart  If the video visit is dropped, the invitation should be resent by: Send to e-mail at: [email or web address]  Will anyone else be joining your video visit? No      Start 3:11  Stop 3:31  Huseyin Sanz MD      Converted to telephone visit (technical issues)  Start 3:11  Stop 3:31    Huseyin Sanz MD     CHRONIC TRANSPLANT NEPHROLOGY VISIT    Assessment & Plan   # DDKT (SLK): Stable   - Baseline Creatinine:  ~ 1.2 - 1.4   - Proteinuria: Normal (<0.2 grams)   - Date DSA Last Checked: Jan/2017      Latest DSA: No   - BK Viremia: Not checked recently due to time from transplant   - Kidney Tx Biopsy: No    # Liver Tx (K): Stable, liver function appears to be stable, T.deysi and INR on higher end of normal. Follows with transplant hepatology.     # Immunosuppression: Mycophenolate mofetil (dose 750 mg every 12 hours) and Sirolimus (goal 4-6)          - Continue with intensive monitoring of immunosuppression for efficacy and toxicity.          - Changes: No    # Infection Prophylaxis:   - PJP: None    # Hypertension: Borderline control;  Goal BP: < 130/80   - Changes:    # Blood glucose : will check hemoglobin A1C    # Hemoglobin : normal , previous work up for possible erythrocytosis (US native kidney with no masses), on ARB    # Mineral Bone Disorder:    - PTH not checked recently  - Vitamin D; level: Not checked recently        On supplement: Yes  - Calcium; level: Normal        On supplement: No   - Will check PTH and vitamin D.     # Overweight: he lost 15 lb since last  visit.    - Recommend weight loss for overall health by increasing exercise and watching caloric intake.    # Skin Cancer Risk: follows Q6 months   - Discussed sun protection and recommend regular follow up with Dermatology.    # Cancer screening: colonoscopy 2020    # Medical Compliance: Yes    # Transplant History:  Etiology of Kidney Failure: Hepatorenal syndrome (HRS)  Tx: DDKT (SLK)  Transplant: 1/14/2013 (Kidney), 1/14/2013 (Liver)  Donor Type: Donation after Brain Death Donor Class: Standard Criteria Donor  Significant transplant-related complications: None    Transplant Office Phone Number: [phone]    Assessment and plan was discussed with the patient and he voiced his understanding and agreement.    Return visit 1 year    Huseyin Sanz MD     Chief Complaint   Mr. Cherelle Quesada is a 51 year old here for kidney transplant and immunosuppression management.    History of Present Illness      Feels good overall  Lost a lot of weight ~intentional ~70-80 lbs over the past year. Dietary modifications, increased activity.   Energy level improved since. BP overall has been controlled  Stopped gabapentin as it was not helping his neuropathy and has caused him to feel unsteady (he was taking 11 tabs/d)  Current IS: sirolimus 0.5, /750  (switched from tacrolimus from neuropathy, brain fog, joint pain)    COVID vaccine: got J&J 1st then; Pfizer x2 early Nov.  Flu vaccine received    Home BP: ~118//77    Review of Systems   A comprehensive review of systems was obtained and negative, except as noted in the HPI or PMH.    Problem List   Patient Active Problem List   Diagnosis     Generalized anxiety disorder     Kidney replaced by transplant     Liver replaced by transplant (H)     Immunosuppression (H)     HTN, kidney transplant related     Secondary renal hyperparathyroidism (H)     Aftercare following organ transplant     Status post hip replacement, right     Vitamin D deficiency      Post-transplant erythrocytosis     Chronic kidney disease, stage 3 (H)       Social History   Social History     Tobacco Use     Smoking status: Never Smoker     Smokeless tobacco: Never Used   Substance Use Topics     Alcohol use: No     Comment: Occasional, rare     Drug use: No       Allergies   Allergies   Allergen Reactions     Paxil [Paroxetine] Other (See Comments)     Caused Severe Tremor     Cepacol Maximum Strength Rash     Chlorhexidine Rash     Phenol Rash       Medications   Current Outpatient Medications   Medication Sig     amLODIPine (NORVASC) 5 MG tablet Take 1 tablet (5 mg) by mouth daily     aspirin (ASA) 81 MG chewable tablet Take 81 mg by mouth daily     CHOLECALCIFEROL PO Take 5,000 Units by mouth daily      losartan (COZAAR) 100 MG tablet Take 1 tablet (100 mg) by mouth At Bedtime     multivitamin, therapeutic with minerals (MULTI-VITAMIN) TABS tablet Take 1 tablet by mouth daily     mycophenolate (GENERIC EQUIVALENT) 250 MG capsule Take 3 capsules (750 mg) by mouth 2 times daily     sildenafil (VIAGRA) 50 MG tablet Take 0.5-1 tablets (25-50 mg) by mouth daily as needed for erectile dysfunction Take 30 min to 4 hours before intercourse.  Never use with nitroglycerin, terazosin or doxazosin.     sirolimus (GENERIC EQUIVALENT) 0.5 MG tablet Take 1 tablet (0.5 mg) by mouth daily     gabapentin (NEURONTIN) 100 MG capsule Take 6 capsules (600 mg) by mouth in the morning, take 5 capsules (500 mg) by mouth in the evening.     No current facility-administered medications for this visit.     There are no discontinued medications.    Physical Exam    Deferred telephone visit      Data     Renal Latest Ref Rng & Units 1/3/2022 10/1/2021 6/18/2021   Na 133 - 144 mmol/L 142 139 140   Na (external) 135 - 145 mmol/L - - -   K 3.4 - 5.3 mmol/L 3.8 4.1 4.7   K (external) 3.5 - 5.0 mmol/L - - -   Cl 94 - 109 mmol/L 109 109 109   Cl (external) 98 - 110 mmol/L - - -   CO2 20 - 32 mmol/L 24 26 27   CO2 (external)  21 - 31 mmol/L - - -   BUN 7 - 30 mg/dL 16 17 16   BUN (external) 8 - 25 mg/dL - - -   Cr 0.66 - 1.25 mg/dL 1.23 1.31(H) 1.36(H)   Cr (external) 0.72 - 1.25 mg/dL - - -   Glucose 70 - 99 mg/dL 96 88 91   Glucose (external) 65 - 100 mg/dL - - -   Ca  8.5 - 10.1 mg/dL 8.9 9.1 8.9   Ca (external) 8.5 - 10.5 mg/dL - - -   Mg 1.6 - 2.3 mg/dL - - -   Mg (external) 1.6 - 2.6 mg/dL - - -     Bone Health Latest Ref Rng & Units 7/24/2015 6/1/2015 1/22/2015   Phos 2.5 - 4.5 mg/dL 2.4(L) 3.1 1.9(L)   Phos (external) 2.3 - 4.7 mg/dL - - -   PTHi 12 - 72 pg/mL - - -     Heme Latest Ref Rng & Units 1/3/2022 10/1/2021 6/18/2021   WBC 4.0 - 11.0 10e3/uL 6.6 7.6 7.6   WBC (external) 4.5 - 11.0 thou/cu mm - - -   Hgb 13.3 - 17.7 g/dL 13.9 14.1 14.4   Hgb (external) 13.5 - 17.5 g/dL - - -   Plt 150 - 450 10e3/uL 236 239 213   Plt (external) 140 - 440 thou/cu mm - - -   ABSOLUTE NEUTROPHIL 1.6 - 8.3 10e9/L - - -   ABSOLUTE NEUTROPHILS (EXTERNAL) 1.7 - 7.0 thou/cu mm - - -   ABSOLUTE LYMPHOCYTES 0.8 - 5.3 10e9/L - - -   ABSOLUTE LYMPHOCYTES (EXTERNAL) 0.9 - 2.9 thou/cu mm - - -   ABSOLUTE MONOCYTES 0.0 - 1.3 10e9/L - - -   ABSOLUTE MONOCYTES (EXTERNAL) <0.9 thou/cu mm - - -   ABSOLUTE EOSINOPHILS 0.0 - 0.7 10e9/L - - -   ABSOLUTE EOSINOPHILS (EXTERNAL) <0.5 thou/cu mm - - -   ABSOLUTE BASOPHILS 0.0 - 0.2 10e9/L - - -   ABSOLUTE BASOPHILS (EXTERNAL) <0.3 thou/cu mm - - -   ABS IMMATURE GRANULOCYTES 0 - 0.4 10e9/L - - -     Liver Latest Ref Rng & Units 1/3/2022 10/1/2021 6/18/2021   AP 40 - 150 U/L 57 70 67   AP (external) 50 - 136 U/L - - -   TBili 0.2 - 1.3 mg/dL 1.1 1.2 1.0   TBili (external) 0.2 - 1.2 mg/dL - - -   DBili 0.0 - 0.2 mg/dL 0.3(H) 0.3(H) 0.3(H)   DBili (external) 0.1 - 0.5 mg/dL - - -   ALT 0 - 70 U/L 48 35 30   ALT (external) 8 - 45 U/L - - -   AST 0 - 45 U/L 27 20 21   AST (external) 2 - 40 U/L - - -   Tot Protein 6.8 - 8.8 g/dL 6.8 7.2 7.0   Tot Protein (external) 6.0 - 8.0 g/dL - - -   Albumin 3.4 - 5.0 g/dL 3.6 3.6  3.7   Albumin (external) 3.5 - 5.2 g/dL - - -     Pancreas Latest Ref Rng & Units 1/15/2013 1/13/2013 1/6/2013   Amylase 30 - 110 U/L <30(L) 59 116(H)   Lipase 20 - 250 U/L 28 - 263(H)     Iron studies Latest Ref Rng & Units 1/8/2013 10/3/2012   Iron 35 - 180 ug/dL 15(L) 82   Iron sat 15 - 46 % 12(L) 70(H)   Ferritin 20 - 300 ng/mL 500(H) -     [facility] Txp Virology Latest Ref Rng & Units 2/3/2014 1/21/2014 1/30/2013   CVM DNA Quant - - - Whole blood, EDTA anticoagulant   CMV Quant <100 Copies/mL - - <100  No CMV DNA detected.   CMV QT Log <2.0 Log copies/mL - - <2.0  The Cytomegalovirus DNA Quantitation assay is a real-time polymerase chain   reaction (PCR) utilizing analyte specific reagents manufactured by [employer]   [facility]. Analyte Specific Reagents (ASRs) are used in many laboratory   tests necessary for standard medical care and generally do not require FDA   approval.   This test was developed and its performance characteristics determined by   [facility].  It has not been   cleared or approved by the US Food and Drug Administration.   BK Spec - - - Plasma, EDTA anticoagulant   BK Res <1000 copies/mL - - <1000   BK Log <3.0 Log copies/mL - - <3.0  The lower limit of detection for this assay is 1000 copies/mL.  Real-time TaqMan   PCR was performed using BK primers and probe for the detection of a 90 bp   portion of the  1 gene.  The performance characteristics were validated by   the [facility].   It has not been cleared or approved by the U.S. Food and Drug Administration.   EBV VCA IGG ANTIBODY U/mL - - -   EBV VCA IGM ANTIBODY U/mL - - -   Hep B Core NEG - Negative -   Hep B Surf - 1.2 - -   HIV 1&2 NEG - - -        Recent Labs   Lab Test 05/15/17  0741 10/04/18  0820 10/15/18  0748   DOSMPA 8p 5.14.2017 Not Provided Not Provided   MPACID 1.20 0.97* 1.10   MPAG 51.8 70.7 76.6       Huseyin Sanz MD

## 2022-01-09 NOTE — PROGRESS NOTES
[facility]  LIVER TRANSPLANT CLINIC FOLLOW UP  VIDEO VISIT     A/P  Mr. Quesada is a 51 Y M s/p SLK 1/14/13 for ETOH.  Medically doing extremely well.    IS Rapa and MMF. No changes to medications today.  Graft function Excellent Labs up to date and normal.     Obesity He has lost 85 pounds.   Proph Discussed skin cancer screening: UTD on derm  CRC screening Colonoscopy 7/6/20. Lymphoid aggregate  Vaccinations Shingles shot done 2019. Had the flu shot this year. Due for pneumococcal.  Covid vaccine Has had x 3.  RTC 1 y.    Jeannine Biggs MD  Hepatology/ Liver Transplant  [facility]  ==================================================================  PCP: Dr. Gil at [facility] [place].   Nephrology: Dr. Jalen Emerson     SUBJECTIVE  Mr. Quesada is 51 Y M s/p SLK 1/14/13 for ETOH.    Weight down about 80 pounds. His current weight is 189. He has done this through diet.   He is doing well.     EXPLANT: Cirrhosis, no HCC  IS: SRL and MMF  LABS: Up to date and normal liver tests and CBC     Lab Test 01/03/22  0826   PROTTOTAL 6.8   ALBUMIN 3.6   BILITOTAL 1.1   ALKPHOS 57   AST 27   ALT 48     Lab Test 01/03/22  0826   WBC 6.6   RBC 5.08   HGB 13.9   HCT 42.6   MCV 84   MCH 27.4   MCHC 32.6   RDW 13.4        REJECTION: None.  BILIARY ISSUES: None  STENT: No stent on abdominal imaging post transpalnt  KIDNEY FUNCTION: Sees Dr. Emerson. Stable. No proteinuria          Creatinine   Date Value Ref Range Status   01/03/2022 1.23 0.66 - 1.25 mg/dL Final   06/18/2021 1.36 (H) 0.66 - 1.25 mg/dL Final     BP: Controlled on amlodipine, metoprolol. Checks at home in the 120s/70s.   PREV: UTD on screening (colonoscopy 6/29/2012 no polyps, area of ulceration. Path showed nonspecific changes. Record is located in Media tab on 9/21/12)   Derm Feb 8/2019  Flu shot had it.  DISEASE RECURRENCE: No alcohol  OTHER ISSUES: Struggles with weight gain. Also has neuropathy, on gabapentin.   HLD  Incisional  "hernia not worse  NEW ISSUES:  R ROBERTA with Dr. Scott here in early November 2018.     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  Wife is working from home. Dtr Melonie is in Industry at [employer] and will graduate this year.  ROS: 10 point ROS neg other than the symptoms noted above in the HPI.  Exam  Gen Alert pleasant NAD  Resp No difficulty breathing. No cough  Skin No Jaundice  Eyes No icterus  Neuro STOUT  MSK no muscle wasting  Psyche Pleasant, appropriate. Well groomed.    Jarod Quesada is a 50 year old male who is being evaluated via a billable video visit.      The patient has been notified of following:   \"This video visit will be conducted via a call between you and your physician/provider. We have found that certain health care needs can be provided without the need for an in-person physical exam.  This service lets us provide the care you need with a video conversation.  If a prescription is necessary we can send it directly to your pharmacy.  If lab work is needed we can place an order for that and you can then stop by our lab to have the test done at a later time. Video visits are billed at different rates depending on your insurance coverage.  Please reach out to your insurance provider with any questions.  If during the course of the call the physician/provider feels a video visit is not appropriate, you will not be charged for this service.\"   Patient has given verbal consent for Video visit? Yes    Type of service:  Video Visit  Video Start Time: 902  Video End Time: 917  Patient location: home  Will anyone else be joining your video visit? wife  {If patient encounters technical issues they should call [phone] :1  Distant Location (provider location):  [facility] HEPATOLOGY CLINIC [place]   Mode of Communication:  Video Conference via [employer]  I have reviewed and updated the patient's Past Medical " History, Social History, Family History and Medication List.

## 2022-01-10 ENCOUNTER — VIRTUAL VISIT (OUTPATIENT)
Dept: GASTROENTEROLOGY | Facility: CLINIC | Age: 52
End: 2022-01-10
Attending: INTERNAL MEDICINE
Payer: COMMERCIAL

## 2022-01-10 DIAGNOSIS — Z94.4 LIVER REPLACED BY TRANSPLANT (H): Primary | ICD-10-CM

## 2022-01-10 PROCEDURE — 99214 OFFICE O/P EST MOD 30 MIN: CPT | Mod: 25 | Performed by: INTERNAL MEDICINE

## 2022-01-10 ASSESSMENT — PAIN SCALES - GENERAL: PAINLEVEL: NO PAIN (0)

## 2022-01-10 NOTE — LETTER
1/10/2022     RE: Jarod Quesada  [address] [place] [address]    Dear Colleague,    Thank you for referring your patient, Jarod Quesada, to the [facility]PATOLOG[facility] [place]. Please see a copy of my visit note below.    [facility]  LIVER TRANSPLANT CLINIC FOLLOW UP  VIDEO VISIT     A/P  Mr. Quesada is a 51 Y M s/p SLK 1/14/13 for ETOH.  Medically doing extremely well.    IS Rapa and MMF. No changes to medications today.  Graft function Excellent Labs up to date and normal.     Obesity He has lost 85 pounds.   Proph Discussed skin cancer screening: UTD on derm  CRC screening Colonoscopy 7/6/20. Lymphoid aggregate  Vaccinations Shingles shot done 2019. Had the flu shot this year. Due for pneumococcal.  Covid vaccine Has had x 3.  RTC 1 y.    Jeannine Biggs MD  Hepatology/ Liver Transplant  [facility]  ==================================================================  [facility]   Nephrology: Dr. Jalen Emerson     SUBJECTIVE  Mr. Quesada is 51 Y M s/p SLK 1/14/13 for ETOH.    Weight down about 80 pounds. His current weight is 189. He has done this through diet.   He is doing well.     EXPLANT: Cirrhosis, no HCC  IS: SRL and MMF  LABS: Up to date and normal liver tests and CBC     Lab Test 01/03/22  0826   PROTTOTAL 6.8   ALBUMIN 3.6   BILITOTAL 1.1   ALKPHOS 57   AST 27   ALT 48     Lab Test 01/03/22  0826   WBC 6.6   RBC 5.08   HGB 13.9   HCT 42.6   MCV 84   MCH 27.4   MCHC 32.6   RDW 13.4        REJECTION: None.  BILIARY ISSUES: None  STENT: No stent on abdominal imaging post transpalnt  KIDNEY FUNCTION: Sees Dr. Emerson. Stable. No proteinuria          Creatinine   Date Value Ref Range Status   01/03/2022 1.23 0.66 - 1.25 mg/dL Final   06/18/2021 1.36 (H) 0.66 - 1.25 mg/dL Final     BP: Controlled on amlodipine, metoprolol. Checks at home in the 120s/70s.   PREV: UTD on screening (colonoscopy 6/29/2012 no  "polyps, area of ulceration. Path showed nonspecific changes. Record is located in Media tab on 9/21/12)   Derm Feb 8/2019  Flu shot had it.  DISEASE RECURRENCE: No alcohol  OTHER ISSUES: Struggles with weight gain. Also has neuropathy, on gabapentin.   HLD  Incisional hernia not worse  NEW ISSUES:  R ROBERTA with Dr. Scott here in early November 2018.     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  Wife is working from home. Dtr Melonie is in [place] at [employer] and will graduate this year.  ROS: 10 point ROS neg other than the symptoms noted above in the HPI.  Exam  Gen Alert pleasant NAD  Resp No difficulty breathing. No cough  Skin No Jaundice  Eyes No icterus  Neuro STOUT  MSK no muscle wasting  Psyche Pleasant, appropriate. Well groomed.    Jarod Quesada is a 50 year old male who is being evaluated via a billable video visit.      The patient has been notified of following:   \"This video visit will be conducted via a call between you and your physician/provider. We have found that certain health care needs can be provided without the need for an in-person physical exam.  This service lets us provide the care you need with a video conversation.  If a prescription is necessary we can send it directly to your pharmacy.  If lab work is needed we can place an order for that and you can then stop by our lab to have the test done at a later time. Video visits are billed at different rates depending on your insurance coverage.  Please reach out to your insurance provider with any questions.  If during the course of the call the physician/provider feels a video visit is not appropriate, you will not be charged for this service.\"   Patient has given verbal consent for Video visit? Yes    Type of service:  Video Visit  Video Start Time: 902  Video End Time: 917  Patient location: home  Will anyone else be joining your video visit? wife  {If " patient encounters technical issues they should call [phone] :1  Distant Location (provider location):  [facility]PATOLOGY [facility] [place]   Mode of Communication:  Video Conference via Scandlines  I have reviewed and updated the patient's Past Medical History, Social History, Family History and Medication List.    Again, thank you for allowing me to participate in the care of your patient.      Sincerely,    Jeannine Biggs MD

## 2022-01-10 NOTE — PROGRESS NOTES
"Jarod is a 51 year old who is being evaluated via a billable video visit.      How would you like to obtain your AVS? MyChart  If the video visit is dropped, the invitation should be resent by: Text to cell phone: [phone]  Will anyone else be joining your video visit? No  {If patient encounters technical issues they should call [phone] :[id number]}    Video Start Time: {video visit start/end time for provider to select:152948}  Video-Visit Details    Type of service:  Video Visit    Video End Time:{video visit start/end time for provider to select:152948}    Originating Location (pt. Location): {video visit patient location:324992::\"Home\"}    Distant Location (provider location):  [facility] HEPATOLOGY [facility]IN[facility] [place]     Platform used for Video Visit: {Virtual Visit Platforms:308690::\"Ratio\"}    "

## 2022-03-18 ENCOUNTER — PATIENT OUTREACH (OUTPATIENT)
Dept: ONCOLOGY | Facility: CLINIC | Age: 52
End: 2022-03-18
Payer: COMMERCIAL

## 2022-03-18 NOTE — PROGRESS NOTES
Attempted to reach patient by phone to consent for Evushled appt Saturday 03/19/2022.  No answer. Did not leave message. Will attempt to contact patient again.   Dilcia Sebastian RN on 3/18/2022 at 12:42 PM

## 2022-03-18 NOTE — PROGRESS NOTES
Evusheld Consent   Confirmed patient received the Emergency Use Authorization Fact Sheet for Patients, Parents and Caregivers prior to receiving medication. We discussed the following risks and benefits of receiving EVUSHELD, as well as alternative treatments and the patient wished to proceed with EVUSHELD.     Providers believe the benefits of Evusheld outweigh the risks for all moderately to severely immunocompromised patients.      Benefits:   Evusheld is a synthetic antibody that provides protection against COVID-19 for 6 months and is recommended for patients that have a weakened immune system that may not be able to make antibodies themselves.     Risks:    There is a very small risk of allergic reactions and you should notify us if you have any symptoms of an allergic reaction after the injection. There were also some extremely rare cardiac events reported in the initial trials in people that already had heart disease, but experts do not think these were caused by the medication. We will observe you for any chest pain or trouble breathing after the injections and you can reach out to your provider if any of these symptoms develop.     Alternatives:   Vaccines to prevent COVID-19 are approved or available under Emergency Use Authorization. Use of EVUSHELD does not replace vaccination against COVID-19. You can continue to mask and isolate to avoid infections. It is your choice to receive or not receive EVUSHELD. Should you decide not to receive EVUSHELD, it will not change your standard medical care.     Patient does consent to the injection.

## 2022-03-19 ENCOUNTER — INFUSION THERAPY VISIT (OUTPATIENT)
Dept: ONCOLOGY | Facility: CLINIC | Age: 52
End: 2022-03-19
Payer: COMMERCIAL

## 2022-03-19 VITALS
SYSTOLIC BLOOD PRESSURE: 137 MMHG | OXYGEN SATURATION: 98 % | HEART RATE: 87 BPM | TEMPERATURE: 98.4 F | DIASTOLIC BLOOD PRESSURE: 87 MMHG

## 2022-03-19 DIAGNOSIS — Z94.4 LIVER REPLACED BY TRANSPLANT (H): ICD-10-CM

## 2022-03-19 DIAGNOSIS — Z94.0 KIDNEY REPLACED BY TRANSPLANT: Primary | ICD-10-CM

## 2022-03-19 PROCEDURE — M0220 HC INJECTION TIXAGEVIMAB & CILGAVIMAB (EVUSHELD): HCPCS

## 2022-03-19 PROCEDURE — 250N000011 HC RX IP 250 OP 636: Performed by: INTERNAL MEDICINE

## 2022-03-19 PROCEDURE — 96372 THER/PROPH/DIAG INJ SC/IM: CPT | Performed by: INTERNAL MEDICINE

## 2022-03-19 RX ORDER — MEPERIDINE HYDROCHLORIDE 25 MG/ML
25 INJECTION INTRAMUSCULAR; INTRAVENOUS; SUBCUTANEOUS EVERY 30 MIN PRN
Status: CANCELLED | OUTPATIENT
Start: 2022-03-19

## 2022-03-19 RX ORDER — MEPERIDINE HYDROCHLORIDE 25 MG/ML
25 INJECTION INTRAMUSCULAR; INTRAVENOUS; SUBCUTANEOUS EVERY 30 MIN PRN
Status: DISCONTINUED | OUTPATIENT
Start: 2022-03-19 | End: 2022-03-19 | Stop reason: HOSPADM

## 2022-03-19 RX ORDER — NALOXONE HYDROCHLORIDE 0.4 MG/ML
0.2 INJECTION, SOLUTION INTRAMUSCULAR; INTRAVENOUS; SUBCUTANEOUS
Status: DISCONTINUED | OUTPATIENT
Start: 2022-03-19 | End: 2022-03-19 | Stop reason: HOSPADM

## 2022-03-19 RX ORDER — ALBUTEROL SULFATE 90 UG/1
1-2 AEROSOL, METERED RESPIRATORY (INHALATION)
Status: CANCELLED
Start: 2022-03-19

## 2022-03-19 RX ORDER — EPINEPHRINE 1 MG/ML
0.3 INJECTION, SOLUTION INTRAMUSCULAR; SUBCUTANEOUS EVERY 5 MIN PRN
Status: CANCELLED | OUTPATIENT
Start: 2022-03-19

## 2022-03-19 RX ORDER — ALBUTEROL SULFATE 0.83 MG/ML
2.5 SOLUTION RESPIRATORY (INHALATION)
Status: DISCONTINUED | OUTPATIENT
Start: 2022-03-19 | End: 2022-03-19 | Stop reason: HOSPADM

## 2022-03-19 RX ORDER — DIPHENHYDRAMINE HYDROCHLORIDE 50 MG/ML
50 INJECTION INTRAMUSCULAR; INTRAVENOUS
Status: DISCONTINUED | OUTPATIENT
Start: 2022-03-19 | End: 2022-03-19 | Stop reason: HOSPADM

## 2022-03-19 RX ORDER — ALBUTEROL SULFATE 90 UG/1
1-2 AEROSOL, METERED RESPIRATORY (INHALATION)
Status: DISCONTINUED | OUTPATIENT
Start: 2022-03-19 | End: 2022-03-19 | Stop reason: HOSPADM

## 2022-03-19 RX ORDER — EPINEPHRINE 1 MG/ML
0.3 INJECTION, SOLUTION INTRAMUSCULAR; SUBCUTANEOUS EVERY 5 MIN PRN
Status: DISCONTINUED | OUTPATIENT
Start: 2022-03-19 | End: 2022-03-19 | Stop reason: HOSPADM

## 2022-03-19 RX ORDER — METHYLPREDNISOLONE SODIUM SUCCINATE 125 MG/2ML
125 INJECTION, POWDER, LYOPHILIZED, FOR SOLUTION INTRAMUSCULAR; INTRAVENOUS
Status: DISCONTINUED | OUTPATIENT
Start: 2022-03-19 | End: 2022-03-19 | Stop reason: HOSPADM

## 2022-03-19 RX ORDER — DIPHENHYDRAMINE HYDROCHLORIDE 50 MG/ML
50 INJECTION INTRAMUSCULAR; INTRAVENOUS
Status: CANCELLED
Start: 2022-03-19

## 2022-03-19 RX ORDER — METHYLPREDNISOLONE SODIUM SUCCINATE 125 MG/2ML
125 INJECTION, POWDER, LYOPHILIZED, FOR SOLUTION INTRAMUSCULAR; INTRAVENOUS
Status: CANCELLED
Start: 2022-03-19

## 2022-03-19 RX ORDER — ALBUTEROL SULFATE 0.83 MG/ML
2.5 SOLUTION RESPIRATORY (INHALATION)
Status: CANCELLED | OUTPATIENT
Start: 2022-03-19

## 2022-03-19 RX ORDER — NALOXONE HYDROCHLORIDE 0.4 MG/ML
0.2 INJECTION, SOLUTION INTRAMUSCULAR; INTRAVENOUS; SUBCUTANEOUS
Status: CANCELLED | OUTPATIENT
Start: 2022-03-19

## 2022-03-19 RX ADMIN — Medication: at 07:21

## 2022-03-19 NOTE — NURSING NOTE
KAIN Administration Note:  Jarod Quesada presents today for KAIN.   present during visit today: Not Applicable.    Consent:   Informed Consent confirmed in chart, obtained on this date: 03/18/22    Post Injection Assessment:   Patient tolerated injection without incident.      Patient was observed in the room for a minimum of 10 minutes after injection per standard, then remained in the buidling for a total 60 minute observation period.         Discharge Plan:    Patient discharged in stable condition accompanied by: self.     Marita Barth CMA on 3/19/2022 at 7:32 AM

## 2022-03-22 ENCOUNTER — TELEPHONE (OUTPATIENT)
Dept: TRANSPLANT | Facility: CLINIC | Age: 52
End: 2022-03-22
Payer: COMMERCIAL

## 2022-03-22 NOTE — TELEPHONE ENCOUNTER
Pt reports that he had evusheld on Saturday 3/19/22. Added to checklist. Patient to call with any changes or concerns

## 2022-03-31 ENCOUNTER — DOCUMENTATION ONLY (OUTPATIENT)
Dept: TRANSPLANT | Facility: CLINIC | Age: 52
End: 2022-03-31
Payer: COMMERCIAL

## 2022-03-31 NOTE — LETTER
March 31, 2022    Jarod Quesada  [address] [place] [address]    Dear ,    I hope this letter finds you well.      2021 was a record year for the Liver Transplant Program.  We performed a record 107 adult liver transplants and we continue to grow!    In order to support the growth of our program we were able to add an additional Liver Transplant Coordinator to the team.  Rachel has a strong clinical background and comes to us from the Emergency Room at [facility] where she has worked for the past 7 years.  I have had the pleasure of partnering with Rachel and have been very impressed with her expertise, kindness and professionalism.    I'm excited to share that Rachel will be assigned as your Transplant Coordinator moving forward.  The transplant coordinators work very closely together to help one another and cover one another's time off so I will be available to support Rachel in this transition and with your ongoing care.  It has been an absolute privilege to have been involved in your post transplant care.  I have no doubt that you will be very happy with Rachel's care.  Please don't hesitate to give me a call if you have concerns or questions.    Sincerely,     Catherine Hutchinson RN  Liver Transplant Coordinator

## 2022-05-07 ENCOUNTER — HEALTH MAINTENANCE LETTER (OUTPATIENT)
Age: 52
End: 2022-05-07

## 2022-05-20 DIAGNOSIS — I15.1 HTN, KIDNEY TRANSPLANT RELATED: ICD-10-CM

## 2022-05-20 DIAGNOSIS — Z94.0 HTN, KIDNEY TRANSPLANT RELATED: ICD-10-CM

## 2022-05-20 DIAGNOSIS — Z94.4 LIVER TRANSPLANTED (H): ICD-10-CM

## 2022-05-20 DIAGNOSIS — Z94.0 KIDNEY REPLACED BY TRANSPLANT: ICD-10-CM

## 2022-05-20 RX ORDER — LOSARTAN POTASSIUM 100 MG/1
100 TABLET ORAL AT BEDTIME
Qty: 90 TABLET | Refills: 3 | Status: SHIPPED | OUTPATIENT
Start: 2022-05-20

## 2022-05-20 RX ORDER — MYCOPHENOLATE MOFETIL 250 MG/1
750 CAPSULE ORAL 2 TIMES DAILY
Qty: 540 CAPSULE | Refills: 3 | Status: SHIPPED | OUTPATIENT
Start: 2022-05-20 | End: 2023-04-20

## 2022-05-20 RX ORDER — SIROLIMUS 0.5 MG/1
0.5 TABLET, FILM COATED ORAL DAILY
Qty: 90 TABLET | Refills: 3 | Status: SHIPPED | OUTPATIENT
Start: 2022-05-20 | End: 2023-04-20

## 2022-06-08 ENCOUNTER — TRANSCRIBE ORDERS (OUTPATIENT)
Dept: GASTROENTEROLOGY | Facility: CLINIC | Age: 52
End: 2022-06-08
Payer: COMMERCIAL

## 2022-06-08 DIAGNOSIS — Z94.0 HTN, KIDNEY TRANSPLANT RELATED: Primary | ICD-10-CM

## 2022-06-08 DIAGNOSIS — I15.1 HTN, KIDNEY TRANSPLANT RELATED: Primary | ICD-10-CM

## 2022-06-09 ENCOUNTER — TRANSCRIBE ORDERS (OUTPATIENT)
Dept: OTHER | Age: 52
End: 2022-06-09
Payer: COMMERCIAL

## 2022-06-09 DIAGNOSIS — Z94.0 HTN, KIDNEY TRANSPLANT RELATED: Primary | ICD-10-CM

## 2022-06-09 DIAGNOSIS — I15.1 HTN, KIDNEY TRANSPLANT RELATED: Primary | ICD-10-CM

## 2022-07-14 ENCOUNTER — DOCUMENTATION ONLY (OUTPATIENT)
Dept: TRANSPLANT | Facility: CLINIC | Age: 52
End: 2022-07-14

## 2022-07-14 NOTE — PROGRESS NOTES
Annual chart review completed.          Patient is up to date with appts, not labs.         Letter sent reminding to have lab testing every 3 months.

## 2022-07-16 ENCOUNTER — LAB (OUTPATIENT)
Dept: LAB | Facility: CLINIC | Age: 52
End: 2022-07-16
Payer: COMMERCIAL

## 2022-07-16 DIAGNOSIS — Z13.220 LIPID SCREENING: ICD-10-CM

## 2022-07-16 DIAGNOSIS — Z94.4 LIVER REPLACED BY TRANSPLANT (H): ICD-10-CM

## 2022-07-16 LAB
ALBUMIN SERPL-MCNC: 3.7 G/DL (ref 3.4–5)
ALBUMIN UR-MCNC: NEGATIVE MG/DL
ALP SERPL-CCNC: 54 U/L (ref 40–150)
ALT SERPL W P-5'-P-CCNC: 33 U/L (ref 0–70)
ANION GAP SERPL CALCULATED.3IONS-SCNC: 5 MMOL/L (ref 3–14)
APPEARANCE UR: CLEAR
AST SERPL W P-5'-P-CCNC: 20 U/L (ref 0–45)
BILIRUB DIRECT SERPL-MCNC: 0.3 MG/DL (ref 0–0.2)
BILIRUB SERPL-MCNC: 1.2 MG/DL (ref 0.2–1.3)
BILIRUB UR QL STRIP: NEGATIVE
BUN SERPL-MCNC: 18 MG/DL (ref 7–30)
CALCIUM SERPL-MCNC: 9.3 MG/DL (ref 8.5–10.1)
CHLORIDE BLD-SCNC: 108 MMOL/L (ref 94–109)
CHOLEST SERPL-MCNC: 140 MG/DL
CO2 SERPL-SCNC: 26 MMOL/L (ref 20–32)
COLOR UR AUTO: YELLOW
CREAT SERPL-MCNC: 1.26 MG/DL (ref 0.66–1.25)
CREAT UR-MCNC: 55 MG/DL
ERYTHROCYTE [DISTWIDTH] IN BLOOD BY AUTOMATED COUNT: 13.2 % (ref 10–15)
FASTING STATUS PATIENT QL REPORTED: YES
GFR SERPL CREATININE-BSD FRML MDRD: 69 ML/MIN/1.73M2
GLUCOSE BLD-MCNC: 92 MG/DL (ref 70–99)
GLUCOSE UR STRIP-MCNC: NEGATIVE MG/DL
HCT VFR BLD AUTO: 42.5 % (ref 40–53)
HDLC SERPL-MCNC: 44 MG/DL
HGB BLD-MCNC: 13.7 G/DL (ref 13.3–17.7)
HGB UR QL STRIP: NEGATIVE
KETONES UR STRIP-MCNC: NEGATIVE MG/DL
LDLC SERPL CALC-MCNC: 77 MG/DL
LEUKOCYTE ESTERASE UR QL STRIP: NEGATIVE
MCH RBC QN AUTO: 26.6 PG (ref 26.5–33)
MCHC RBC AUTO-ENTMCNC: 32.2 G/DL (ref 31.5–36.5)
MCV RBC AUTO: 82 FL (ref 78–100)
NITRATE UR QL: NEGATIVE
NONHDLC SERPL-MCNC: 96 MG/DL
PH UR STRIP: 6 [PH] (ref 5–7)
PLATELET # BLD AUTO: 244 10E3/UL (ref 150–450)
POTASSIUM BLD-SCNC: 3.8 MMOL/L (ref 3.4–5.3)
PROT SERPL-MCNC: 6.9 G/DL (ref 6.8–8.8)
PROT UR-MCNC: 0.07 G/L
PROT/CREAT 24H UR: 0.13 G/G CR (ref 0–0.2)
RBC # BLD AUTO: 5.16 10E6/UL (ref 4.4–5.9)
SIROLIMUS BLD-MCNC: 7.4 UG/L (ref 5–15)
SODIUM SERPL-SCNC: 139 MMOL/L (ref 133–144)
SP GR UR STRIP: 1.01 (ref 1–1.03)
TME LAST DOSE: NORMAL H
TME LAST DOSE: NORMAL H
TRIGL SERPL-MCNC: 97 MG/DL
UROBILINOGEN UR STRIP-MCNC: NORMAL MG/DL
WBC # BLD AUTO: 5.9 10E3/UL (ref 4–11)

## 2022-07-16 PROCEDURE — 80053 COMPREHEN METABOLIC PANEL: CPT | Performed by: PATHOLOGY

## 2022-07-16 PROCEDURE — 82248 BILIRUBIN DIRECT: CPT | Performed by: PATHOLOGY

## 2022-07-16 PROCEDURE — 80195 ASSAY OF SIROLIMUS: CPT | Mod: 90 | Performed by: PATHOLOGY

## 2022-07-16 PROCEDURE — 84156 ASSAY OF PROTEIN URINE: CPT | Performed by: PATHOLOGY

## 2022-07-16 PROCEDURE — 99000 SPECIMEN HANDLING OFFICE-LAB: CPT | Performed by: PATHOLOGY

## 2022-07-16 PROCEDURE — 80061 LIPID PANEL: CPT | Performed by: PATHOLOGY

## 2022-07-16 PROCEDURE — 36415 COLL VENOUS BLD VENIPUNCTURE: CPT | Performed by: PATHOLOGY

## 2022-07-16 PROCEDURE — 81003 URINALYSIS AUTO W/O SCOPE: CPT | Performed by: PATHOLOGY

## 2022-07-16 PROCEDURE — 85027 COMPLETE CBC AUTOMATED: CPT | Performed by: PATHOLOGY

## 2023-01-14 ENCOUNTER — LAB (OUTPATIENT)
Dept: LAB | Facility: CLINIC | Age: 53
End: 2023-01-14
Payer: COMMERCIAL

## 2023-01-14 DIAGNOSIS — Z94.4 LIVER REPLACED BY TRANSPLANT (H): ICD-10-CM

## 2023-01-14 DIAGNOSIS — Z13.220 LIPID SCREENING: ICD-10-CM

## 2023-01-14 LAB
ALBUMIN SERPL BCG-MCNC: 4.2 G/DL (ref 3.5–5.2)
ALP SERPL-CCNC: 47 U/L (ref 40–129)
ALT SERPL W P-5'-P-CCNC: 26 U/L (ref 10–50)
ANION GAP SERPL CALCULATED.3IONS-SCNC: 8 MMOL/L (ref 7–15)
AST SERPL W P-5'-P-CCNC: 23 U/L (ref 10–50)
BILIRUB DIRECT SERPL-MCNC: 0.26 MG/DL (ref 0–0.3)
BILIRUB SERPL-MCNC: 1.1 MG/DL
BUN SERPL-MCNC: 15.5 MG/DL (ref 6–20)
CALCIUM SERPL-MCNC: 9.3 MG/DL (ref 8.6–10)
CHLORIDE SERPL-SCNC: 106 MMOL/L (ref 98–107)
CREAT SERPL-MCNC: 1.25 MG/DL (ref 0.67–1.17)
DEPRECATED HCO3 PLAS-SCNC: 25 MMOL/L (ref 22–29)
ERYTHROCYTE [DISTWIDTH] IN BLOOD BY AUTOMATED COUNT: 13.2 % (ref 10–15)
GFR SERPL CREATININE-BSD FRML MDRD: 69 ML/MIN/1.73M2
GLUCOSE SERPL-MCNC: 100 MG/DL (ref 70–99)
HCT VFR BLD AUTO: 42.9 % (ref 40–53)
HGB BLD-MCNC: 14 G/DL (ref 13.3–17.7)
MCH RBC QN AUTO: 27.1 PG (ref 26.5–33)
MCHC RBC AUTO-ENTMCNC: 32.6 G/DL (ref 31.5–36.5)
MCV RBC AUTO: 83 FL (ref 78–100)
PLATELET # BLD AUTO: 247 10E3/UL (ref 150–450)
POTASSIUM SERPL-SCNC: 3.9 MMOL/L (ref 3.4–5.3)
PROT SERPL-MCNC: 6.5 G/DL (ref 6.4–8.3)
RBC # BLD AUTO: 5.17 10E6/UL (ref 4.4–5.9)
SIROLIMUS BLD-MCNC: 4.1 UG/L (ref 5–15)
SODIUM SERPL-SCNC: 139 MMOL/L (ref 136–145)
TME LAST DOSE: ABNORMAL H
TME LAST DOSE: ABNORMAL H
WBC # BLD AUTO: 5.6 10E3/UL (ref 4–11)

## 2023-01-14 PROCEDURE — 85027 COMPLETE CBC AUTOMATED: CPT | Performed by: PATHOLOGY

## 2023-01-14 PROCEDURE — 36415 COLL VENOUS BLD VENIPUNCTURE: CPT | Performed by: PATHOLOGY

## 2023-01-14 PROCEDURE — 82248 BILIRUBIN DIRECT: CPT | Performed by: PATHOLOGY

## 2023-01-14 PROCEDURE — 80195 ASSAY OF SIROLIMUS: CPT | Mod: 90 | Performed by: PATHOLOGY

## 2023-01-14 PROCEDURE — 99000 SPECIMEN HANDLING OFFICE-LAB: CPT | Performed by: PATHOLOGY

## 2023-01-14 PROCEDURE — 80053 COMPREHEN METABOLIC PANEL: CPT | Performed by: PATHOLOGY

## 2023-01-16 ENCOUNTER — TELEPHONE (OUTPATIENT)
Dept: TRANSPLANT | Facility: CLINIC | Age: 53
End: 2023-01-16

## 2023-01-16 DIAGNOSIS — Z94.4 LIVER REPLACED BY TRANSPLANT (H): Primary | ICD-10-CM

## 2023-01-19 DIAGNOSIS — Z94.0 KIDNEY TRANSPLANTED: Primary | ICD-10-CM

## 2023-01-19 DIAGNOSIS — Z48.298 AFTERCARE FOLLOWING ORGAN TRANSPLANT: ICD-10-CM

## 2023-02-13 NOTE — PROGRESS NOTES
[facility]  LIVER TRANSPLANT CLINIC FOLLOW UP       A/P  Mr. Quesada is a 52 Y M s/p SLK 1/14/13 for ETOH.  Medically doing extremely well.    IS Rapa and MMF. No changes to medications today.  Graft function Excellent Labs up to date and normal.     Obesity He has lost over 90 pounds.   Proph Discussed skin cancer screening: UTD on derm  HTN on cozaar and amlodipine. High today in clinic. At home he sees lower numbers. He will monitor at home and d/w Dr. Timmons.  CRC screening Colonoscopy 7/6/20. Lymphoid aggregate  Vaccinations Shingles shot done 2019. Had the flu shot this year. Due for pneumococcal.  Covid vaccine Has had x 3.  RTC 1 y.    Jeannine Biggs MD  Hepatology/ Liver Transplant  [facility]  ==================================================================  PCP: Dr. Gil at [facility].   Nephrology: Dr. Jalen Emerson     SUBJECTIVE  Mr. Quesada is 52 Y M s/p SLK 1/14/13 for ETOH.    Weight down about 90 pounds. His current weight is 189. He has done this through diet.   He is doing well.     EXPLANT: Cirrhosis, no HCC  IS: SRL and MMF  LABS: Up to date and normal liver tests and CBC    ab Test 01/14/23  0741   PROTTOTAL 6.5   ALBUMIN 4.2   BILITOTAL 1.1   ALKPHOS 47   AST 23   ALT 26     ab Test 01/14/23  0741   WBC 5.6   RBC 5.17   HGB 14.0   HCT 42.9   MCV 83   MCH 27.1   MCHC 32.6   RDW 13.2          Lab Test 01/03/22  0826   PROTTOTAL 6.8   ALBUMIN 3.6   BILITOTAL 1.1   ALKPHOS 57   AST 27   ALT 48     Lab Test 01/03/22  0826   WBC 6.6   RBC 5.08   HGB 13.9   HCT 42.6   MCV 84   MCH 27.4   MCHC 32.6   RDW 13.4        REJECTION: None.  BILIARY ISSUES: None  STENT: No stent on abdominal imaging post transpalnt  KIDNEY FUNCTION: Sees Dr. Emerson. Stable. No proteinuria          Creatinine   Date Value Ref Range Status   01/14/2023 1.25 (H) 0.67 - 1.17 mg/dL Final   06/18/2021 1.36 (H) 0.66 - 1.25 mg/dL Final     BP: Controlled on amlodipine, metoprolol.  Checks at home in the 120s/70s.   PREV: UTD on screening (colonoscopy 6/29/2012 no polyps, area of ulceration. Path showed nonspecific changes. Record is located in Media tab on 9/21/12)   Derm Feb 8/2019  Flu shot had it.  DISEASE RECURRENCE: No alcohol  OTHER ISSUES: Struggles with weight gain. Also has neuropathy, on gabapentin.   HLD  Incisional hernia not worse  NEW ISSUES:  R ROBERTA with Dr. Scott here in early November 2018.     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  Dtr Fela graduated and is living in [place]. Son is a senior in .  ROS: 10 point ROS neg other than the symptoms noted above in the HPI.  Exam  BP (!) 180/108   Pulse 86   Wt 88 kg (194 lb)   SpO2 98%   BMI 26.31 kg/m      Gen Alert pleasant NAD  Resp No difficulty breathing. No cough  Skin No Jaundice  Eyes No icterus  Neuro STOUT  MSK no muscle wasting  Psyche Pleasant, appropriate. Well groomed.ly History and Medication List.

## 2023-02-14 ENCOUNTER — OFFICE VISIT (OUTPATIENT)
Dept: GASTROENTEROLOGY | Facility: CLINIC | Age: 53
End: 2023-02-14
Attending: INTERNAL MEDICINE
Payer: COMMERCIAL

## 2023-02-14 VITALS
WEIGHT: 194 LBS | BODY MASS INDEX: 26.31 KG/M2 | HEART RATE: 86 BPM | DIASTOLIC BLOOD PRESSURE: 108 MMHG | SYSTOLIC BLOOD PRESSURE: 180 MMHG | OXYGEN SATURATION: 98 %

## 2023-02-14 DIAGNOSIS — D84.9 IMMUNOSUPPRESSED STATUS (H): Primary | ICD-10-CM

## 2023-02-14 DIAGNOSIS — Z94.4 LIVER REPLACED BY TRANSPLANT (H): ICD-10-CM

## 2023-02-14 PROCEDURE — 99214 OFFICE O/P EST MOD 30 MIN: CPT | Performed by: INTERNAL MEDICINE

## 2023-02-14 PROCEDURE — G0463 HOSPITAL OUTPT CLINIC VISIT: HCPCS | Performed by: INTERNAL MEDICINE

## 2023-02-24 ENCOUNTER — LAB (OUTPATIENT)
Dept: LAB | Facility: CLINIC | Age: 53
End: 2023-02-24
Payer: COMMERCIAL

## 2023-02-24 DIAGNOSIS — Z94.0 KIDNEY TRANSPLANTED: ICD-10-CM

## 2023-02-24 DIAGNOSIS — Z48.298 AFTERCARE FOLLOWING ORGAN TRANSPLANT: ICD-10-CM

## 2023-02-24 DIAGNOSIS — Z94.4 LIVER REPLACED BY TRANSPLANT (H): ICD-10-CM

## 2023-02-24 LAB
ALBUMIN MFR UR ELPH: 9.6 MG/DL (ref 1–14)
ALBUMIN SERPL BCG-MCNC: 4.5 G/DL (ref 3.5–5.2)
ALP SERPL-CCNC: 47 U/L (ref 40–129)
ALT SERPL W P-5'-P-CCNC: 29 U/L (ref 10–50)
ANION GAP SERPL CALCULATED.3IONS-SCNC: 9 MMOL/L (ref 7–15)
AST SERPL W P-5'-P-CCNC: 27 U/L (ref 10–50)
BILIRUB DIRECT SERPL-MCNC: 0.27 MG/DL (ref 0–0.3)
BILIRUB SERPL-MCNC: 1.3 MG/DL
BUN SERPL-MCNC: 19.1 MG/DL (ref 6–20)
CALCIUM SERPL-MCNC: 9.8 MG/DL (ref 8.6–10)
CHLORIDE SERPL-SCNC: 102 MMOL/L (ref 98–107)
CREAT SERPL-MCNC: 1.27 MG/DL (ref 0.67–1.17)
CREAT UR-MCNC: 82.5 MG/DL
DEPRECATED HCO3 PLAS-SCNC: 26 MMOL/L (ref 22–29)
ERYTHROCYTE [DISTWIDTH] IN BLOOD BY AUTOMATED COUNT: 12.9 % (ref 10–15)
GFR SERPL CREATININE-BSD FRML MDRD: 68 ML/MIN/1.73M2
GLUCOSE SERPL-MCNC: 94 MG/DL (ref 70–99)
HCT VFR BLD AUTO: 44.7 % (ref 40–53)
HGB BLD-MCNC: 14.8 G/DL (ref 13.3–17.7)
MCH RBC QN AUTO: 27.4 PG (ref 26.5–33)
MCHC RBC AUTO-ENTMCNC: 33.1 G/DL (ref 31.5–36.5)
MCV RBC AUTO: 83 FL (ref 78–100)
PLATELET # BLD AUTO: 248 10E3/UL (ref 150–450)
POTASSIUM SERPL-SCNC: 4 MMOL/L (ref 3.4–5.3)
PROT SERPL-MCNC: 7.1 G/DL (ref 6.4–8.3)
PROT/CREAT 24H UR: 0.12 MG/MG CR (ref 0–0.2)
RBC # BLD AUTO: 5.4 10E6/UL (ref 4.4–5.9)
SIROLIMUS BLD-MCNC: 3.5 UG/L (ref 5–15)
SODIUM SERPL-SCNC: 137 MMOL/L (ref 136–145)
TME LAST DOSE: ABNORMAL H
TME LAST DOSE: ABNORMAL H
WBC # BLD AUTO: 5.7 10E3/UL (ref 4–11)

## 2023-02-24 PROCEDURE — 82248 BILIRUBIN DIRECT: CPT | Performed by: PATHOLOGY

## 2023-02-24 PROCEDURE — 80195 ASSAY OF SIROLIMUS: CPT | Mod: 90 | Performed by: PATHOLOGY

## 2023-02-24 PROCEDURE — 85027 COMPLETE CBC AUTOMATED: CPT | Performed by: PATHOLOGY

## 2023-02-24 PROCEDURE — 80053 COMPREHEN METABOLIC PANEL: CPT | Performed by: PATHOLOGY

## 2023-02-24 PROCEDURE — 99000 SPECIMEN HANDLING OFFICE-LAB: CPT | Performed by: PATHOLOGY

## 2023-02-24 PROCEDURE — 84156 ASSAY OF PROTEIN URINE: CPT | Performed by: PATHOLOGY

## 2023-02-24 PROCEDURE — 36415 COLL VENOUS BLD VENIPUNCTURE: CPT | Performed by: PATHOLOGY

## 2023-02-27 ENCOUNTER — TELEPHONE (OUTPATIENT)
Dept: NEPHROLOGY | Facility: CLINIC | Age: 53
End: 2023-02-27
Payer: COMMERCIAL

## 2023-02-27 ENCOUNTER — OFFICE VISIT (OUTPATIENT)
Dept: NEPHROLOGY | Facility: CLINIC | Age: 53
End: 2023-02-27
Attending: INTERNAL MEDICINE
Payer: COMMERCIAL

## 2023-02-27 VITALS
SYSTOLIC BLOOD PRESSURE: 154 MMHG | DIASTOLIC BLOOD PRESSURE: 96 MMHG | WEIGHT: 195.2 LBS | TEMPERATURE: 97.9 F | HEART RATE: 74 BPM | BODY MASS INDEX: 26.47 KG/M2 | OXYGEN SATURATION: 99 %

## 2023-02-27 DIAGNOSIS — N18.2 CKD (CHRONIC KIDNEY DISEASE) STAGE 2, GFR 60-89 ML/MIN: ICD-10-CM

## 2023-02-27 DIAGNOSIS — Z94.0 HTN, KIDNEY TRANSPLANT RELATED: ICD-10-CM

## 2023-02-27 DIAGNOSIS — D84.9 IMMUNOSUPPRESSION (H): ICD-10-CM

## 2023-02-27 DIAGNOSIS — E55.9 VITAMIN D DEFICIENCY: ICD-10-CM

## 2023-02-27 DIAGNOSIS — Z94.4 LIVER REPLACED BY TRANSPLANT (H): ICD-10-CM

## 2023-02-27 DIAGNOSIS — Z48.298 AFTERCARE FOLLOWING ORGAN TRANSPLANT: ICD-10-CM

## 2023-02-27 DIAGNOSIS — I15.1 HTN, KIDNEY TRANSPLANT RELATED: ICD-10-CM

## 2023-02-27 DIAGNOSIS — Z94.0 KIDNEY REPLACED BY TRANSPLANT: Primary | ICD-10-CM

## 2023-02-27 PROCEDURE — G0463 HOSPITAL OUTPT CLINIC VISIT: HCPCS | Performed by: INTERNAL MEDICINE

## 2023-02-27 PROCEDURE — 99214 OFFICE O/P EST MOD 30 MIN: CPT | Performed by: INTERNAL MEDICINE

## 2023-02-27 ASSESSMENT — PAIN SCALES - GENERAL: PAINLEVEL: NO PAIN (0)

## 2023-02-27 NOTE — PATIENT INSTRUCTIONS
Patient Recommendations:  - No new recommendations at this time.    Transplant Patient Information  Your Post Transplant Coordinator is: Bayron Murry  Your Liver Transplant Coordinator is: Rachel Valentin  For non urgent items, we encourage you to contact your coordinator/care team online via Rezzcard  You and your care team can also contact your transplant coordinator Monday - Friday, 8am - 5pm at [phone] (Option 2 to reach the coordinator or Option 4 to schedule an appointment).  After hours for urgent matters, please call [facility] at [phone].

## 2023-02-27 NOTE — NURSING NOTE
Chief Complaint   Patient presents with     RECHECK     Return visit.     Blood pressure (!) 154/96, pulse 74, temperature 97.9  F (36.6  C), weight 88.5 kg (195 lb 3.2 oz), SpO2 99 %.    RACHEL SANTOS

## 2023-02-27 NOTE — LETTER
2/27/2023       RE: Jarod Quesada  [address] [place] [address]     Dear Colleague,    Thank you for referring your patient, Jarod Quesada, to the [facility] NEPHROLOGY CLINIC [place] [facility]. Please see a copy of my visit note below.    TRANSPLANT NEPHROLOGY CHRONIC POST TRANSPLANT VISIT    Assessment & Plan   # DDKT (SLK): Stable   - Baseline Creatinine:  ~ 1.2-1.4   - Proteinuria: Normal (<0.2 grams)   - Date DSA Last Checked: Oct/2017      Latest DSA: Not checked recently due to time from transplant   - BK Viremia: Not checked recently due to time from transplant   - Kidney Tx Biopsy: No     # Liver Tx (SLK): Stable, normal liver function tests.  Follows with Hepatology.    # Immunosuppression: Mycophenolate mofetil (dose 750 mg every 12 hours) and Sirolimus (goal 4-6)   - Continue with intensive monitoring of immunosuppression for efficacy and toxicity.   - Changes: Not at this time    # Infection Prophylaxis:   Last CD4 Level: Not checked  - PJP: None    # Hypertension: Controlled;  Goal BP: < 130/80   - Changes: Not at this time    # Mineral Bone Disorder:   - Vitamin D; level: Not checked recently        On supplement: Yes  - Calcium; level: Normal        On supplement: Yes    # Skin Cancer Risk:    - Discussed sun protection and recommend regular follow up with Dermatology.    # Medical Compliance: Yes    # COVID-19 Virus Review: Discussed COVID-19 virus and the potential medical risks.  Reviewed preventative health recommendations, including wearing a mask where appropriate.  Recommended COVID vaccination should be up to date with either an initial vaccination or booster shot when appropriate.  Asked the patient to inform the transplant center if they are exposed or diagnosed with this virus.    # COVID Vaccination Up To Date: Yes    # Transplant History:  Etiology of Kidney Failure: Hepatorenal syndrome (HRS)  Tx:  DDKT (K) and Liver Tx (K)  Transplant: 1/14/2013 (Kidney), 1/14/2013 (Liver)  Significant changes in immunosuppression: Changed from tacrolimus to sirolimus due to significant neuropathy symptoms.  Significant transplant-related complications: None    Transplant Office Phone Number: [phone]    Assessment and plan was discussed with the patient and he voiced his understanding and agreement.    Return visit: Return in about 1 year (around 2/27/2024).    Doyle Timmons MD    Chief Complaint   Mr. Cherelle Quesada is a 52 year old here for kidney transplant and immunosuppression management.    History of Present Illness    Mr. Cherelle Quesada reports feeling good overall with some medical complaints.  Since last clinic visit, patient reports no hospitalizations or new medical complaints and has been doing well overall.  His energy level is good and remains normal.  He is active and does get some exercise.  Denies any chest pain or shortness of breath with exertion.  He will get a little leg swelling if he is on his feet all day.    Appetite is good, but he has been trying to lose weight and is down ~ 70-80 lbs, although stable weight recently.  No nausea or vomiting.  Occasional loose stools.  No fever, sweats or chills.  No night sweats.    Home BP: 110-130/80s    Problem List   Patient Active Problem List   Diagnosis     Generalized anxiety disorder     Kidney replaced by transplant     Liver replaced by transplant (H)     Immunosuppression (H)     HTN, kidney transplant related     Secondary renal hyperparathyroidism (H)     Aftercare following organ transplant     Status post hip replacement, right     Vitamin D deficiency     CKD (chronic kidney disease) stage 2, GFR 60-89 ml/min     Obesity       Allergies   Allergies   Allergen Reactions     Paxil [Paroxetine] Other (See Comments)     Caused Severe Tremor     Cepacol Maximum Strength Rash     Chlorhexidine Rash     Phenol Rash       Medications    Current Outpatient Medications   Medication Sig     amLODIPine (NORVASC) 5 MG tablet Take 1 tablet (5 mg) by mouth daily     aspirin (ASA) 81 MG chewable tablet Take 81 mg by mouth daily     calcium carbonate 500 mg, elemental, (OSCAL 500) 1250 (500 Ca) MG TABS tablet Take by mouth daily     CHOLECALCIFEROL PO Take 5,000 Units by mouth daily      losartan (COZAAR) 100 MG tablet Take 1 tablet (100 mg) by mouth At Bedtime     multivitamin w/minerals (THERA-VIT-M) tablet Take 1 tablet by mouth daily     mycophenolate (GENERIC EQUIVALENT) 250 MG capsule Take 3 capsules (750 mg) by mouth 2 times daily     sildenafil (VIAGRA) 50 MG tablet Take 0.5-1 tablets (25-50 mg) by mouth daily as needed for erectile dysfunction Take 30 min to 4 hours before intercourse.  Never use with nitroglycerin, terazosin or doxazosin.     sirolimus (GENERIC EQUIVALENT) 0.5 MG tablet Take 1 tablet (0.5 mg) by mouth daily     No current facility-administered medications for this visit.     There are no discontinued medications.    Physical Exam   Vital Signs: BP (!) 154/96   Pulse 74   Temp 97.9  F (36.6  C)   Wt 88.5 kg (195 lb 3.2 oz)   SpO2 99%   BMI 26.47 kg/m      GENERAL APPEARANCE: alert and no distress  HENT: mouth without ulcers or lesions  LYMPHATICS: no cervical or supraclavicular nodes  RESP: lungs clear to auscultation - no rales, rhonchi or wheezes  CV: regular rhythm, normal rate, no rub, no murmur  EDEMA: no LE edema bilaterally  ABDOMEN: soft, nondistended, nontender, bowel sounds normal  MS: extremities normal - no gross deformities noted, no evidence of inflammation in joints, no muscle tenderness  SKIN: no rash  TX KIDNEY: normal  DIALYSIS ACCESS: none      Data     Renal Latest Ref Rng & Units 2/24/2023 1/14/2023 7/16/2022   SODIUM 136 - 145 mmol/L 137 139 139   Na (external) 135 - 145 mmol/L - - -   K 3.4 - 5.3 mmol/L 4.0 3.9 3.8   K (external) 3.5 - 5.0 mmol/L - - -   Cl 98 - 107 mmol/L 102 106 108   Cl (external)  98 - 107 mmol/L 102 106 108   CO2 22 - 29 mmol/L 26 25 26   CO2 (external) 21 - 31 mmol/L - - -   UREA NITROGEN 7 - 30 mg/dL - - 18   UREA NITROGEN (R) 6.0 - 20.0 mg/dL 19.1 15.5 -   BUN (external) 8 - 25 mg/dL - - -   CREATININE 0.67 - 1.17 mg/dL 1.27(H) 1.25(H) 1.26(H)   Cr (external) 0.72 - 1.25 mg/dL - - -   Glucose 70 - 99 mg/dL 94 100(H) 92   Glucose (external) 65 - 100 mg/dL - - -   CALCIUM, TOTAL 8.6 - 10.0 mg/dL 9.8 9.3 9.3   Ca (external) 8.5 - 10.5 mg/dL - - -   MAGNESIUM 1.6 - 2.3 mg/dL - - -   Mg (external) 1.6 - 2.6 mg/dL - - -     Bone Health Latest Ref Rng & Units 7/24/2015 6/1/2015 1/22/2015   PHOSPHORUS 2.5 - 4.5 mg/dL 2.4(L) 3.1 1.9(L)   Phos (external) 2.3 - 4.7 mg/dL - - -   PARATHYROID HORMONE INTACT 12 - 72 pg/mL - - -     Heme Latest Ref Rng & Units 2/24/2023 1/14/2023 7/16/2022   WBC 4.0 - 11.0 10e3/uL 5.7 5.6 5.9   WBC (external) 4.5 - 11.0 thou/cu mm - - -   Hgb 13.3 - 17.7 g/dL 14.8 14.0 13.7   Hgb (external) 13.5 - 17.5 g/dL - - -   Plt 150 - 450 10e3/uL 248 247 244   Plt (external) 140 - 440 thou/cu mm - - -   ABSOLUTE NEUTROPHIL 1.6 - 8.3 10e9/L - - -   ABSOLUTE NEUTROPHILS (EXTERNAL) 1.7 - 7.0 thou/cu mm - - -   ABSOLUTE LYMPHOCYTES 0.8 - 5.3 10e9/L - - -   ABSOLUTE LYMPHOCYTES (EXTERNAL) 0.9 - 2.9 thou/cu mm - - -   ABSOLUTE MONOCYTES 0.0 - 1.3 10e9/L - - -   ABSOLUTE MONOCYTES (EXTERNAL) <0.9 thou/cu mm - - -   ABSOLUTE EOSINOPHILS 0.0 - 0.7 10e9/L - - -   ABSOLUTE EOSINOPHILS (EXTERNAL) <0.5 thou/cu mm - - -   ABSOLUTE BASOPHILS 0.0 - 0.2 10e9/L - - -   ABSOLUTE BASOPHILS (EXTERNAL) <0.3 thou/cu mm - - -   ABS IMMATURE GRANULOCYTES 0 - 0.4 10e9/L - - -     Liver Latest Ref Rng & Units 2/24/2023 1/14/2023 7/16/2022   AP 40 - 129 U/L 47 47 54   AP (external) 50 - 136 U/L - - -   TBili <=1.2 mg/dL 1.3(H) 1.1 1.2   TBili (external) 0.2 - 1.2 mg/dL - - -   BILIRUBIN, DIRECT 0.0 - 0.2 mg/dL - - 0.3(H)   BILIRUBIN DIRECT (R) 0.00 - 0.30 mg/dL 0.27 0.26 -   DBili (external) 0.1 - 0.5 mg/dL  - - -   ALT 10 - 50 U/L 29 26 33   ALT (external) 8 - 45 U/L - - -   AST 10 - 50 U/L 27 23 20   AST (external) 2 - 40 U/L - - -   Tot Protein 6.4 - 8.3 g/dL 7.1 6.5 6.9   Tot Protein (external) 6.0 - 8.0 g/dL - - -   ALBUMIN 3.4 - 5.0 g/dL - - 3.7   ALBUMIN (R) 3.5 - 5.2 g/dL 4.5 4.2 -   Albumin (external) 3.5 - 5.2 g/dL - - -     Pancreas Latest Ref Rng & Units 1/15/2013 1/13/2013 1/6/2013   Amylase 30 - 110 U/L <30(L) 59 116(H)   Lipase 20 - 250 U/L 28 - 263(H)     Iron studies Latest Ref Rng & Units 1/8/2013 10/3/2012   Iron 35 - 180 ug/dL 15(L) 82   IRON SATURATION INDEX 15 - 46 % 12(L) 70(H)   FERRITIN 20 - 300 ng/mL 500(H) -     [facility] Txp Virology Latest Ref Rng & Units 2/3/2014 1/21/2014 1/30/2013   CVM DNA Quant - - - Whole blood, EDTA anticoagulant   CMV Quant <100 Copies/mL - - <100  No CMV DNA detected.   CMV QT Log <2.0 Log copies/mL - - <2.0  The Cytomegalovirus DNA Quantitation assay is a real-time polymerase chain   reaction (PCR) utilizing analyte specific reagents manufactured by [employer]   [employer]. Analyte Specific Reagents (ASRs) are used in many laboratory   tests necessary for standard medical care and generally do not require FDA   approval.   This test was developed and its performance characteristics determined by   [facility].  It has not been   cleared or approved by the US Food and Drug Administration.   BK Spec - - - Plasma, EDTA anticoagulant   BK Res <1000 copies/mL - - <1000   BK Log <3.0 Log copies/mL - - <3.0  The lower limit of detection for this assay is 1000 copies/mL.  Real-time TaqMan   PCR was performed using BK primers and probe for the detection of a 90 bp   portion of the  1 gene.  The performance characteristics were validated by   the [facility].   It has not been cleared or approved by the U.S. Food and Drug Administration.   EBV VCA IGG ANTIBODY U/mL - - -   EBV VCA IGM ANTIBODY U/mL -  - -   Hep B Core NEG - Negative -   Hep B Surf - 1.2 - -   HIV 1&2 NEG - - -            Recent Labs   Lab Test 05/15/17  0741 10/04/18  0820 10/15/18  0748   DOSMPA 8p 5.14.2017 Not Provided Not Provided   MPACID 1.20 0.97* 1.10   MPAG 51.8 70.7 76.6       Again, thank you for allowing me to participate in the care of your patient.      Sincerely,    Doyle Timmons MD

## 2023-02-27 NOTE — LETTER
2/27/2023      RE: Jarod Sagastumeabbeybinu  [address] [place] [address]       TRANSPLANT NEPHROLOGY CHRONIC POST TRANSPLANT VISIT    Assessment & Plan   # DDKT (K): Stable   - Baseline Creatinine:  ~ 1.2-1.4   - Proteinuria: Normal (<0.2 grams)   - Date DSA Last Checked: Oct/2017      Latest DSA: Not checked recently due to time from transplant   - BK Viremia: Not checked recently due to time from transplant   - Kidney Tx Biopsy: No     # Liver Tx (K): Stable, normal liver function tests.  Follows with Hepatology.    # Immunosuppression: Mycophenolate mofetil (dose 750 mg every 12 hours) and Sirolimus (goal 4-6)   - Continue with intensive monitoring of immunosuppression for efficacy and toxicity.   - Changes: Not at this time    # Infection Prophylaxis:   Last CD4 Level: Not checked  - PJP: None    # Hypertension: Controlled;  Goal BP: < 130/80   - Changes: Not at this time    # Mineral Bone Disorder:   - Vitamin D; level: Not checked recently        On supplement: Yes  - Calcium; level: Normal        On supplement: Yes    # Skin Cancer Risk:    - Discussed sun protection and recommend regular follow up with Dermatology.    # Medical Compliance: Yes    # COVID-19 Virus Review: Discussed COVID-19 virus and the potential medical risks.  Reviewed preventative health recommendations, including wearing a mask where appropriate.  Recommended COVID vaccination should be up to date with either an initial vaccination or booster shot when appropriate.  Asked the patient to inform the transplant center if they are exposed or diagnosed with this virus.    # COVID Vaccination Up To Date: Yes    # Transplant History:  Etiology of Kidney Failure: Hepatorenal syndrome (HRS)  Tx: DDKT (K) and Liver Tx ([facility])  Transplant: 1/14/2013 (Kidney), 1/14/2013 (Liver)  Significant changes in immunosuppression: Changed from tacrolimus to sirolimus due to significant neuropathy symptoms.  Significant transplant-related  complications: None    Transplant Office Phone Number: [phone]    Assessment and plan was discussed with the patient and he voiced his understanding and agreement.    Return visit: Return in about 1 year (around 2/27/2024).    Doyle Timmons MD    Chief Complaint   Mr. Cherelle Quesada is a 52 year old here for kidney transplant and immunosuppression management.    History of Present Illness    Mr. Cherelle Quesada reports feeling good overall with some medical complaints.  Since last clinic visit, patient reports no hospitalizations or new medical complaints and has been doing well overall.  His energy level is good and remains normal.  He is active and does get some exercise.  Denies any chest pain or shortness of breath with exertion.  He will get a little leg swelling if he is on his feet all day.    Appetite is good, but he has been trying to lose weight and is down ~ 70-80 lbs, although stable weight recently.  No nausea or vomiting.  Occasional loose stools.  No fever, sweats or chills.  No night sweats.    Home BP: 110-130/80s    Problem List   Patient Active Problem List   Diagnosis     Generalized anxiety disorder     Kidney replaced by transplant     Liver replaced by transplant (H)     Immunosuppression (H)     HTN, kidney transplant related     Secondary renal hyperparathyroidism (H)     Aftercare following organ transplant     Status post hip replacement, right     Vitamin D deficiency     CKD (chronic kidney disease) stage 2, GFR 60-89 ml/min     Obesity       Allergies   Allergies   Allergen Reactions     Paxil [Paroxetine] Other (See Comments)     Caused Severe Tremor     Cepacol Maximum Strength Rash     Chlorhexidine Rash     Phenol Rash       Medications   Current Outpatient Medications   Medication Sig     amLODIPine (NORVASC) 5 MG tablet Take 1 tablet (5 mg) by mouth daily     aspirin (ASA) 81 MG chewable tablet Take 81 mg by mouth daily     calcium carbonate 500 mg, elemental,  (OSCAL 500) 1250 (500 Ca) MG TABS tablet Take by mouth daily     CHOLECALCIFEROL PO Take 5,000 Units by mouth daily      losartan (COZAAR) 100 MG tablet Take 1 tablet (100 mg) by mouth At Bedtime     multivitamin w/minerals (THERA-VIT-M) tablet Take 1 tablet by mouth daily     mycophenolate (GENERIC EQUIVALENT) 250 MG capsule Take 3 capsules (750 mg) by mouth 2 times daily     sildenafil (VIAGRA) 50 MG tablet Take 0.5-1 tablets (25-50 mg) by mouth daily as needed for erectile dysfunction Take 30 min to 4 hours before intercourse.  Never use with nitroglycerin, terazosin or doxazosin.     sirolimus (GENERIC EQUIVALENT) 0.5 MG tablet Take 1 tablet (0.5 mg) by mouth daily     No current facility-administered medications for this visit.     There are no discontinued medications.    Physical Exam   Vital Signs: BP (!) 154/96   Pulse 74   Temp 97.9  F (36.6  C)   Wt 88.5 kg (195 lb 3.2 oz)   SpO2 99%   BMI 26.47 kg/m      GENERAL APPEARANCE: alert and no distress  HENT: mouth without ulcers or lesions  LYMPHATICS: no cervical or supraclavicular nodes  RESP: lungs clear to auscultation - no rales, rhonchi or wheezes  CV: regular rhythm, normal rate, no rub, no murmur  EDEMA: no LE edema bilaterally  ABDOMEN: soft, nondistended, nontender, bowel sounds normal  MS: extremities normal - no gross deformities noted, no evidence of inflammation in joints, no muscle tenderness  SKIN: no rash  TX KIDNEY: normal  DIALYSIS ACCESS: none      Data     Renal Latest Ref Rng & Units 2/24/2023 1/14/2023 7/16/2022   SODIUM 136 - 145 mmol/L 137 139 139   Na (external) 135 - 145 mmol/L - - -   K 3.4 - 5.3 mmol/L 4.0 3.9 3.8   K (external) 3.5 - 5.0 mmol/L - - -   Cl 98 - 107 mmol/L 102 106 108   Cl (external) 98 - 107 mmol/L 102 106 108   CO2 22 - 29 mmol/L 26 25 26   CO2 (external) 21 - 31 mmol/L - - -   UREA NITROGEN 7 - 30 mg/dL - - 18   UREA NITROGEN (R) 6.0 - 20.0 mg/dL 19.1 15.5 -   BUN (external) 8 - 25 mg/dL - - -   CREATININE  0.67 - 1.17 mg/dL 1.27(H) 1.25(H) 1.26(H)   Cr (external) 0.72 - 1.25 mg/dL - - -   Glucose 70 - 99 mg/dL 94 100(H) 92   Glucose (external) 65 - 100 mg/dL - - -   CALCIUM, TOTAL 8.6 - 10.0 mg/dL 9.8 9.3 9.3   Ca (external) 8.5 - 10.5 mg/dL - - -   MAGNESIUM 1.6 - 2.3 mg/dL - - -   Mg (external) 1.6 - 2.6 mg/dL - - -     Bone Health Latest Ref Rng & Units 7/24/2015 6/1/2015 1/22/2015   PHOSPHORUS 2.5 - 4.5 mg/dL 2.4(L) 3.1 1.9(L)   Phos (external) 2.3 - 4.7 mg/dL - - -   PARATHYROID HORMONE INTACT 12 - 72 pg/mL - - -     Heme Latest Ref Rng & Units 2/24/2023 1/14/2023 7/16/2022   WBC 4.0 - 11.0 10e3/uL 5.7 5.6 5.9   WBC (external) 4.5 - 11.0 thou/cu mm - - -   Hgb 13.3 - 17.7 g/dL 14.8 14.0 13.7   Hgb (external) 13.5 - 17.5 g/dL - - -   Plt 150 - 450 10e3/uL 248 247 244   Plt (external) 140 - 440 thou/cu mm - - -   ABSOLUTE NEUTROPHIL 1.6 - 8.3 10e9/L - - -   ABSOLUTE NEUTROPHILS (EXTERNAL) 1.7 - 7.0 thou/cu mm - - -   ABSOLUTE LYMPHOCYTES 0.8 - 5.3 10e9/L - - -   ABSOLUTE LYMPHOCYTES (EXTERNAL) 0.9 - 2.9 thou/cu mm - - -   ABSOLUTE MONOCYTES 0.0 - 1.3 10e9/L - - -   ABSOLUTE MONOCYTES (EXTERNAL) <0.9 thou/cu mm - - -   ABSOLUTE EOSINOPHILS 0.0 - 0.7 10e9/L - - -   ABSOLUTE EOSINOPHILS (EXTERNAL) <0.5 thou/cu mm - - -   ABSOLUTE BASOPHILS 0.0 - 0.2 10e9/L - - -   ABSOLUTE BASOPHILS (EXTERNAL) <0.3 thou/cu mm - - -   ABS IMMATURE GRANULOCYTES 0 - 0.4 10e9/L - - -     Liver Latest Ref Rng & Units 2/24/2023 1/14/2023 7/16/2022   AP 40 - 129 U/L 47 47 54   AP (external) 50 - 136 U/L - - -   TBili <=1.2 mg/dL 1.3(H) 1.1 1.2   TBili (external) 0.2 - 1.2 mg/dL - - -   BILIRUBIN, DIRECT 0.0 - 0.2 mg/dL - - 0.3(H)   BILIRUBIN DIRECT (R) 0.00 - 0.30 mg/dL 0.27 0.26 -   DBili (external) 0.1 - 0.5 mg/dL - - -   ALT 10 - 50 U/L 29 26 33   ALT (external) 8 - 45 U/L - - -   AST 10 - 50 U/L 27 23 20   AST (external) 2 - 40 U/L - - -   Tot Protein 6.4 - 8.3 g/dL 7.1 6.5 6.9   Tot Protein (external) 6.0 - 8.0 g/dL - - -   ALBUMIN 3.4  - 5.0 g/dL - - 3.7   ALBUMIN (R) 3.5 - 5.2 g/dL 4.5 4.2 -   Albumin (external) 3.5 - 5.2 g/dL - - -     Pancreas Latest Ref Rng & Units 1/15/2013 1/13/2013 1/6/2013   Amylase 30 - 110 U/L <30(L) 59 116(H)   Lipase 20 - 250 U/L 28 - 263(H)     Iron studies Latest Ref Rng & Units 1/8/2013 10/3/2012   Iron 35 - 180 ug/dL 15(L) 82   IRON SATURATION INDEX 15 - 46 % 12(L) 70(H)   FERRITIN 20 - 300 ng/mL 500(H) -     [facility] Txp Virology Latest Ref Rng & Units 2/3/2014 1/21/2014 1/30/2013   CVM DNA Quant - - - Whole blood, EDTA anticoagulant   CMV Quant <100 Copies/mL - - <100  No CMV DNA detected.   CMV QT Log <2.0 Log copies/mL - - <2.0  The Cytomegalovirus DNA Quantitation assay is a real-time polymerase chain   reaction (PCR) utilizing analyte specific reagents manufactured by [employer]   [facility]. Analyte Specific Reagents (ASRs) are used in many laboratory   tests necessary for standard medical care and generally do not require FDA   approval.   This test was developed and its performance characteristics determined by   [facility].  It has not been   cleared or approved by the US Food and Drug Administration.   BK Spec - - - Plasma, EDTA anticoagulant   BK Res <1000 copies/mL - - <1000   BK Log <3.0 Log copies/mL - - <3.0  The lower limit of detection for this assay is 1000 copies/mL.  Real-time TaqMan   PCR was performed using BK primers and probe for the detection of a 90 bp   portion of the  1 gene.  The performance characteristics were validated by   the [facility].   It has not been cleared or approved by the U.S. Food and Drug Administration.   EBV VCA IGG ANTIBODY U/mL - - -   EBV VCA IGM ANTIBODY U/mL - - -   Hep B Core NEG - Negative -   Hep B Surf - 1.2 - -   HIV 1&2 NEG - - -            Recent Labs   Lab Test 05/15/17  0741 10/04/18  0820 10/15/18  0748   DOSA 8p 5.14.2017 Not Provided Not Provided   MPACID 1.20 0.97* 1.10    MPAG 51.8 70.7 76.6       Doyle Timmons MD

## 2023-03-05 PROBLEM — N18.2 CKD (CHRONIC KIDNEY DISEASE) STAGE 2, GFR 60-89 ML/MIN: Status: ACTIVE | Noted: 2021-01-05

## 2023-03-05 PROBLEM — E66.9 OBESITY: Status: ACTIVE | Noted: 2023-03-05

## 2023-03-05 PROBLEM — D75.1 POST-TRANSPLANT ERYTHROCYTOSIS: Status: RESOLVED | Noted: 2020-01-07 | Resolved: 2023-03-05

## 2023-03-05 NOTE — PROGRESS NOTES
TRANSPLANT NEPHROLOGY CHRONIC POST TRANSPLANT VISIT    Assessment & Plan   # DDKT (K): Stable   - Baseline Creatinine:  ~ 1.2-1.4   - Proteinuria: Normal (<0.2 grams)   - Date DSA Last Checked: Oct/2017      Latest DSA: Not checked recently due to time from transplant   - BK Viremia: Not checked recently due to time from transplant   - Kidney Tx Biopsy: No     # Liver Tx (K): Stable, normal liver function tests.  Follows with Hepatology.    # Immunosuppression: Mycophenolate mofetil (dose 750 mg every 12 hours) and Sirolimus (goal 4-6)   - Continue with intensive monitoring of immunosuppression for efficacy and toxicity.   - Changes: Not at this time    # Infection Prophylaxis:   Last CD4 Level: Not checked  - PJP: None    # Hypertension: Controlled;  Goal BP: < 130/80   - Changes: Not at this time    # Mineral Bone Disorder:   - Vitamin D; level: Not checked recently        On supplement: Yes  - Calcium; level: Normal        On supplement: Yes    # Skin Cancer Risk:    - Discussed sun protection and recommend regular follow up with Dermatology.    # Medical Compliance: Yes    # COVID-19 Virus Review: Discussed COVID-19 virus and the potential medical risks.  Reviewed preventative health recommendations, including wearing a mask where appropriate.  Recommended COVID vaccination should be up to date with either an initial vaccination or booster shot when appropriate.  Asked the patient to inform the transplant center if they are exposed or diagnosed with this virus.    # COVID Vaccination Up To Date: Yes    # Transplant History:  Etiology of Kidney Failure: Hepatorenal syndrome (HRS)  Tx: DDKT (K) and Liver Tx (hospitals)  Transplant: 1/14/2013 (Kidney), 1/14/2013 (Liver)  Significant changes in immunosuppression: Changed from tacrolimus to sirolimus due to significant neuropathy symptoms.  Significant transplant-related complications: None    Transplant Office Phone Number: [phone]    Assessment and plan was  discussed with the patient and he voiced his understanding and agreement.    Return visit: Return in about 1 year (around 2/27/2024).    Doyle Timmons MD    Chief Complaint   Mr. Cherelle Quesada is a 52 year old here for kidney transplant and immunosuppression management.    History of Present Illness    Mr. Cherelle Quesada reports feeling good overall with some medical complaints.  Since last clinic visit, patient reports no hospitalizations or new medical complaints and has been doing well overall.  His energy level is good and remains normal.  He is active and does get some exercise.  Denies any chest pain or shortness of breath with exertion.  He will get a little leg swelling if he is on his feet all day.    Appetite is good, but he has been trying to lose weight and is down ~ 70-80 lbs, although stable weight recently.  No nausea or vomiting.  Occasional loose stools.  No fever, sweats or chills.  No night sweats.    Home BP: 110-130/80s    Problem List   Patient Active Problem List   Diagnosis     Generalized anxiety disorder     Kidney replaced by transplant     Liver replaced by transplant (H)     Immunosuppression (H)     HTN, kidney transplant related     Secondary renal hyperparathyroidism (H)     Aftercare following organ transplant     Status post hip replacement, right     Vitamin D deficiency     CKD (chronic kidney disease) stage 2, GFR 60-89 ml/min     Obesity       Allergies   Allergies   Allergen Reactions     Paxil [Paroxetine] Other (See Comments)     Caused Severe Tremor     Cepacol Maximum Strength Rash     Chlorhexidine Rash     Phenol Rash       Medications   Current Outpatient Medications   Medication Sig     amLODIPine (NORVASC) 5 MG tablet Take 1 tablet (5 mg) by mouth daily     aspirin (ASA) 81 MG chewable tablet Take 81 mg by mouth daily     calcium carbonate 500 mg, elemental, (OSCAL 500) 1250 (500 Ca) MG TABS tablet Take by mouth daily     CHOLECALCIFEROL PO Take 5,000  Units by mouth daily      losartan (COZAAR) 100 MG tablet Take 1 tablet (100 mg) by mouth At Bedtime     multivitamin w/minerals (THERA-VIT-M) tablet Take 1 tablet by mouth daily     mycophenolate (GENERIC EQUIVALENT) 250 MG capsule Take 3 capsules (750 mg) by mouth 2 times daily     sildenafil (VIAGRA) 50 MG tablet Take 0.5-1 tablets (25-50 mg) by mouth daily as needed for erectile dysfunction Take 30 min to 4 hours before intercourse.  Never use with nitroglycerin, terazosin or doxazosin.     sirolimus (GENERIC EQUIVALENT) 0.5 MG tablet Take 1 tablet (0.5 mg) by mouth daily     No current facility-administered medications for this visit.     There are no discontinued medications.    Physical Exam   Vital Signs: BP (!) 154/96   Pulse 74   Temp 97.9  F (36.6  C)   Wt 88.5 kg (195 lb 3.2 oz)   SpO2 99%   BMI 26.47 kg/m      GENERAL APPEARANCE: alert and no distress  HENT: mouth without ulcers or lesions  LYMPHATICS: no cervical or supraclavicular nodes  RESP: lungs clear to auscultation - no rales, rhonchi or wheezes  CV: regular rhythm, normal rate, no rub, no murmur  EDEMA: no LE edema bilaterally  ABDOMEN: soft, nondistended, nontender, bowel sounds normal  MS: extremities normal - no gross deformities noted, no evidence of inflammation in joints, no muscle tenderness  SKIN: no rash  TX KIDNEY: normal  DIALYSIS ACCESS: none      Data     Renal Latest Ref Rng & Units 2/24/2023 1/14/2023 7/16/2022   SODIUM 136 - 145 mmol/L 137 139 139   Na (external) 135 - 145 mmol/L - - -   K 3.4 - 5.3 mmol/L 4.0 3.9 3.8   K (external) 3.5 - 5.0 mmol/L - - -   Cl 98 - 107 mmol/L 102 106 108   Cl (external) 98 - 107 mmol/L 102 106 108   CO2 22 - 29 mmol/L 26 25 26   CO2 (external) 21 - 31 mmol/L - - -   UREA NITROGEN 7 - 30 mg/dL - - 18   UREA NITROGEN (R) 6.0 - 20.0 mg/dL 19.1 15.5 -   BUN (external) 8 - 25 mg/dL - - -   CREATININE 0.67 - 1.17 mg/dL 1.27(H) 1.25(H) 1.26(H)   Cr (external) 0.72 - 1.25 mg/dL - - -   Glucose 70 -  99 mg/dL 94 100(H) 92   Glucose (external) 65 - 100 mg/dL - - -   CALCIUM, TOTAL 8.6 - 10.0 mg/dL 9.8 9.3 9.3   Ca (external) 8.5 - 10.5 mg/dL - - -   MAGNESIUM 1.6 - 2.3 mg/dL - - -   Mg (external) 1.6 - 2.6 mg/dL - - -     Bone Health Latest Ref Rng & Units 7/24/2015 6/1/2015 1/22/2015   PHOSPHORUS 2.5 - 4.5 mg/dL 2.4(L) 3.1 1.9(L)   Phos (external) 2.3 - 4.7 mg/dL - - -   PARATHYROID HORMONE INTACT 12 - 72 pg/mL - - -     Heme Latest Ref Rng & Units 2/24/2023 1/14/2023 7/16/2022   WBC 4.0 - 11.0 10e3/uL 5.7 5.6 5.9   WBC (external) 4.5 - 11.0 thou/cu mm - - -   Hgb 13.3 - 17.7 g/dL 14.8 14.0 13.7   Hgb (external) 13.5 - 17.5 g/dL - - -   Plt 150 - 450 10e3/uL 248 247 244   Plt (external) 140 - 440 thou/cu mm - - -   ABSOLUTE NEUTROPHIL 1.6 - 8.3 10e9/L - - -   ABSOLUTE NEUTROPHILS (EXTERNAL) 1.7 - 7.0 thou/cu mm - - -   ABSOLUTE LYMPHOCYTES 0.8 - 5.3 10e9/L - - -   ABSOLUTE LYMPHOCYTES (EXTERNAL) 0.9 - 2.9 thou/cu mm - - -   ABSOLUTE MONOCYTES 0.0 - 1.3 10e9/L - - -   ABSOLUTE MONOCYTES (EXTERNAL) <0.9 thou/cu mm - - -   ABSOLUTE EOSINOPHILS 0.0 - 0.7 10e9/L - - -   ABSOLUTE EOSINOPHILS (EXTERNAL) <0.5 thou/cu mm - - -   ABSOLUTE BASOPHILS 0.0 - 0.2 10e9/L - - -   ABSOLUTE BASOPHILS (EXTERNAL) <0.3 thou/cu mm - - -   ABS IMMATURE GRANULOCYTES 0 - 0.4 10e9/L - - -     Liver Latest Ref Rng & Units 2/24/2023 1/14/2023 7/16/2022   AP 40 - 129 U/L 47 47 54   AP (external) 50 - 136 U/L - - -   TBili <=1.2 mg/dL 1.3(H) 1.1 1.2   TBili (external) 0.2 - 1.2 mg/dL - - -   BILIRUBIN, DIRECT 0.0 - 0.2 mg/dL - - 0.3(H)   BILIRUBIN DIRECT (R) 0.00 - 0.30 mg/dL 0.27 0.26 -   DBili (external) 0.1 - 0.5 mg/dL - - -   ALT 10 - 50 U/L 29 26 33   ALT (external) 8 - 45 U/L - - -   AST 10 - 50 U/L 27 23 20   AST (external) 2 - 40 U/L - - -   Tot Protein 6.4 - 8.3 g/dL 7.1 6.5 6.9   Tot Protein (external) 6.0 - 8.0 g/dL - - -   ALBUMIN 3.4 - 5.0 g/dL - - 3.7   ALBUMIN (R) 3.5 - 5.2 g/dL 4.5 4.2 -   Albumin (external) 3.5 - 5.2 g/dL - -  -     Pancreas Latest Ref Rng & Units 1/15/2013 1/13/2013 1/6/2013   Amylase 30 - 110 U/L <30(L) 59 116(H)   Lipase 20 - 250 U/L 28 - 263(H)     Iron studies Latest Ref Rng & Units 1/8/2013 10/3/2012   Iron 35 - 180 ug/dL 15(L) 82   IRON SATURATION INDEX 15 - 46 % 12(L) 70(H)   FERRITIN 20 - 300 ng/mL 500(H) -     [facility] Txp Virology Latest Ref Rng & Units 2/3/2014 1/21/2014 1/30/2013   CVM DNA Quant - - - Whole blood, EDTA anticoagulant   CMV Quant <100 Copies/mL - - <100  No CMV DNA detected.   CMV QT Log <2.0 Log copies/mL - - <2.0  The Cytomegalovirus DNA Quantitation assay is a real-time polymerase chain   reaction (PCR) utilizing analyte specific reagents manufactured by [employer]   [facility]. Analyte Specific Reagents (ASRs) are used in many laboratory   tests necessary for standard medical care and generally do not require FDA   approval.   This test was developed and its performance characteristics determined by   [facility].  It has not been   cleared or approved by the US Food and Drug Administration.   BK Spec - - - Plasma, EDTA anticoagulant   BK Res <1000 copies/mL - - <1000   BK Log <3.0 Log copies/mL - - <3.0  The lower limit of detection for this assay is 1000 copies/mL.  Real-time TaqMan   PCR was performed using BK primers and probe for the detection of a 90 bp   portion of the  1 gene.  The performance characteristics were validated by   the [facility].   It has not been cleared or approved by the U.S. Food and Drug Administration.   EBV VCA IGG ANTIBODY U/mL - - -   EBV VCA IGM ANTIBODY U/mL - - -   Hep B Core NEG - Negative -   Hep B Surf - 1.2 - -   HIV 1&2 NEG - - -            Recent Labs   Lab Test 05/15/17  0741 10/04/18  0820 10/15/18  0748   DOSMPA 8p 5.14.2017 Not Provided Not Provided   MPACID 1.20 0.97* 1.10   MPAG 51.8 70.7 76.6

## 2023-03-07 ENCOUNTER — TELEPHONE (OUTPATIENT)
Dept: TRANSPLANT | Facility: CLINIC | Age: 53
End: 2023-03-07
Payer: COMMERCIAL

## 2023-03-07 DIAGNOSIS — Z48.298 AFTERCARE FOLLOWING ORGAN TRANSPLANT: ICD-10-CM

## 2023-03-07 DIAGNOSIS — D84.9 IMMUNOSUPPRESSED STATUS (H): ICD-10-CM

## 2023-03-07 DIAGNOSIS — Z94.0 KIDNEY TRANSPLANTED: Primary | ICD-10-CM

## 2023-03-07 DIAGNOSIS — E55.9 VITAMIN D DEFICIENCY: ICD-10-CM

## 2023-03-07 NOTE — TELEPHONE ENCOUNTER
Doyle Timmons MD Ututalum, Teresa, RN  Please check the following labs: Vitamin D and CD4 level.       OUTCOME:  Orders placed.  Called and left VM.

## 2023-04-17 NOTE — TELEPHONE ENCOUNTER
Pt had UA and protein random urine done in October, no urine labs needed for transplant. Confirmed with ALYSIA Dai in clinic they don't need anything checked either. Lab instructed to dispose of sample.   Pt presents to ED with rash across entire body x5 days.  Pt did not take anything for rash. Pt started a new medications for sleep x1 week but is unsure what its called. Pt denies using new soap or laundry detergent.

## 2023-04-20 DIAGNOSIS — Z94.4 LIVER TRANSPLANTED (H): ICD-10-CM

## 2023-04-20 DIAGNOSIS — Z94.0 KIDNEY REPLACED BY TRANSPLANT: ICD-10-CM

## 2023-04-20 RX ORDER — SIROLIMUS 0.5 MG/1
0.5 TABLET, FILM COATED ORAL DAILY
Qty: 90 TABLET | Refills: 3 | Status: SHIPPED | OUTPATIENT
Start: 2023-04-20 | End: 2023-11-20 | Stop reason: DRUGHIGH

## 2023-04-20 RX ORDER — MYCOPHENOLATE MOFETIL 250 MG/1
750 CAPSULE ORAL 2 TIMES DAILY
Qty: 540 CAPSULE | Refills: 3 | Status: SHIPPED | OUTPATIENT
Start: 2023-04-20 | End: 2024-04-05

## 2023-04-23 ENCOUNTER — HEALTH MAINTENANCE LETTER (OUTPATIENT)
Age: 53
End: 2023-04-23

## 2023-10-09 DIAGNOSIS — Z94.4 LIVER REPLACED BY TRANSPLANT (H): Primary | ICD-10-CM

## 2023-10-09 NOTE — PROGRESS NOTES
Preformed Annual Chart Review    Appointment order in place with  to set follow-up appointment.    Labs ordered and patient encouraged to remain compliant with post-transplant medication and laboratory surveillance.    Checklist Updated

## 2023-10-14 ENCOUNTER — LAB (OUTPATIENT)
Dept: LAB | Facility: CLINIC | Age: 53
End: 2023-10-14
Attending: INTERNAL MEDICINE
Payer: COMMERCIAL

## 2023-10-14 DIAGNOSIS — D84.9 IMMUNOSUPPRESSED STATUS (H): ICD-10-CM

## 2023-10-14 DIAGNOSIS — Z48.298 AFTERCARE FOLLOWING ORGAN TRANSPLANT: ICD-10-CM

## 2023-10-14 DIAGNOSIS — E55.9 VITAMIN D DEFICIENCY: ICD-10-CM

## 2023-10-14 DIAGNOSIS — Z94.0 KIDNEY TRANSPLANTED: ICD-10-CM

## 2023-10-14 DIAGNOSIS — Z94.4 LIVER REPLACED BY TRANSPLANT (H): ICD-10-CM

## 2023-10-14 LAB
ALBUMIN MFR UR ELPH: 6.3 MG/DL
ALBUMIN SERPL BCG-MCNC: 4.4 G/DL (ref 3.5–5.2)
ALBUMIN UR-MCNC: NEGATIVE MG/DL
ALP SERPL-CCNC: 48 U/L (ref 40–129)
ALT SERPL W P-5'-P-CCNC: 22 U/L (ref 0–70)
ANION GAP SERPL CALCULATED.3IONS-SCNC: 9 MMOL/L (ref 7–15)
APPEARANCE UR: CLEAR
AST SERPL W P-5'-P-CCNC: 23 U/L (ref 0–45)
BILIRUB DIRECT SERPL-MCNC: 0.34 MG/DL (ref 0–0.3)
BILIRUB SERPL-MCNC: 1.4 MG/DL
BILIRUB UR QL STRIP: NEGATIVE
BUN SERPL-MCNC: 16.1 MG/DL (ref 6–20)
CALCIUM SERPL-MCNC: 9.4 MG/DL (ref 8.6–10)
CD3 CELLS # BLD: 717 CELLS/UL (ref 603–2990)
CD3 CELLS NFR BLD: 55 % (ref 49–84)
CD3+CD4+ CELLS # BLD: 514 CELLS/UL (ref 441–2156)
CD3+CD4+ CELLS NFR BLD: 40 % (ref 28–63)
CD3+CD4+ CELLS/CD3+CD8+ CLL BLD: 2.7 % (ref 1.4–2.6)
CD3+CD8+ CELLS # BLD: 191 CELLS/UL (ref 125–1312)
CD3+CD8+ CELLS NFR BLD: 15 % (ref 10–40)
CHLORIDE SERPL-SCNC: 104 MMOL/L (ref 98–107)
CHOLEST SERPL-MCNC: 162 MG/DL
COLOR UR AUTO: NORMAL
CREAT SERPL-MCNC: 1.27 MG/DL (ref 0.67–1.17)
CREAT UR-MCNC: 62.1 MG/DL
DEPRECATED HCO3 PLAS-SCNC: 24 MMOL/L (ref 22–29)
EGFRCR SERPLBLD CKD-EPI 2021: 68 ML/MIN/1.73M2
ERYTHROCYTE [DISTWIDTH] IN BLOOD BY AUTOMATED COUNT: 13.2 % (ref 10–15)
GLUCOSE SERPL-MCNC: 97 MG/DL (ref 70–99)
GLUCOSE UR STRIP-MCNC: NEGATIVE MG/DL
HCT VFR BLD AUTO: 43.6 % (ref 40–53)
HDLC SERPL-MCNC: 56 MG/DL
HGB BLD-MCNC: 14.6 G/DL (ref 13.3–17.7)
HGB UR QL STRIP: NEGATIVE
KETONES UR STRIP-MCNC: NEGATIVE MG/DL
LDLC SERPL CALC-MCNC: 95 MG/DL
LEUKOCYTE ESTERASE UR QL STRIP: NEGATIVE
MCH RBC QN AUTO: 27.6 PG (ref 26.5–33)
MCHC RBC AUTO-ENTMCNC: 33.5 G/DL (ref 31.5–36.5)
MCV RBC AUTO: 82 FL (ref 78–100)
NITRATE UR QL: NEGATIVE
NONHDLC SERPL-MCNC: 106 MG/DL
PH UR STRIP: 5.5 [PH] (ref 5–7)
PLATELET # BLD AUTO: 251 10E3/UL (ref 150–450)
POTASSIUM SERPL-SCNC: 4.3 MMOL/L (ref 3.4–5.3)
PROT SERPL-MCNC: 6.9 G/DL (ref 6.4–8.3)
PROT/CREAT 24H UR: 0.1 MG/MG CR (ref 0–0.2)
RBC # BLD AUTO: 5.29 10E6/UL (ref 4.4–5.9)
SIROLIMUS BLD-MCNC: 2.9 UG/L (ref 5–15)
SODIUM SERPL-SCNC: 137 MMOL/L (ref 135–145)
SP GR UR STRIP: 1.01 (ref 1–1.03)
T CELL COMMENT: ABNORMAL
TME LAST DOSE: ABNORMAL H
TME LAST DOSE: ABNORMAL H
TRIGL SERPL-MCNC: 54 MG/DL
UROBILINOGEN UR STRIP-MCNC: NORMAL MG/DL
VIT D+METAB SERPL-MCNC: 66 NG/ML (ref 20–50)
WBC # BLD AUTO: 4.6 10E3/UL (ref 4–11)

## 2023-10-14 PROCEDURE — 99000 SPECIMEN HANDLING OFFICE-LAB: CPT | Performed by: PATHOLOGY

## 2023-10-14 PROCEDURE — 80061 LIPID PANEL: CPT | Performed by: PATHOLOGY

## 2023-10-14 PROCEDURE — 80053 COMPREHEN METABOLIC PANEL: CPT | Performed by: PATHOLOGY

## 2023-10-14 PROCEDURE — 36415 COLL VENOUS BLD VENIPUNCTURE: CPT | Performed by: PATHOLOGY

## 2023-10-14 PROCEDURE — 84156 ASSAY OF PROTEIN URINE: CPT | Performed by: PATHOLOGY

## 2023-10-14 PROCEDURE — 82306 VITAMIN D 25 HYDROXY: CPT | Performed by: INTERNAL MEDICINE

## 2023-10-14 PROCEDURE — 81003 URINALYSIS AUTO W/O SCOPE: CPT | Performed by: PATHOLOGY

## 2023-10-14 PROCEDURE — 85027 COMPLETE CBC AUTOMATED: CPT | Performed by: PATHOLOGY

## 2023-10-14 PROCEDURE — 82248 BILIRUBIN DIRECT: CPT | Performed by: PATHOLOGY

## 2023-10-14 PROCEDURE — 86360 T CELL ABSOLUTE COUNT/RATIO: CPT | Performed by: INTERNAL MEDICINE

## 2023-10-14 PROCEDURE — 80195 ASSAY OF SIROLIMUS: CPT | Performed by: INTERNAL MEDICINE

## 2023-10-16 ENCOUNTER — TELEPHONE (OUTPATIENT)
Dept: TRANSPLANT | Facility: CLINIC | Age: 53
End: 2023-10-16
Payer: COMMERCIAL

## 2023-10-16 DIAGNOSIS — Z94.4 LIVER REPLACED BY TRANSPLANT (H): Primary | ICD-10-CM

## 2023-10-16 NOTE — TELEPHONE ENCOUNTER
Vitamin D = 66 (10/14/23)  Absolute CD4 count =  514 (10/14/23)      Jarod Sal Teresa, RN  Phone Number: [phone]     Yes, I am still taking a Vit D supplement.           Jarod Quesada  You12 minutes ago (12:35 PM)   Dana,  That is correct I no longer take bactrim and have not probably for 9 plus years.     Thanks  You  Jarod Quesada20 minutes ago (12:28 PM)   AMANDA Olivera,     You no longer take bactrim correct?  Please confirm     Thanks,  Bayron        OUTCOME:  Doyle Timmons MD Ututalum, Teresa, RN  I would have him hold cholecalciferol for a month, then restart at 50 mcg (2000 units) daily.      ----- Message -----  From: Dana Murry RN  Sent: 10/16/2023  12:50 PM CDT  To: Doyle Timmons MD    Absolute cd4 >200, not on bactrim  Vit elevated, stop supplement?      MyChart message sent.

## 2023-10-16 NOTE — TELEPHONE ENCOUNTER
ISSUE:   Sirolimus level 2.9 on 10/14, goal 4-6, dose 0.5 mg daily.    PLAN:   Please call patient and confirm this was an accurate 24-hour trough. Verify Sirolimus dose 0.5 mg daily. Confirm no new medications or illness. Confirm no missed doses. If accurate trough and accurate dose, increase Sirolimus dose to 1 mg daily and repeat labs in 1 month.    Argelia Randolph RN      OUTCOME:   Spoke with patient, they confirm accurate trough level and current dose 0.5 mg daily. Patient confirmed dose change to 1 mg daily and to repeat labs in 1 months. Orders sent to preferred pharmacy for dose change and lab for repeat labs. Patient voiced understanding of plan.     Alondra Hutchinson LPN

## 2023-11-20 ENCOUNTER — LAB (OUTPATIENT)
Dept: LAB | Facility: CLINIC | Age: 53
End: 2023-11-20
Payer: COMMERCIAL

## 2023-11-20 DIAGNOSIS — Z94.0 KIDNEY TRANSPLANTED: ICD-10-CM

## 2023-11-20 DIAGNOSIS — Z94.4 LIVER REPLACED BY TRANSPLANT (H): ICD-10-CM

## 2023-11-20 DIAGNOSIS — Z94.4 LIVER REPLACED BY TRANSPLANT (H): Primary | ICD-10-CM

## 2023-11-20 LAB
ALBUMIN SERPL BCG-MCNC: 4.3 G/DL (ref 3.5–5.2)
ALP SERPL-CCNC: 50 U/L (ref 40–150)
ALT SERPL W P-5'-P-CCNC: 22 U/L (ref 0–70)
ANION GAP SERPL CALCULATED.3IONS-SCNC: 10 MMOL/L (ref 7–15)
AST SERPL W P-5'-P-CCNC: 26 U/L (ref 0–45)
BILIRUB DIRECT SERPL-MCNC: 0.38 MG/DL (ref 0–0.3)
BILIRUB SERPL-MCNC: 1.4 MG/DL
BUN SERPL-MCNC: 19.2 MG/DL (ref 6–20)
CALCIUM SERPL-MCNC: 9.4 MG/DL (ref 8.6–10)
CHLORIDE SERPL-SCNC: 103 MMOL/L (ref 98–107)
CREAT SERPL-MCNC: 1.2 MG/DL (ref 0.67–1.17)
DEPRECATED HCO3 PLAS-SCNC: 23 MMOL/L (ref 22–29)
EGFRCR SERPLBLD CKD-EPI 2021: 72 ML/MIN/1.73M2
ERYTHROCYTE [DISTWIDTH] IN BLOOD BY AUTOMATED COUNT: 12.5 % (ref 10–15)
GLUCOSE SERPL-MCNC: 93 MG/DL (ref 70–99)
HCT VFR BLD AUTO: 42.3 % (ref 40–53)
HGB BLD-MCNC: 14.2 G/DL (ref 13.3–17.7)
MCH RBC QN AUTO: 27.5 PG (ref 26.5–33)
MCHC RBC AUTO-ENTMCNC: 33.6 G/DL (ref 31.5–36.5)
MCV RBC AUTO: 82 FL (ref 78–100)
PLATELET # BLD AUTO: 246 10E3/UL (ref 150–450)
POTASSIUM SERPL-SCNC: 3.9 MMOL/L (ref 3.4–5.3)
PROT SERPL-MCNC: 6.9 G/DL (ref 6.4–8.3)
RBC # BLD AUTO: 5.16 10E6/UL (ref 4.4–5.9)
SIROLIMUS BLD-MCNC: 6.1 UG/L (ref 5–15)
SODIUM SERPL-SCNC: 136 MMOL/L (ref 135–145)
TME LAST DOSE: NORMAL H
TME LAST DOSE: NORMAL H
WBC # BLD AUTO: 7.9 10E3/UL (ref 4–11)

## 2023-11-20 PROCEDURE — 85027 COMPLETE CBC AUTOMATED: CPT | Performed by: PATHOLOGY

## 2023-11-20 PROCEDURE — 80195 ASSAY OF SIROLIMUS: CPT | Performed by: INTERNAL MEDICINE

## 2023-11-20 PROCEDURE — 99000 SPECIMEN HANDLING OFFICE-LAB: CPT | Performed by: PATHOLOGY

## 2023-11-20 PROCEDURE — 36415 COLL VENOUS BLD VENIPUNCTURE: CPT | Performed by: PATHOLOGY

## 2023-11-20 PROCEDURE — 80053 COMPREHEN METABOLIC PANEL: CPT | Performed by: PATHOLOGY

## 2023-11-20 PROCEDURE — 82248 BILIRUBIN DIRECT: CPT | Performed by: PATHOLOGY

## 2023-11-20 RX ORDER — SIROLIMUS 1 MG/1
1 TABLET, FILM COATED ORAL DAILY
Qty: 90 TABLET | Refills: 3 | Status: SHIPPED | OUTPATIENT
Start: 2023-11-20

## 2024-01-29 ENCOUNTER — LAB (OUTPATIENT)
Dept: LAB | Facility: CLINIC | Age: 54
End: 2024-01-29
Payer: COMMERCIAL

## 2024-01-29 DIAGNOSIS — Z94.4 LIVER REPLACED BY TRANSPLANT (H): ICD-10-CM

## 2024-01-29 LAB
ALBUMIN MFR UR ELPH: 12.4 MG/DL
ALBUMIN SERPL BCG-MCNC: 4.1 G/DL (ref 3.5–5.2)
ALBUMIN UR-MCNC: NEGATIVE MG/DL
ALP SERPL-CCNC: 44 U/L (ref 40–150)
ALT SERPL W P-5'-P-CCNC: 22 U/L (ref 0–70)
ANION GAP SERPL CALCULATED.3IONS-SCNC: 10 MMOL/L (ref 7–15)
APPEARANCE UR: CLEAR
AST SERPL W P-5'-P-CCNC: 22 U/L (ref 0–45)
BILIRUB DIRECT SERPL-MCNC: 0.34 MG/DL (ref 0–0.3)
BILIRUB SERPL-MCNC: 1.3 MG/DL
BILIRUB UR QL STRIP: NEGATIVE
BUN SERPL-MCNC: 14.9 MG/DL (ref 6–20)
CALCIUM SERPL-MCNC: 9.2 MG/DL (ref 8.6–10)
CHLORIDE SERPL-SCNC: 102 MMOL/L (ref 98–107)
CHOLEST SERPL-MCNC: 143 MG/DL
COLOR UR AUTO: YELLOW
CREAT SERPL-MCNC: 1.22 MG/DL (ref 0.67–1.17)
CREAT UR-MCNC: 76.8 MG/DL
DEPRECATED HCO3 PLAS-SCNC: 24 MMOL/L (ref 22–29)
EGFRCR SERPLBLD CKD-EPI 2021: 71 ML/MIN/1.73M2
ERYTHROCYTE [DISTWIDTH] IN BLOOD BY AUTOMATED COUNT: 13 % (ref 10–15)
FASTING STATUS PATIENT QL REPORTED: YES
GLUCOSE SERPL-MCNC: 93 MG/DL (ref 70–99)
GLUCOSE UR STRIP-MCNC: NEGATIVE MG/DL
HCT VFR BLD AUTO: 41.8 % (ref 40–53)
HDLC SERPL-MCNC: 49 MG/DL
HGB BLD-MCNC: 14.1 G/DL (ref 13.3–17.7)
HGB UR QL STRIP: NEGATIVE
KETONES UR STRIP-MCNC: NEGATIVE MG/DL
LDLC SERPL CALC-MCNC: 80 MG/DL
LEUKOCYTE ESTERASE UR QL STRIP: NEGATIVE
MCH RBC QN AUTO: 27.7 PG (ref 26.5–33)
MCHC RBC AUTO-ENTMCNC: 33.7 G/DL (ref 31.5–36.5)
MCV RBC AUTO: 82 FL (ref 78–100)
NITRATE UR QL: NEGATIVE
NONHDLC SERPL-MCNC: 94 MG/DL
PH UR STRIP: 5.5 [PH] (ref 5–7)
PHOSPHATE SERPL-MCNC: 2.3 MG/DL (ref 2.5–4.5)
PLATELET # BLD AUTO: 236 10E3/UL (ref 150–450)
POTASSIUM SERPL-SCNC: 4 MMOL/L (ref 3.4–5.3)
PROT SERPL-MCNC: 6.4 G/DL (ref 6.4–8.3)
PROT/CREAT 24H UR: 0.16 MG/MG CR (ref 0–0.2)
RBC # BLD AUTO: 5.09 10E6/UL (ref 4.4–5.9)
SIROLIMUS BLD-MCNC: 4 UG/L (ref 5–15)
SODIUM SERPL-SCNC: 136 MMOL/L (ref 135–145)
SP GR UR STRIP: 1.01 (ref 1–1.03)
TME LAST DOSE: ABNORMAL H
TME LAST DOSE: ABNORMAL H
TRIGL SERPL-MCNC: 68 MG/DL
UROBILINOGEN UR STRIP-MCNC: NORMAL MG/DL
WBC # BLD AUTO: 6.1 10E3/UL (ref 4–11)

## 2024-01-29 PROCEDURE — 80053 COMPREHEN METABOLIC PANEL: CPT | Performed by: PATHOLOGY

## 2024-01-29 PROCEDURE — 36415 COLL VENOUS BLD VENIPUNCTURE: CPT | Performed by: PATHOLOGY

## 2024-01-29 PROCEDURE — 82248 BILIRUBIN DIRECT: CPT | Performed by: PATHOLOGY

## 2024-01-29 PROCEDURE — 85027 COMPLETE CBC AUTOMATED: CPT | Performed by: PATHOLOGY

## 2024-01-29 PROCEDURE — 99000 SPECIMEN HANDLING OFFICE-LAB: CPT | Performed by: PATHOLOGY

## 2024-01-29 PROCEDURE — 80195 ASSAY OF SIROLIMUS: CPT | Performed by: INTERNAL MEDICINE

## 2024-01-29 PROCEDURE — 84100 ASSAY OF PHOSPHORUS: CPT | Performed by: PATHOLOGY

## 2024-01-29 PROCEDURE — 84156 ASSAY OF PROTEIN URINE: CPT | Performed by: PATHOLOGY

## 2024-01-29 PROCEDURE — 80061 LIPID PANEL: CPT | Performed by: PATHOLOGY

## 2024-01-29 PROCEDURE — 81003 URINALYSIS AUTO W/O SCOPE: CPT | Performed by: PATHOLOGY

## 2024-01-30 ENCOUNTER — OFFICE VISIT (OUTPATIENT)
Dept: TRANSPLANT | Facility: CLINIC | Age: 54
End: 2024-01-30
Attending: INTERNAL MEDICINE
Payer: COMMERCIAL

## 2024-01-30 VITALS
DIASTOLIC BLOOD PRESSURE: 102 MMHG | BODY MASS INDEX: 27.12 KG/M2 | HEART RATE: 72 BPM | TEMPERATURE: 98.3 F | WEIGHT: 200 LBS | SYSTOLIC BLOOD PRESSURE: 170 MMHG | OXYGEN SATURATION: 100 %

## 2024-01-30 DIAGNOSIS — Z94.0 KIDNEY REPLACED BY TRANSPLANT: ICD-10-CM

## 2024-01-30 DIAGNOSIS — E55.9 VITAMIN D DEFICIENCY: ICD-10-CM

## 2024-01-30 DIAGNOSIS — I15.1 HTN, KIDNEY TRANSPLANT RELATED: Primary | ICD-10-CM

## 2024-01-30 DIAGNOSIS — Z94.0 HTN, KIDNEY TRANSPLANT RELATED: Primary | ICD-10-CM

## 2024-01-30 DIAGNOSIS — Z94.4 LIVER REPLACED BY TRANSPLANT (H): ICD-10-CM

## 2024-01-30 DIAGNOSIS — N18.2 CKD (CHRONIC KIDNEY DISEASE) STAGE 2, GFR 60-89 ML/MIN: ICD-10-CM

## 2024-01-30 DIAGNOSIS — D84.9 IMMUNOSUPPRESSED STATUS (H): ICD-10-CM

## 2024-01-30 DIAGNOSIS — Z48.298 AFTERCARE FOLLOWING ORGAN TRANSPLANT: ICD-10-CM

## 2024-01-30 PROCEDURE — 99214 OFFICE O/P EST MOD 30 MIN: CPT | Performed by: INTERNAL MEDICINE

## 2024-01-30 PROCEDURE — G2211 COMPLEX E/M VISIT ADD ON: HCPCS | Performed by: INTERNAL MEDICINE

## 2024-01-30 PROCEDURE — 99213 OFFICE O/P EST LOW 20 MIN: CPT | Performed by: INTERNAL MEDICINE

## 2024-01-30 RX ORDER — AMLODIPINE BESYLATE 10 MG/1
10 TABLET ORAL AT BEDTIME
Qty: 90 TABLET | Refills: 3 | Status: SHIPPED | OUTPATIENT
Start: 2024-01-30

## 2024-01-30 ASSESSMENT — PAIN SCALES - GENERAL: PAINLEVEL: NO PAIN (0)

## 2024-01-30 NOTE — PROGRESS NOTES
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  Transaminases Stable.  Followed by Transplant Surgery.     # Immunosuppression: Mycophenolate mofetil (dose 750 mg every 12 hours) and Sirolimus (goal 4-6)   - Continue with intensive monitoring of immunosuppression for efficacy and toxicity.   - Changes: Not at this time    # Infection Prophylaxis:   Last CD4 Level: 514 (Oct/2023)  - PJP: None    # Hypertension: Borderline control;  Goal BP: < 130/80   - Changes: Yes - Will increase amlodipine to 10 mg nightly.    # Mineral Bone Disorder:   - Vitamin D; level: High        On supplement: Yes  - Calcium; level: Normal        On supplement: Yes    # Skin Cancer Risk:    - Discussed sun protection and recommend regular follow up with Dermatology.    # Medical Compliance: Yes    # Health Maintenance and Vaccination Review: Not Reviewed    # Transplant History:  Etiology of Kidney Failure: Hepatorenal syndrome (HRS)  Tx: DDKT (K) and Liver Tx (K)  Transplant: 1/14/2013 (Kidney), 1/14/2013 (Liver)  Significant changes in immunosuppression:  Changed from tacrolimus to sirolimus due to significant neuropathy symptoms.  Significant transplant-related complications: None    Transplant Office Phone Number: [phone]    Assessment and plan was discussed with the patient and he voiced his understanding and agreement.    Return visit: Return in about 1 year (around 1/30/2025).    Doyle Timmons MD    The longitudinal plan of care for kidney transplant was addressed during this visit. Due to the added complexity in  care, I will continue to support Jarod Quesada in the subsequent management of this condition(s) and with the ongoing continuity of care of this condition(s).    Chief Complaint   Mr. Cherelle Quesada is a 53 year old here for kidney transplant and immunosuppression management.    History of Present Illness    Mr. Cherelle Quesada reports feeling good overall with some medical complaints.  Since last clinic visit, patient reports no hospitalizations or new medical complaints and has been doing well overall.  His energy level is good and remains normal.  He is active and does get some exercise.  Denies any chest pain or shortness of breath with exertion.  No leg swelling.    Appetite is good and he has gained ~ 5 lbs lately after previously losing a considerable amount of weight.  No nausea, vomiting or diarrhea.  No heartburn symptoms.  No fever, sweats or chills.  No night sweats.    Home BP:  120-130/80-90s    Problem List   Patient Active Problem List   Diagnosis    Generalized anxiety disorder    Kidney replaced by transplant    Liver replaced by transplant (H)    Immunosuppressed status (H24)    HTN, kidney transplant related    Secondary renal hyperparathyroidism (H24)    Aftercare following organ transplant    Status post hip replacement, right    Vitamin D deficiency    CKD (chronic kidney disease) stage 2, GFR 60-89 ml/min    Obesity       Allergies   Allergies   Allergen Reactions    Paxil [Paroxetine] Other (See Comments)     Caused Severe Tremor    Benzocaine (Topical) Rash    Chlorhexidine Rash    Phenol Rash       Medications   Current Outpatient Medications   Medication Sig    amLODIPine (NORVASC) 10 MG tablet Take 1 tablet (10 mg) by mouth at bedtime    aspirin (ASA) 81 MG chewable tablet Take 81 mg by mouth daily    calcium carbonate 500 mg, elemental, (OSCAL 500) 1250 (500 Ca) MG TABS tablet Take by mouth daily    CHOLECALCIFEROL PO Take 5,000 Units by mouth daily     losartan (COZAAR) 100  MG tablet Take 1 tablet (100 mg) by mouth At Bedtime    multivitamin w/minerals (THERA-VIT-M) tablet Take 1 tablet by mouth daily    mycophenolate (GENERIC EQUIVALENT) 250 MG capsule Take 3 capsules (750 mg) by mouth 2 times daily    sildenafil (VIAGRA) 50 MG tablet Take 0.5-1 tablets (25-50 mg) by mouth daily as needed for erectile dysfunction Take 30 min to 4 hours before intercourse.  Never use with nitroglycerin, terazosin or doxazosin.    sirolimus (GENERIC EQUIVALENT) 1 MG tablet Take 1 tablet (1 mg) by mouth daily     No current facility-administered medications for this visit.     Medications Discontinued During This Encounter   Medication Reason    amLODIPine (NORVASC) 5 MG tablet        Physical Exam   Vital Signs: BP (!) 170/102 (BP Location: Left arm, Patient Position: Sitting, Cuff Size: Adult Regular)   Pulse 72   Temp 98.3  F (36.8  C) (Oral)   Wt 90.7 kg (200 lb)   SpO2 100%   BMI 27.12 kg/m      GENERAL APPEARANCE: alert and no distress  HENT: mouth without ulcers or lesions  RESP: lungs clear to auscultation - no rales, rhonchi or wheezes  CV: regular rhythm, normal rate, no rub, no murmur  EDEMA: no LE edema bilaterally  ABDOMEN: soft, nondistended, nontender, bowel sounds normal  MS: extremities normal - no gross deformities noted, no evidence of inflammation in joints, no muscle tenderness  SKIN: no rash  TX KIDNEY: normal  DIALYSIS ACCESS: none    Data         Latest Ref Rng & Units 1/29/2024     8:20 AM 11/20/2023     7:41 AM 10/14/2023     8:06 AM   Renal   Sodium 135 - 145 mmol/L 136  136  137    K 3.4 - 5.3 mmol/L 4.0  3.9  4.3    Cl 98 - 107 mmol/L 102  103  104    Cl (external) 98 - 107 mmol/L 102  103  104    CO2 22 - 29 mmol/L 24  23  24    Urea Nitrogen 6.0 - 20.0 mg/dL 14.9  19.2  16.1    Creatinine 0.67 - 1.17 mg/dL 1.22  1.20  1.27    Glucose 70 - 99 mg/dL 93  93  97    Calcium 8.6 - 10.0 mg/dL 9.2  9.4  9.4          Latest Ref Rng & Units 1/29/2024     8:20 AM 10/14/2023      8:06 AM 7/24/2015     7:48 AM   Bone Health   Phosphorus 2.5 - 4.5 mg/dL 2.3   2.4    Vit D Def 20 - 50 ng/mL  66           Latest Ref Rng & Units 1/29/2024     8:20 AM 11/20/2023     7:41 AM 10/14/2023     8:06 AM   Heme   WBC 4.0 - 11.0 10e3/uL 6.1  7.9  4.6    Hgb 13.3 - 17.7 g/dL 14.1  14.2  14.6    Plt 150 - 450 10e3/uL 236  246  251          Latest Ref Rng & Units 1/29/2024     8:20 AM 11/20/2023     7:41 AM 10/14/2023     8:06 AM   Liver   AP 40 - 150 U/L 44  50  48    TBili <=1.2 mg/dL 1.3  1.4  1.4    Bilirubin Direct 0.00 - 0.30 mg/dL 0.34  0.38  0.34    ALT 0 - 70 U/L 22  22  22    AST 0 - 45 U/L 22  26  23    Tot Protein 6.4 - 8.3 g/dL 6.4  6.9  6.9    Albumin 3.5 - 5.2 g/dL 4.1  4.3  4.4          Latest Ref Rng & Units 1/15/2013     4:03 AM 1/13/2013     9:04 PM 1/6/2013     8:20 PM   Pancreas   Amylase 30 - 110 U/L <30  59  116    Lipase 20 - 250 U/L 28   263          Latest Ref Rng & Units 1/8/2013     6:40 AM 10/3/2012     7:45 AM   Iron studies   Iron 35 - 180 ug/dL 15  82    Iron Saturation Index 15 - 46 % 12  70    Ferritin 20 - 300 ng/mL 500           Latest Ref Rng & Units 2/3/2014     8:18 AM 1/21/2014     7:32 AM 1/30/2013     7:58 AM   [facility]   BK Spec    Plasma, EDTA anticoagulant    BK Res <1000 copies/mL   <1000    BK Log <3.0 Log copies/mL   <3.0  The lower limit of detection for this assay is 1000 copies/mL.  Real-time TaqMan   PCR was performed using BK primers and probe for the detection of a 90 bp   portion of the  1 gene.  The performance characteristics were validated by   the [facility].   It has not been cleared or approved by the U.S. Food and Drug Administration.    Hep B Core NEG  Negative     Hep B Surf  1.2              Recent Labs   Lab Test 05/15/17  0741 10/04/18  0820 10/15/18  0748   DOSMPA 8p 5.14.2017 Not Provided Not Provided   MPACID 1.20 0.97* 1.10   MPAG 51.8 70.7 76.6

## 2024-01-30 NOTE — LETTER
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  Transaminases Stable.  Followed by Transplant Surgery.     # Immunosuppression: Mycophenolate mofetil (dose 750 mg every 12 hours) and Sirolimus (goal 4-6)   - Continue with intensive monitoring of immunosuppression for efficacy and toxicity.   - Changes: Not at this time    # Infection Prophylaxis:   Last CD4 Level: 514 (Oct/2023)  - PJP: None    # Hypertension: Borderline control;  Goal BP: < 130/80   - Changes: Yes - Will increase amlodipine to 10 mg nightly.    # Mineral Bone Disorder:   - Vitamin D; level: High        On supplement: Yes  - Calcium; level: Normal        On supplement: Yes    # Skin Cancer Risk:    - Discussed sun protection and recommend regular follow up with Dermatology.    # Medical Compliance: Yes    # Health Maintenance and Vaccination Review: Not Reviewed    # Transplant History:  Etiology of Kidney Failure: Hepatorenal syndrome (HRS)  Tx: DDKT (SLK) and Liver Tx (K)  Transplant: 1/14/2013 (Kidney), 1/14/2013 (Liver)  Significant changes in immunosuppression:  Changed from tacrolimus to sirolimus due to significant neuropathy symptoms.  Significant transplant-related complications: None    Transplant Office Phone Number: [phone]    Assessment and plan was discussed with the patient and he voiced his understanding and agreement.    Return visit: Return in about 1 year (around 1/30/2025).    Doyle Timmons MD    The longitudinal plan of care  for kidney transplant was addressed during this visit. Due to the added complexity in care, I will continue to support Jarod Quesada in the subsequent management of this condition(s) and with the ongoing continuity of care of this condition(s).    Chief Complaint  Mr. Cherelle Quesada is a 53 year old here for kidney transplant and immunosuppression management.    History of Present Illness   Mr. Cherelle Quesada reports feeling good overall with some medical complaints.  Since last clinic visit, patient reports no hospitalizations or new medical complaints and has been doing well overall.  His energy level is good and remains normal.  He is active and does get some exercise.  Denies any chest pain or shortness of breath with exertion.  No leg swelling.    Appetite is good and he has gained ~ 5 lbs lately after previously losing a considerable amount of weight.  No nausea, vomiting or diarrhea.  No heartburn symptoms.  No fever, sweats or chills.  No night sweats.    Home BP:  120-130/80-90s    Problem List  Patient Active Problem List   Diagnosis     Generalized anxiety disorder     Kidney replaced by transplant     Liver replaced by transplant (H)     Immunosuppressed status (H24)     HTN, kidney transplant related     Secondary renal hyperparathyroidism (H24)     Aftercare following organ transplant     Status post hip replacement, right     Vitamin D deficiency     CKD (chronic kidney disease) stage 2, GFR 60-89 ml/min     Obesity       Allergies  Allergies   Allergen Reactions     Paxil [Paroxetine] Other (See Comments)     Caused Severe Tremor     Benzocaine (Topical) Rash     Chlorhexidine Rash     Phenol Rash       Medications  Current Outpatient Medications   Medication Sig     amLODIPine (NORVASC) 10 MG tablet Take 1 tablet (10 mg) by mouth at bedtime     aspirin (ASA) 81 MG chewable tablet Take 81 mg by mouth daily     calcium carbonate 500 mg, elemental, (OSCAL 500) 1250 (500 Ca) MG TABS tablet  Take by mouth daily     CHOLECALCIFEROL PO Take 5,000 Units by mouth daily      losartan (COZAAR) 100 MG tablet Take 1 tablet (100 mg) by mouth At Bedtime     multivitamin w/minerals (THERA-VIT-M) tablet Take 1 tablet by mouth daily     mycophenolate (GENERIC EQUIVALENT) 250 MG capsule Take 3 capsules (750 mg) by mouth 2 times daily     sildenafil (VIAGRA) 50 MG tablet Take 0.5-1 tablets (25-50 mg) by mouth daily as needed for erectile dysfunction Take 30 min to 4 hours before intercourse.  Never use with nitroglycerin, terazosin or doxazosin.     sirolimus (GENERIC EQUIVALENT) 1 MG tablet Take 1 tablet (1 mg) by mouth daily     No current facility-administered medications for this visit.     Medications Discontinued During This Encounter   Medication Reason     amLODIPine (NORVASC) 5 MG tablet        Physical Exam  Vital Signs: BP (!) 170/102 (BP Location: Left arm, Patient Position: Sitting, Cuff Size: Adult Regular)   Pulse 72   Temp 98.3  F (36.8  C) (Oral)   Wt 90.7 kg (200 lb)   SpO2 100%   BMI 27.12 kg/m      GENERAL APPEARANCE: alert and no distress  HENT: mouth without ulcers or lesions  RESP: lungs clear to auscultation - no rales, rhonchi or wheezes  CV: regular rhythm, normal rate, no rub, no murmur  EDEMA: no LE edema bilaterally  ABDOMEN: soft, nondistended, nontender, bowel sounds normal  MS: extremities normal - no gross deformities noted, no evidence of inflammation in joints, no muscle tenderness  SKIN: no rash  TX KIDNEY: normal  DIALYSIS ACCESS: none    Data        Latest Ref Rng & Units 1/29/2024     8:20 AM 11/20/2023     7:41 AM 10/14/2023     8:06 AM   Renal   Sodium 135 - 145 mmol/L 136  136  137    K 3.4 - 5.3 mmol/L 4.0  3.9  4.3    Cl 98 - 107 mmol/L 102  103  104    Cl (external) 98 - 107 mmol/L 102  103  104    CO2 22 - 29 mmol/L 24  23  24    Urea Nitrogen 6.0 - 20.0 mg/dL 14.9  19.2  16.1    Creatinine 0.67 - 1.17 mg/dL 1.22  1.20  1.27    Glucose 70 - 99 mg/dL 93  93  97     Calcium 8.6 - 10.0 mg/dL 9.2  9.4  9.4          Latest Ref Rng & Units 1/29/2024     8:20 AM 10/14/2023     8:06 AM 7/24/2015     7:48 AM   Bone Health   Phosphorus 2.5 - 4.5 mg/dL 2.3   2.4    Vit D Def 20 - 50 ng/mL  66           Latest Ref Rng & Units 1/29/2024     8:20 AM 11/20/2023     7:41 AM 10/14/2023     8:06 AM   Heme   WBC 4.0 - 11.0 10e3/uL 6.1  7.9  4.6    Hgb 13.3 - 17.7 g/dL 14.1  14.2  14.6    Plt 150 - 450 10e3/uL 236  246  251          Latest Ref Rng & Units 1/29/2024     8:20 AM 11/20/2023     7:41 AM 10/14/2023     8:06 AM   Liver   AP 40 - 150 U/L 44  50  48    TBili <=1.2 mg/dL 1.3  1.4  1.4    Bilirubin Direct 0.00 - 0.30 mg/dL 0.34  0.38  0.34    ALT 0 - 70 U/L 22  22  22    AST 0 - 45 U/L 22  26  23    Tot Protein 6.4 - 8.3 g/dL 6.4  6.9  6.9    Albumin 3.5 - 5.2 g/dL 4.1  4.3  4.4          Latest Ref Rng & Units 1/15/2013     4:03 AM 1/13/2013     9:04 PM 1/6/2013     8:20 PM   Pancreas   Amylase 30 - 110 U/L <30  59  116    Lipase 20 - 250 U/L 28   263          Latest Ref Rng & Units 1/8/2013     6:40 AM 10/3/2012     7:45 AM   Iron studies   Iron 35 - 180 ug/dL 15  82    Iron Saturation Index 15 - 46 % 12  70    Ferritin 20 - 300 ng/mL 500           Latest Ref Rng & Units 2/3/2014     8:18 AM 1/21/2014     7:32 AM 1/30/2013     7:58 AM   [facility]   BK Spec    Plasma, EDTA anticoagulant    BK Res <1000 copies/mL   <1000    BK Log <3.0 Log copies/mL   <3.0  The lower limit of detection for this assay is 1000 copies/mL.  Real-time TaqMan   PCR was performed using BK primers and probe for the detection of a 90 bp   portion of the  1 gene.  The performance characteristics were validated by   the [facility] [place].   It has not been cleared or approved by the U.S. Food and Drug Administration.    Hep B Core NEG  Negative     Hep B Surf  1.2              Recent Labs   Lab Test 05/15/17  0741 10/04/18  0820 10/15/18  0748   DOSA 8p 5.14.2017 Not  Provided Not Provided   MPACID 1.20 0.97* 1.10   MPAG 51.8 70.7 76.6       Doyle Timmons MD

## 2024-01-30 NOTE — LETTER
1/30/2024         RE: Jarod Quesada  [address] [place] [address]        Dear Colleague,    Thank you for referring your patient, Jarod Quesada, to the [facility]. Please see a copy of my visit note below.    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  Transaminases Stable.  Followed by Transplant Surgery.     # Immunosuppression: Mycophenolate mofetil (dose 750 mg every 12 hours) and Sirolimus (goal 4-6)   - Continue with intensive monitoring of immunosuppression for efficacy and toxicity.   - Changes: Not at this time    # Infection Prophylaxis:   Last CD4 Level: 514 (Oct/2023)  - PJP: None    # Hypertension: Borderline control;  Goal BP: < 130/80   - Changes: Yes - Will increase amlodipine to 10 mg nightly.    # Mineral Bone Disorder:   - Vitamin D; level: High        On supplement: Yes  - Calcium; level: Normal        On supplement: Yes    # Skin Cancer Risk:    - Discussed sun protection and recommend regular follow up with Dermatology.    # Medical Compliance: Yes    # Health Maintenance and Vaccination Review: Not Reviewed    # Transplant History:  Etiology of Kidney Failure: Hepatorenal syndrome (HRS)  Tx: DDKT (SLK) and Liver Tx (K)  Transplant: 1/14/2013 (Kidney), 1/14/2013 (Liver)  Significant changes in immunosuppression:  Changed from tacrolimus to sirolimus due to significant neuropathy symptoms.  Significant transplant-related complications: None    Transplant Office Phone Number: [phone]    Assessment and plan was discussed with the  patient and he voiced his understanding and agreement.    Return visit: Return in about 1 year (around 1/30/2025).    Doyle Timmons MD    The longitudinal plan of care for kidney transplant was addressed during this visit. Due to the added complexity in care, I will continue to support Jarod Quesada in the subsequent management of this condition(s) and with the ongoing continuity of care of this condition(s).    Chief Complaint  Mr. Cherelle Quesada is a 53 year old here for kidney transplant and immunosuppression management.    History of Present Illness   Mr. Cherelle Quesada reports feeling good overall with some medical complaints.  Since last clinic visit, patient reports no hospitalizations or new medical complaints and has been doing well overall.  His energy level is good and remains normal.  He is active and does get some exercise.  Denies any chest pain or shortness of breath with exertion.  No leg swelling.    Appetite is good and he has gained ~ 5 lbs lately after previously losing a considerable amount of weight.  No nausea, vomiting or diarrhea.  No heartburn symptoms.  No fever, sweats or chills.  No night sweats.    Home BP:  120-130/80-90s    Problem List  Patient Active Problem List   Diagnosis     Generalized anxiety disorder     Kidney replaced by transplant     Liver replaced by transplant (H)     Immunosuppressed status (H24)     HTN, kidney transplant related     Secondary renal hyperparathyroidism (H24)     Aftercare following organ transplant     Status post hip replacement, right     Vitamin D deficiency     CKD (chronic kidney disease) stage 2, GFR 60-89 ml/min     Obesity       Allergies  Allergies   Allergen Reactions     Paxil [Paroxetine] Other (See Comments)     Caused Severe Tremor     Benzocaine (Topical) Rash     Chlorhexidine Rash     Phenol Rash       Medications  Current Outpatient Medications   Medication Sig     amLODIPine (NORVASC) 10 MG tablet Take 1 tablet  (10 mg) by mouth at bedtime     aspirin (ASA) 81 MG chewable tablet Take 81 mg by mouth daily     calcium carbonate 500 mg, elemental, (OSCAL 500) 1250 (500 Ca) MG TABS tablet Take by mouth daily     CHOLECALCIFEROL PO Take 5,000 Units by mouth daily      losartan (COZAAR) 100 MG tablet Take 1 tablet (100 mg) by mouth At Bedtime     multivitamin w/minerals (THERA-VIT-M) tablet Take 1 tablet by mouth daily     mycophenolate (GENERIC EQUIVALENT) 250 MG capsule Take 3 capsules (750 mg) by mouth 2 times daily     sildenafil (VIAGRA) 50 MG tablet Take 0.5-1 tablets (25-50 mg) by mouth daily as needed for erectile dysfunction Take 30 min to 4 hours before intercourse.  Never use with nitroglycerin, terazosin or doxazosin.     sirolimus (GENERIC EQUIVALENT) 1 MG tablet Take 1 tablet (1 mg) by mouth daily     No current facility-administered medications for this visit.     Medications Discontinued During This Encounter   Medication Reason     amLODIPine (NORVASC) 5 MG tablet        Physical Exam  Vital Signs: BP (!) 170/102 (BP Location: Left arm, Patient Position: Sitting, Cuff Size: Adult Regular)   Pulse 72   Temp 98.3  F (36.8  C) (Oral)   Wt 90.7 kg (200 lb)   SpO2 100%   BMI 27.12 kg/m      GENERAL APPEARANCE: alert and no distress  HENT: mouth without ulcers or lesions  RESP: lungs clear to auscultation - no rales, rhonchi or wheezes  CV: regular rhythm, normal rate, no rub, no murmur  EDEMA: no LE edema bilaterally  ABDOMEN: soft, nondistended, nontender, bowel sounds normal  MS: extremities normal - no gross deformities noted, no evidence of inflammation in joints, no muscle tenderness  SKIN: no rash  TX KIDNEY: normal  DIALYSIS ACCESS: none    Data        Latest Ref Rng & Units 1/29/2024     8:20 AM 11/20/2023     7:41 AM 10/14/2023     8:06 AM   Renal   Sodium 135 - 145 mmol/L 136  136  137    K 3.4 - 5.3 mmol/L 4.0  3.9  4.3    Cl 98 - 107 mmol/L 102  103  104    Cl (external) 98 - 107 mmol/L 102  103  104     CO2 22 - 29 mmol/L 24  23  24    Urea Nitrogen 6.0 - 20.0 mg/dL 14.9  19.2  16.1    Creatinine 0.67 - 1.17 mg/dL 1.22  1.20  1.27    Glucose 70 - 99 mg/dL 93  93  97    Calcium 8.6 - 10.0 mg/dL 9.2  9.4  9.4          Latest Ref Rng & Units 1/29/2024     8:20 AM 10/14/2023     8:06 AM 7/24/2015     7:48 AM   Bone Health   Phosphorus 2.5 - 4.5 mg/dL 2.3   2.4    Vit D Def 20 - 50 ng/mL  66           Latest Ref Rng & Units 1/29/2024     8:20 AM 11/20/2023     7:41 AM 10/14/2023     8:06 AM   Heme   WBC 4.0 - 11.0 10e3/uL 6.1  7.9  4.6    Hgb 13.3 - 17.7 g/dL 14.1  14.2  14.6    Plt 150 - 450 10e3/uL 236  246  251          Latest Ref Rng & Units 1/29/2024     8:20 AM 11/20/2023     7:41 AM 10/14/2023     8:06 AM   Liver   AP 40 - 150 U/L 44  50  48    TBili <=1.2 mg/dL 1.3  1.4  1.4    Bilirubin Direct 0.00 - 0.30 mg/dL 0.34  0.38  0.34    ALT 0 - 70 U/L 22  22  22    AST 0 - 45 U/L 22  26  23    Tot Protein 6.4 - 8.3 g/dL 6.4  6.9  6.9    Albumin 3.5 - 5.2 g/dL 4.1  4.3  4.4          Latest Ref Rng & Units 1/15/2013     4:03 AM 1/13/2013     9:04 PM 1/6/2013     8:20 PM   Pancreas   Amylase 30 - 110 U/L <30  59  116    Lipase 20 - 250 U/L 28   263          Latest Ref Rng & Units 1/8/2013     6:40 AM 10/3/2012     7:45 AM   Iron studies   Iron 35 - 180 ug/dL 15  82    Iron Saturation Index 15 - 46 % 12  70    Ferritin 20 - 300 ng/mL 500           Latest Ref Rng & Units 2/3/2014     8:18 AM 1/21/2014     7:32 AM 1/30/2013     7:58 AM   [facility] Vi[facility]   BK Spec    Plasma, EDTA anticoagulant    BK Res <1000 copies/mL   <1000    BK Log <3.0 Log copies/mL   <3.0  The lower limit of detection for this assay is 1000 copies/mL.  Real-time TaqMan   PCR was performed using BK primers and probe for the detection of a 90 bp   portion of the  1 gene.  The performance characteristics were validated by   the [facility].   It has not been cleared or approved by the U.S. Food and Drug  Administration.    Hep B Core NEG  Negative     Hep B Surf  1.2              Recent Labs   Lab Test 05/15/17  0741 10/04/18  0820 10/15/18  0748   DOSMPA 8p 5.14.2017 Not Provided Not Provided   MPACID 1.20 0.97* 1.10   MPAG 51.8 70.7 76.6         Again, thank you for allowing me to participate in the care of your patient.        Sincerely,        Doyle Timmons MD

## 2024-01-30 NOTE — PATIENT INSTRUCTIONS
Patient Recommendations:  - Increase amlodipine to 10 mg nightly.    Transplant Patient Information  Your Post Transplant Coordinator is: Bayron Murry  For non urgent items, we encourage you to contact your coordinator/care team online via [employer]  You and your care team can also contact your transplant coordinator Monday - Friday, 8am - 5pm at [phone] (Option 2 to reach the coordinator or Option 4 to schedule an appointment).  After hours for urgent matters, please call [facility] at [phone].

## 2024-02-05 ENCOUNTER — VIRTUAL VISIT (OUTPATIENT)
Dept: GASTROENTEROLOGY | Facility: CLINIC | Age: 54
End: 2024-02-05
Attending: INTERNAL MEDICINE
Payer: COMMERCIAL

## 2024-02-05 VITALS — BODY MASS INDEX: 23.72 KG/M2 | HEIGHT: 78 IN | WEIGHT: 205 LBS

## 2024-02-05 DIAGNOSIS — D84.9 IMMUNOSUPPRESSED STATUS (H): ICD-10-CM

## 2024-02-05 DIAGNOSIS — Z94.4 LIVER REPLACED BY TRANSPLANT (H): Primary | ICD-10-CM

## 2024-02-05 PROCEDURE — 99214 OFFICE O/P EST MOD 30 MIN: CPT | Mod: 95 | Performed by: INTERNAL MEDICINE

## 2024-02-05 ASSESSMENT — PAIN SCALES - GENERAL: PAINLEVEL: NO PAIN (0)

## 2024-02-05 NOTE — PROGRESS NOTES
[facility]  LIVER TRANSPLANT CLINIC FOLLOW UP       A/P  Mr. Quesada is a 53 Y M s/p SLK 1/14/13 for alcohol related cirrhosis  Medically doing extremely well.    IS Sirolimus and MMF. At goal dose/level.  Graft function Excellent. Labs up to date and normal/stable.     Proph Discussed skin cancer screening: UTD on derm (going every 6 m)  HTN on cozaar and amlodipine. Mgmt per Dr. Timmons.  CRC screening Colonoscopy 7/6/20. Lymphoid aggregate. Due 2030.  Vaccinations UTD on all  Covid vaccine Has had x 3.  RTC 1 y in person or video    Jeannine Biggs MD  Hepatology/ Liver Transplant  [facility]  ==================================================================  PCP: Dr. Gil at [facility].   Nephrology: Dr. Jalen Emerson     SUBJECTIVE  Mr. Quesada is 53 Y M s/p K 1/14/13 for alcohol related cirrhosis.    No changes in his health. He had lost about 90 pounds and gained about 5 pounds back.    BP has been a little high and Dr. Timmons made adjustments to his meds.  EXPLANT: Cirrhosis, no HCC  IS: SRL and MMF  LABS: Up to date and normal liver tests and CBC  Liver Function Studies - Recent Labs   Lab Test 01/29/24  0820   PROTTOTAL 6.4   ALBUMIN 4.1   BILITOTAL 1.3*   ALKPHOS 44   AST 22   ALT 22     CBC RESULTS: Recent Labs   Lab Test 01/29/24  0820   WBC 6.1   RBC 5.09   HGB 14.1   HCT 41.8   MCV 82   MCH 27.7   MCHC 33.7   RDW 13.0            Lab Test 01/03/22  0826   PROTTOTAL 6.8   ALBUMIN 3.6   BILITOTAL 1.1   ALKPHOS 57   AST 27   ALT 48     Lab Test 01/03/22  0826   WBC 6.6   RBC 5.08   HGB 13.9   HCT 42.6   MCV 84   MCH 27.4   MCHC 32.6   RDW 13.4        REJECTION: None.  BILIARY ISSUES: None  STENT: No stent on abdominal imaging post transpalnt  KIDNEY FUNCTION: Sees Dr. Emerson. Stable. No proteinuria          Creatinine   Date Value Ref Range Status   01/29/2024 1.22 (H) 0.67 - 1.17 mg/dL Final   06/18/2021 1.36 (H) 0.66 - 1.25 mg/dL Final     BP: Controlled  on amlodipine, losartan. Checks at home in the 120s/70s.   PREV: UTD on screening (colonoscopy 6/29/2012 no polyps, area of ulceration. Path showed nonspecific changes. Record is located in Media tab on 9/21/12)   Colonoscopy  Derm Feb 8/2019  Flu shot had it.  DISEASE RECURRENCE: No alcohol  OTHER ISSUES:   Struggles with weight gain.   Neuropathy, on gabapentin in the past  HLD  Incisional hernia not worse  NEW ISSUES:  R ROBERTA with Dr. Scott here in early November 2018.     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 He is now a  for a [employer] lab since 2019.  Dtr Fela graduated and is living in [place]. Son Bayron is in [facility] for a year staying with a host family.  ROS: 10 point ROS neg other than the symptoms noted above in the HPI.  Exam    Gen Alert pleasant NAD  Resp No difficulty breathing. No cough  Skin No Jaundice  Eyes No icterus  Neuro STOUT  MSK no muscle wasting  Psyche Pleasant, appropriate. Well groomed.    Jarod Quesada is a 53 year old male who is being evaluated via a billable video visit.    Video-Visit Details  Type of service:  Video Visit  Video Start Time: 902  Video End Time: 912  Originating Location (pt. Location):home  Distant Location (provider location):  [facility] HEPATOLOGY CLINIC [place]      Platform used for Video Visit: [employer] or [employer]

## 2024-02-05 NOTE — LETTER
2/5/2024         RE: Jarod Quesada  [address] [place] [address]        Dear Colleague,    Thank you for referring your patient, Jarod Quesada, to the [facility] HEPATOLOGY CLINIC [place]. Please see a copy of my visit note below.    [facility]  LIVER TRANSPLANT CLINIC FOLLOW UP       A/P  Mr. Quesada is a 53 Y M s/p SLK 1/14/13 for alcohol related cirrhosis  Medically doing extremely well.    IS Sirolimus and MMF. At goal dose/level.  Graft function Excellent. Labs up to date and normal/stable.     Proph Discussed skin cancer screening: UTD on derm (going every 6 m)  HTN on cozaar and amlodipine. Mgmt per Dr. Timmons.  CRC screening Colonoscopy 7/6/20. Lymphoid aggregate. Due 2030.  Vaccinations UTD on all  Covid vaccine Has had x 3.  RTC 1 y in person or video    Jeannine Biggs MD  Hepatology/ Liver Transplant  [facility]  ==================================================================  PCP: Dr. Gil at [facility].   Nephrology: Dr. Jalen Emerson     SUBJECTIVE  Mr. Quesada is 53 Y M s/p SLK 1/14/13 for alcohol related cirrhosis.    No changes in his health. He had lost about 90 pounds and gained about 5 pounds back.    BP has been a little high and Dr. Timmons made adjustments to his meds.  EXPLANT: Cirrhosis, no HCC  IS: SRL and MMF  LABS: Up to date and normal liver tests and CBC  Liver Function Studies - Recent Labs   Lab Test 01/29/24  0820   PROTTOTAL 6.4   ALBUMIN 4.1   BILITOTAL 1.3*   ALKPHOS 44   AST 22   ALT 22     CBC RESULTS: Recent Labs   Lab Test 01/29/24  0820   WBC 6.1   RBC 5.09   HGB 14.1   HCT 41.8   MCV 82   MCH 27.7   MCHC 33.7   RDW 13.0            Lab Test 01/03/22  0826   PROTTOTAL 6.8   ALBUMIN 3.6   BILITOTAL 1.1   ALKPHOS 57   AST 27   ALT 48     Lab Test 01/03/22  0826   WBC 6.6   RBC 5.08   HGB 13.9   HCT 42.6   MCV 84   MCH 27.4   MCHC 32.6   RDW 13.4        REJECTION: None.  BILIARY ISSUES:  None  STENT: No stent on abdominal imaging post transpalnt  KIDNEY FUNCTION: Sees Dr. Emerson. Stable. No proteinuria          Creatinine   Date Value Ref Range Status   01/29/2024 1.22 (H) 0.67 - 1.17 mg/dL Final   06/18/2021 1.36 (H) 0.66 - 1.25 mg/dL Final     BP: Controlled on amlodipine, losartan. Checks at home in the 120s/70s.   PREV: UTD on screening (colonoscopy 6/29/2012 no polyps, area of ulceration. Path showed nonspecific changes. Record is located in Media tab on 9/21/12)   Colonoscopy  Derm Feb 8/2019  Flu shot had it.  DISEASE RECURRENCE: No alcohol  OTHER ISSUES:   Struggles with weight gain.   Neuropathy, on gabapentin in the past  HLD  Incisional hernia not worse  NEW ISSUES:  R ROBERTA with Dr. Scott here in early November 2018.     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  Dtr Fela graduated and is living in [place]. Son Bayron is in [facility] for a year staying with a host family.  ROS: 10 point ROS neg other than the symptoms noted above in the HPI.  Exam    Gen Alert pleasant NAD  Resp No difficulty breathing. No cough  Skin No Jaundice  Eyes No icterus  Neuro STOUT  MSK no muscle wasting  Psyche Pleasant, appropriate. Well groomed.    Jarod Quesada is a 53 year old male who is being evaluated via a billable video visit.    Video-Visit Details  Type of service:  Video Visit  Video Start Time: 902  Video End Time: 912  Originating Location (pt. Location):home  Distant Location (provider location):  [facility] HEPATOLOGY CLIN[facility] [place]      Platform used for Video Visit: [employer] or [employer]            Again, thank you for allowing me to participate in the care of your patient.        Sincerely,        Jeannine Biggs MD

## 2024-02-05 NOTE — PROGRESS NOTES
"Virtual Visit Details    Type of service:  Video Visit     Originating Location (pt. Location): {video visit patient location:413290::\"Home\"}  {PROVIDER LOCATION On-site should be selected for visits conducted from your clinic location or adjoining [facility] hospital, academic office, or other nearby [facility] building. Off-site should be selected for all other provider locations, including home:[id number]}  Distant Location (provider location):  {virtual location provider:636099}  Platform used for Video Visit: {Virtual Visit Platforms:684313::\"[employer]\"}  "

## 2024-02-05 NOTE — NURSING NOTE
Is the patient currently in the state of [place]? YES    Visit mode:VIDEO    If the visit is dropped, the patient can be reconnected by: VIDEO VISIT: Text to cell phone:   Telephone Information:   Mobile [phone]       Will anyone else be joining the visit? NO  (If patient encounters technical issues they should call [phone])    How would you like to obtain your AVS? MyChart    Are changes needed to the allergy or medication list? No    Reason for visit: Video Visit (Recheck)    Ekaterina CHARLTON

## 2024-04-05 DIAGNOSIS — Z94.0 KIDNEY REPLACED BY TRANSPLANT: ICD-10-CM

## 2024-04-05 RX ORDER — MYCOPHENOLATE MOFETIL 250 MG/1
750 CAPSULE ORAL 2 TIMES DAILY
Qty: 540 CAPSULE | Refills: 3 | Status: SHIPPED | OUTPATIENT
Start: 2024-04-05

## 2024-10-05 DIAGNOSIS — Z94.4 LIVER REPLACED BY TRANSPLANT (H): Primary | ICD-10-CM

## 2024-10-26 ENCOUNTER — LAB (OUTPATIENT)
Dept: LAB | Facility: CLINIC | Age: 54
End: 2024-10-26
Attending: INTERNAL MEDICINE
Payer: COMMERCIAL

## 2024-10-26 DIAGNOSIS — Z94.4 LIVER REPLACED BY TRANSPLANT (H): ICD-10-CM

## 2024-10-26 LAB
ALBUMIN MFR UR ELPH: 8.8 MG/DL
ALBUMIN SERPL BCG-MCNC: 4 G/DL (ref 3.5–5.2)
ALBUMIN UR-MCNC: NEGATIVE MG/DL
ALP SERPL-CCNC: 48 U/L (ref 40–150)
ALT SERPL W P-5'-P-CCNC: 20 U/L (ref 0–70)
ANION GAP SERPL CALCULATED.3IONS-SCNC: 7 MMOL/L (ref 7–15)
APPEARANCE UR: CLEAR
AST SERPL W P-5'-P-CCNC: 24 U/L (ref 0–45)
BILIRUB DIRECT SERPL-MCNC: 0.25 MG/DL (ref 0–0.3)
BILIRUB SERPL-MCNC: 1 MG/DL
BILIRUB UR QL STRIP: NEGATIVE
BUN SERPL-MCNC: 14.8 MG/DL (ref 6–20)
CALCIUM SERPL-MCNC: 9.1 MG/DL (ref 8.8–10.4)
CHLORIDE SERPL-SCNC: 104 MMOL/L (ref 98–107)
CHOLEST SERPL-MCNC: 156 MG/DL
COLOR UR AUTO: NORMAL
CREAT SERPL-MCNC: 1.31 MG/DL (ref 0.67–1.17)
CREAT UR-MCNC: 73 MG/DL
EGFRCR SERPLBLD CKD-EPI 2021: 65 ML/MIN/1.73M2
ERYTHROCYTE [DISTWIDTH] IN BLOOD BY AUTOMATED COUNT: 13.6 % (ref 10–15)
FASTING STATUS PATIENT QL REPORTED: YES
FASTING STATUS PATIENT QL REPORTED: YES
GLUCOSE SERPL-MCNC: 103 MG/DL (ref 70–99)
GLUCOSE UR STRIP-MCNC: NEGATIVE MG/DL
HCO3 SERPL-SCNC: 25 MMOL/L (ref 22–29)
HCT VFR BLD AUTO: 41.9 % (ref 40–53)
HDLC SERPL-MCNC: 52 MG/DL
HGB BLD-MCNC: 13.6 G/DL (ref 13.3–17.7)
HGB UR QL STRIP: NEGATIVE
KETONES UR STRIP-MCNC: NEGATIVE MG/DL
LDLC SERPL CALC-MCNC: 94 MG/DL
LEUKOCYTE ESTERASE UR QL STRIP: NEGATIVE
MAGNESIUM SERPL-MCNC: 2 MG/DL (ref 1.7–2.3)
MCH RBC QN AUTO: 26.4 PG (ref 26.5–33)
MCHC RBC AUTO-ENTMCNC: 32.5 G/DL (ref 31.5–36.5)
MCV RBC AUTO: 81 FL (ref 78–100)
NITRATE UR QL: NEGATIVE
NONHDLC SERPL-MCNC: 104 MG/DL
PH UR STRIP: 6 [PH] (ref 5–7)
PHOSPHATE SERPL-MCNC: 2.3 MG/DL (ref 2.5–4.5)
PLATELET # BLD AUTO: 246 10E3/UL (ref 150–450)
POTASSIUM SERPL-SCNC: 4.2 MMOL/L (ref 3.4–5.3)
PROT SERPL-MCNC: 6.6 G/DL (ref 6.4–8.3)
PROT/CREAT 24H UR: 0.12 MG/MG CR (ref 0–0.2)
RBC # BLD AUTO: 5.16 10E6/UL (ref 4.4–5.9)
SIROLIMUS BLD-MCNC: 4.9 UG/L (ref 5–15)
SODIUM SERPL-SCNC: 136 MMOL/L (ref 135–145)
SP GR UR STRIP: 1.01 (ref 1–1.03)
TME LAST DOSE: ABNORMAL H
TME LAST DOSE: ABNORMAL H
TRIGL SERPL-MCNC: 51 MG/DL
UROBILINOGEN UR STRIP-MCNC: NORMAL MG/DL
WBC # BLD AUTO: 6.3 10E3/UL (ref 4–11)

## 2024-10-26 PROCEDURE — 80053 COMPREHEN METABOLIC PANEL: CPT | Performed by: PATHOLOGY

## 2024-10-26 PROCEDURE — 99000 SPECIMEN HANDLING OFFICE-LAB: CPT | Performed by: PATHOLOGY

## 2024-10-26 PROCEDURE — 85027 COMPLETE CBC AUTOMATED: CPT | Performed by: PATHOLOGY

## 2024-10-26 PROCEDURE — 84100 ASSAY OF PHOSPHORUS: CPT | Performed by: PATHOLOGY

## 2024-10-26 PROCEDURE — 82248 BILIRUBIN DIRECT: CPT | Performed by: PATHOLOGY

## 2024-10-26 PROCEDURE — 80195 ASSAY OF SIROLIMUS: CPT | Performed by: INTERNAL MEDICINE

## 2024-10-26 PROCEDURE — G0480 DRUG TEST DEF 1-7 CLASSES: HCPCS | Mod: 90 | Performed by: PATHOLOGY

## 2024-10-26 PROCEDURE — 83735 ASSAY OF MAGNESIUM: CPT | Performed by: PATHOLOGY

## 2024-10-26 PROCEDURE — 36415 COLL VENOUS BLD VENIPUNCTURE: CPT | Performed by: PATHOLOGY

## 2024-10-26 PROCEDURE — 80061 LIPID PANEL: CPT | Performed by: PATHOLOGY

## 2024-10-30 LAB
LABORATORY REPORT: NORMAL
PETH INTERPRETATION: NORMAL
PLPETH BLD-MCNC: <10 NG/ML
POPETH BLD-MCNC: <10 NG/ML

## 2024-11-12 DIAGNOSIS — Z94.0 KIDNEY TRANSPLANTED: ICD-10-CM

## 2024-11-12 DIAGNOSIS — Z94.4 LIVER REPLACED BY TRANSPLANT (H): ICD-10-CM

## 2024-11-12 RX ORDER — SIROLIMUS 1 MG/1
1 TABLET, FILM COATED ORAL DAILY
Qty: 90 TABLET | Refills: 3 | Status: SHIPPED | OUTPATIENT
Start: 2024-11-12

## 2025-01-06 ENCOUNTER — TELEPHONE (OUTPATIENT)
Dept: TRANSPLANT | Facility: CLINIC | Age: 55
End: 2025-01-06
Payer: COMMERCIAL

## 2025-01-06 DIAGNOSIS — Z48.298 AFTERCARE FOLLOWING ORGAN TRANSPLANT: ICD-10-CM

## 2025-01-06 DIAGNOSIS — Z94.4 LIVER REPLACED BY TRANSPLANT (H): Primary | ICD-10-CM

## 2025-01-06 DIAGNOSIS — Z94.0 KIDNEY TRANSPLANTED: Primary | ICD-10-CM

## 2025-01-06 NOTE — PROGRESS NOTES
Jarod tried to schedule an appt to see Dr. Biggs on [employer]. Message received from  that appt request needed to be placed. Appt request placed.

## 2025-01-08 ENCOUNTER — TELEPHONE (OUTPATIENT)
Dept: TRANSPLANT | Facility: CLINIC | Age: 55
End: 2025-01-08
Payer: COMMERCIAL

## 2025-01-08 DIAGNOSIS — Z48.298 AFTERCARE FOLLOWING ORGAN TRANSPLANT: ICD-10-CM

## 2025-01-08 DIAGNOSIS — Z94.0 KIDNEY TRANSPLANTED: Primary | ICD-10-CM

## 2025-03-10 ENCOUNTER — TELEPHONE (OUTPATIENT)
Dept: TRANSPLANT | Facility: CLINIC | Age: 55
End: 2025-03-10
Payer: COMMERCIAL

## 2025-03-10 NOTE — PROGRESS NOTES
Virtual Visit Details    Type of service:  Video Visit     Originating Location (pt. Location): Home    Distant Location (provider location):  On-site  Platform used for Video Visit: [facility]    TRANSPLANT NEPHROLOGY CLINIC VISIT   No changes to immunosuppression (recent low level was due to confusion between 0.5 and 1 mg tablets).  Labs once every 3 months.    Assessment & Plan   # DDKT (K): CKD Stage 2 - Stable   - Baseline Creatinine: ~ 1.2-1.4   - Proteinuria: Normal (<0.2 grams)   - DSA Hx: No DSA   - Last cPRA: unknown   - BK Viremia: Not checked recently due to time from transplant   - Kidney Tx Biopsy Hx: No biopsy history.    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  Transaminases Stable.  Followed by Transplant Surgery.     # Immunosuppression: Mycophenolate mofetil (dose 750 mg every 12 hours) and Sirolimus (goal 4-6)   - Induction with Recent Transplant:  Per Liver/Kidney Tx Protocol   - Continue with intensive monitoring of immunosuppression for efficacy and toxicity.   - Historical Changes in Immunosuppression:  Changed from tacrolimus to sirolimus due to significant neuropathy symptoms.   - Changes: No    # Infection Prevention:   Last CD4 Level:  514 (10/2023)  - PJP: None      - CMV IgG Ab High Risk Discordance (D+/R-) at time of transplant: No  Present CMV Serostatus: Negative  - EBV IgG Ab High Risk Discordance (D+/R-) at time of transplant: No  Present EBV Serostatus: Positive    # Hypertension: Controlled;  Goal BP: < 130/80   - Losartan 100 mg at bedtime, amlodipine 10 mg at bedtime, but amlodipine was decreased to 5 mg every day in March, 2024 due to worsening leg edema.    - Changes: No    # Mineral Bone Disorder:    - Vitamin D; level: Not checked recently, but was high last check        On supplement: No   - Calcium; level: Normal        On supplement: Yes calcium carbonate 500 mg every day.    # Electrolytes:   - Potassium; level:  Normal        On supplement: No  - Bicarbonate; level: Normal        On supplement: No    # Other Significant PMH:   - Erectile dysfunction: using sildenafil citrate 50 mg as needed.      # Skin Cancer Risk: Severely atypical nevus and moderate atypical nevus removed in August, 2024. He saw dermatology yesterday without any new lesions noted. He'll continue 6 month follow ups.    # Transplant History:  Etiology of Kidney Failure: Hepatorenal syndrome (HRS)  Tx: DDKT (SLK) and Liver Tx (SLK)  Transplant: 1/14/2013 (Kidney), 1/14/2013 (Liver)  Significant transplant-related complications: None    Transplant Office Phone Number: [phone]    Assessment and plan was discussed with the patient and he voiced his understanding and agreement.    Return visit: Return in about 1 year (around 3/11/2026).    Jacques Figueroa MD    The longitudinal plan of care for the diagnosis(es)/condition(s) as documented were addressed during this visit. Due to the added complexity in care, I will continue to support Jarod in the subsequent management and with ongoing continuity of care.      Chief Complaint   Mr. Cherelle Quesada is a 54 year old here for kidney transplant and immunosuppression management.     History of Present Illness   Mr. Cherelle Quesada reports feeling great overall.    Since last clinic visit:   Hospitalizations: No   New Medical Issues: No    Activity: working full time. He likes to walk, but doesn't have a specific exercise regimen.   Chest pain or shortness of breath: No  Lower extremity swelling: it's minimal, improved after decreasing amlodipine. Mostly at the end of the day.   Weight change: No  Nausea and vomiting: No  Diarrhea: No  Heartburn symptoms: No  Fever, sweats or chills: No  Night sweats: No  Urinary complaints: No new symptoms.     Home BP:  below 130/85s , not lightheaded with standing.     Problem List   Patient Active Problem List   Diagnosis    Generalized anxiety disorder    Kidney  replaced by transplant    Liver replaced by transplant (H)    Immunosuppressed status    HTN, kidney transplant related    Secondary renal hyperparathyroidism    Aftercare following organ transplant    Status post hip replacement, right    Vitamin D deficiency    CKD (chronic kidney disease) stage 2, GFR 60-89 ml/min    Obesity       Allergies   Allergies   Allergen Reactions    Paxil [Paroxetine] Other (See Comments)     Caused Severe Tremor    Benzocaine (Topical) Rash    Chlorhexidine Rash    Phenol Rash       Medications   Current Outpatient Medications   Medication Sig Dispense Refill    amLODIPine (NORVASC) 10 MG tablet Take 1 tablet (10 mg) by mouth at bedtime 90 tablet 3    aspirin (ASA) 81 MG chewable tablet Take 81 mg by mouth daily      calcium carbonate 500 mg, elemental, (OSCAL 500) 1250 (500 Ca) MG TABS tablet Take by mouth daily      CHOLECALCIFEROL PO Take 5,000 Units by mouth daily       losartan (COZAAR) 100 MG tablet Take 1 tablet (100 mg) by mouth At Bedtime 90 tablet 3    multivitamin w/minerals (THERA-VIT-M) tablet Take 1 tablet by mouth daily      mycophenolate (GENERIC EQUIVALENT) 250 MG capsule Take 3 capsules (750 mg) by mouth 2 times daily 540 capsule 3    sildenafil (VIAGRA) 50 MG tablet Take 0.5-1 tablets (25-50 mg) by mouth daily as needed for erectile dysfunction Take 30 min to 4 hours before intercourse.  Never use with nitroglycerin, terazosin or doxazosin. 12 tablet 11    sirolimus (GENERIC EQUIVALENT) 1 MG tablet Take 1 tablet (1 mg) by mouth daily. 90 tablet 3     No current facility-administered medications for this visit.     There are no discontinued medications.    Physical Exam   Vital Signs: Ht 1.829 m (6')   Wt 93 kg (205 lb)   BMI 27.80 kg/m      GENERAL: alert and no distress  EYES: Eyes grossly normal to inspection.  No discharge or erythema, or obvious scleral/conjunctival abnormalities.  RESP: No audible wheeze, cough, or visible cyanosis.    SKIN: Visible skin  clear. No significant rash, abnormal pigmentation or lesions.  NEURO: Cranial nerves grossly intact.  Mentation and speech appropriate for age.  PSYCH: Appropriate affect, tone, and pace of words      Data         Latest Ref Rng & Units 3/8/2025     7:34 AM 10/26/2024     7:50 AM 1/29/2024     8:20 AM   Renal   Sodium 135 - 145 mmol/L 137  136  136    K 3.4 - 5.3 mmol/L 4.2  4.2  4.0    Cl 98 - 107 mmol/L 103  104  102    Cl (external) 98 - 107 mmol/L 103  104  102    CO2 22 - 29 mmol/L 23  25  24    Urea Nitrogen 6.0 - 20.0 mg/dL 19.3  14.8  14.9    Creatinine 0.67 - 1.17 mg/dL 1.24  1.31  1.22    Glucose 70 - 99 mg/dL 89  103  93    Calcium 8.8 - 10.4 mg/dL 9.3  9.1  9.2    Magnesium 1.7 - 2.3 mg/dL  2.0           Latest Ref Rng & Units 10/26/2024     7:50 AM 1/29/2024     8:20 AM 10/14/2023     8:06 AM   Bone Health   Phosphorus 2.5 - 4.5 mg/dL 2.3  2.3     Vit D Def 20 - 50 ng/mL   66          Latest Ref Rng & Units 3/8/2025     7:34 AM 10/26/2024     7:50 AM 1/29/2024     8:20 AM   Heme   WBC 4.0 - 11.0 10e3/uL 6.1  6.3  6.1    Hgb 13.3 - 17.7 g/dL 14.1  13.6  14.1    Plt 150 - 450 10e3/uL 269  246  236          Latest Ref Rng & Units 3/8/2025     7:34 AM 10/26/2024     7:50 AM 1/29/2024     8:20 AM   Liver   AP 40 - 150 U/L 49  48  44    TBili <=1.2 mg/dL 1.3  1.0  1.3    Bilirubin Direct 0.00 - 0.30 mg/dL 0.48  0.25  0.34    ALT 0 - 70 U/L 41  20  22    AST 0 - 45 U/L 28  24  22    Tot Protein 6.4 - 8.3 g/dL 6.6  6.6  6.4    Albumin 3.5 - 5.2 g/dL 4.1  4.0  4.1          Latest Ref Rng & Units 1/15/2013     4:03 AM 1/13/2013     9:04 PM 1/6/2013     8:20 PM   Pancreas   Amylase 30 - 110 U/L <30  59  116    Lipase 20 - 250 U/L 28   263          Latest Ref Rng & Units 1/8/2013     6:40 AM 10/3/2012     7:45 AM   Iron studies   Iron 35 - 180 ug/dL 15  82    Iron Saturation Index 15 - 46 % 12  70    Ferritin 20 - 300 ng/mL 500           Latest Ref Rng & Units 2/3/2014     8:18 AM 1/21/2014     7:32 AM 1/30/2013      7:58 AM   [facility] Txp Virology   BK Spec    Plasma, EDTA anticoagulant    BK Res <1000 copies/mL   <1000    BK Log <3.0 Log copies/mL   <3.0  The lower limit of detection for this assay is 1000 copies/mL.  Real-time TaqMan   PCR was performed using BK primers and probe for the detection of a 90 bp   portion of the  1 gene.  The performance characteristics were validated by   the [facility] [place].   It has not been cleared or approved by the U.S. Food and Drug Administration.    Hep B Core NEG  Negative     Hep B Surf  1.2        Failed to redirect to the Timeline version of the REVFS SmartLink.      Recent Labs   Lab Test 05/15/17  0741 10/04/18  0820 10/15/18  0748   DOSMPA 8p 5.14.2017 Not Provided Not Provided   MPACID 1.20 0.97* 1.10   MPAG 51.8 70.7 76.6

## 2025-03-10 NOTE — TELEPHONE ENCOUNTER
ISSUE:   Sirolimus level 3.3 on 3/8, goal 4-6, dose 1 mg daily.    PLAN:   Call Patient and confirm this was an accurate 24-hour trough.   Verify Sirolimus dose 1 mg daily.   Confirm no new medications or or missed doses.   Confirm no new illness / infection / diarrhea.   If accurate trough and accurate dose, increase Sirolimus dose to 2 mg daily     Is this more than a 50% increase or decrease in current IS dose: Yes  If YES, justification: level lower than goal    Repeat labs in 1 weeks.  *If > 50% change in immunosuppression dose, repeat labs in 1 week.     OUTCOME:   Spoke with Patient, they confirm accurate trough level and current dose 1 mg daily. He set up meds wrong and was only taking 0.5 mg sirolimus daily, so level was not accurate for the dose he is supposed to be taking.   Patient confirmed  NO dose change .  Patient agreed to repeat labs in 2-3 months.   Orders sent to preferred pharmacy for dose change and lab for repeat labs.   Patient voiced understanding of plan.

## 2025-03-11 ENCOUNTER — VIRTUAL VISIT (OUTPATIENT)
Dept: TRANSPLANT | Facility: CLINIC | Age: 55
End: 2025-03-11
Attending: INTERNAL MEDICINE
Payer: COMMERCIAL

## 2025-03-11 VITALS — WEIGHT: 205 LBS | BODY MASS INDEX: 27.77 KG/M2 | HEIGHT: 72 IN

## 2025-03-11 DIAGNOSIS — I15.1 HTN, KIDNEY TRANSPLANT RELATED: Primary | ICD-10-CM

## 2025-03-11 DIAGNOSIS — Z94.0 KIDNEY TRANSPLANTED: ICD-10-CM

## 2025-03-11 DIAGNOSIS — Z48.298 AFTERCARE FOLLOWING ORGAN TRANSPLANT: ICD-10-CM

## 2025-03-11 DIAGNOSIS — N18.2 CKD (CHRONIC KIDNEY DISEASE) STAGE 2, GFR 60-89 ML/MIN: ICD-10-CM

## 2025-03-11 DIAGNOSIS — E55.9 VITAMIN D DEFICIENCY: ICD-10-CM

## 2025-03-11 DIAGNOSIS — D84.9 IMMUNOSUPPRESSED STATUS: ICD-10-CM

## 2025-03-11 DIAGNOSIS — Z94.0 HTN, KIDNEY TRANSPLANT RELATED: Primary | ICD-10-CM

## 2025-03-11 DIAGNOSIS — Z94.0 KIDNEY REPLACED BY TRANSPLANT: ICD-10-CM

## 2025-03-11 ASSESSMENT — PAIN SCALES - GENERAL: PAINLEVEL_OUTOF10: NO PAIN (0)

## 2025-03-11 NOTE — PATIENT INSTRUCTIONS
Patient Recommendations:  - No new recommendations at this time.    Transplant Patient Information  Your Post Transplant Coordinator is: Bayron Murry  For non urgent items, we encourage you to contact your coordinator/care team online via [employer]  You and your care team can also contact your transplant coordinator Monday - Friday, 8am - 5pm at [phone] (Option 2 to reach the coordinator or Option 4 to schedule an appointment).  After hours for urgent matters, please call [facility] at [phone].

## 2025-03-11 NOTE — LETTER
3/11/2025      Jarod Quesada  [address] [place] [address]      Dear Colleague,    Thank you for referring your patient, Jarod Quesada, to the [facility] TRANSPLANT [facility]INIC. Please see a copy of my visit note below.    Virtual Visit Details    Type of service:  Video Visit     Originating Location (pt. Location): Home    Distant Location (provider location):  On-site  Platform used for Video Visit: [facility]    TRANSPLANT NEPHROLOGY CLINIC VISIT   No changes to immunosuppression (recent low level was due to confusion between 0.5 and 1 mg tablets).  Labs once every 3 months.    Assessment & Plan  # DDKT (K): CKD Stage 2 - Stable   - Baseline Creatinine: ~ 1.2-1.4   - Proteinuria: Normal (<0.2 grams)   - DSA Hx: No DSA   - Last cPRA: unknown   - BK Viremia: Not checked recently due to time from transplant   - Kidney Tx Biopsy Hx: No biopsy history.    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  Transaminases Stable.  Followed by Transplant Surgery.     # Immunosuppression: Mycophenolate mofetil (dose 750 mg every 12 hours) and Sirolimus (goal 4-6)   - Induction with Recent Transplant:  Per Liver/Kidney Tx Protocol   - Continue with intensive monitoring of immunosuppression for efficacy and toxicity.   - Historical Changes in Immunosuppression:  Changed from tacrolimus to sirolimus due to significant neuropathy symptoms.   - Changes: No    # Infection Prevention:   Last CD4 Level:  514 (10/2023)  - PJP: None      - CMV IgG Ab High Risk Discordance (D+/R-) at time of transplant: No  Present CMV Serostatus: Negative  - EBV IgG Ab High Risk Discordance (D+/R-) at time of transplant: No  Present EBV Serostatus: Positive    # Hypertension: Controlled;  Goal BP: < 130/80   - Losartan 100 mg at bedtime, amlodipine 10 mg at bedtime, but amlodipine was decreased to 5 mg every day in March, 2024 due to worsening leg edema.    - Changes:  No    # Mineral Bone Disorder:    - Vitamin D; level: Not checked recently, but was high last check        On supplement: No   - Calcium; level: Normal        On supplement: Yes calcium carbonate 500 mg every day.    # Electrolytes:   - Potassium; level: Normal        On supplement: No  - Bicarbonate; level: Normal        On supplement: No    # Other Significant PMH:   - Erectile dysfunction: using sildenafil citrate 50 mg as needed.      # Skin Cancer Risk: Severely atypical nevus and moderate atypical nevus removed in August, 2024. He saw dermatology yesterday without any new lesions noted. He'll continue 6 month follow ups.    # Transplant History:  Etiology of Kidney Failure: Hepatorenal syndrome (HRS)  Tx: DDKT (K) and Liver Tx (K)  Transplant: 1/14/2013 (Kidney), 1/14/2013 (Liver)  Significant transplant-related complications: None    Transplant Office Phone Number: [phone]    Assessment and plan was discussed with the patient and he voiced his understanding and agreement.    Return visit: Return in about 1 year (around 3/11/2026).    Jacques Figueroa MD    The longitudinal plan of care for the diagnosis(es)/condition(s) as documented were addressed during this visit. Due to the added complexity in care, I will continue to support Jarod in the subsequent management and with ongoing continuity of care.      Chief Complaint  Mr. Cherelle Quesada is a 54 year old here for kidney transplant and immunosuppression management.     History of Present Illness  Mr. Cherelle Quesada reports feeling great overall.    Since last clinic visit:   Hospitalizations: No   New Medical Issues: No    Activity: working full time. He likes to walk, but doesn't have a specific exercise regimen.   Chest pain or shortness of breath: No  Lower extremity swelling: it's minimal, improved after decreasing amlodipine. Mostly at the end of the day.   Weight change: No  Nausea and vomiting: No  Diarrhea: No  Heartburn symptoms:  No  Fever, sweats or chills: No  Night sweats: No  Urinary complaints: No new symptoms.     Home BP:  below 130/85s , not lightheaded with standing.     Problem List  Patient Active Problem List   Diagnosis     Generalized anxiety disorder     Kidney replaced by transplant     Liver replaced by transplant (H)     Immunosuppressed status     HTN, kidney transplant related     Secondary renal hyperparathyroidism     Aftercare following organ transplant     Status post hip replacement, right     Vitamin D deficiency     CKD (chronic kidney disease) stage 2, GFR 60-89 ml/min     Obesity       Allergies  Allergies   Allergen Reactions     Paxil [Paroxetine] Other (See Comments)     Caused Severe Tremor     Benzocaine (Topical) Rash     Chlorhexidine Rash     Phenol Rash       Medications  Current Outpatient Medications   Medication Sig Dispense Refill     amLODIPine (NORVASC) 10 MG tablet Take 1 tablet (10 mg) by mouth at bedtime 90 tablet 3     aspirin (ASA) 81 MG chewable tablet Take 81 mg by mouth daily       calcium carbonate 500 mg, elemental, (OSCAL 500) 1250 (500 Ca) MG TABS tablet Take by mouth daily       CHOLECALCIFEROL PO Take 5,000 Units by mouth daily        losartan (COZAAR) 100 MG tablet Take 1 tablet (100 mg) by mouth At Bedtime 90 tablet 3     multivitamin w/minerals (THERA-VIT-M) tablet Take 1 tablet by mouth daily       mycophenolate (GENERIC EQUIVALENT) 250 MG capsule Take 3 capsules (750 mg) by mouth 2 times daily 540 capsule 3     sildenafil (VIAGRA) 50 MG tablet Take 0.5-1 tablets (25-50 mg) by mouth daily as needed for erectile dysfunction Take 30 min to 4 hours before intercourse.  Never use with nitroglycerin, terazosin or doxazosin. 12 tablet 11     sirolimus (GENERIC EQUIVALENT) 1 MG tablet Take 1 tablet (1 mg) by mouth daily. 90 tablet 3     No current facility-administered medications for this visit.     There are no discontinued medications.    Physical Exam  Vital Signs: Ht 1.829 m (6')    Wt 93 kg (205 lb)   BMI 27.80 kg/m      GENERAL: alert and no distress  EYES: Eyes grossly normal to inspection.  No discharge or erythema, or obvious scleral/conjunctival abnormalities.  RESP: No audible wheeze, cough, or visible cyanosis.    SKIN: Visible skin clear. No significant rash, abnormal pigmentation or lesions.  NEURO: Cranial nerves grossly intact.  Mentation and speech appropriate for age.  PSYCH: Appropriate affect, tone, and pace of words      Data        Latest Ref Rng & Units 3/8/2025     7:34 AM 10/26/2024     7:50 AM 1/29/2024     8:20 AM   Renal   Sodium 135 - 145 mmol/L 137  136  136    K 3.4 - 5.3 mmol/L 4.2  4.2  4.0    Cl 98 - 107 mmol/L 103  104  102    Cl (external) 98 - 107 mmol/L 103  104  102    CO2 22 - 29 mmol/L 23  25  24    Urea Nitrogen 6.0 - 20.0 mg/dL 19.3  14.8  14.9    Creatinine 0.67 - 1.17 mg/dL 1.24  1.31  1.22    Glucose 70 - 99 mg/dL 89  103  93    Calcium 8.8 - 10.4 mg/dL 9.3  9.1  9.2    Magnesium 1.7 - 2.3 mg/dL  2.0           Latest Ref Rng & Units 10/26/2024     7:50 AM 1/29/2024     8:20 AM 10/14/2023     8:06 AM   Bone Health   Phosphorus 2.5 - 4.5 mg/dL 2.3  2.3     Vit D Def 20 - 50 ng/mL   66          Latest Ref Rng & Units 3/8/2025     7:34 AM 10/26/2024     7:50 AM 1/29/2024     8:20 AM   Heme   WBC 4.0 - 11.0 10e3/uL 6.1  6.3  6.1    Hgb 13.3 - 17.7 g/dL 14.1  13.6  14.1    Plt 150 - 450 10e3/uL 269  246  236          Latest Ref Rng & Units 3/8/2025     7:34 AM 10/26/2024     7:50 AM 1/29/2024     8:20 AM   Liver   AP 40 - 150 U/L 49  48  44    TBili <=1.2 mg/dL 1.3  1.0  1.3    Bilirubin Direct 0.00 - 0.30 mg/dL 0.48  0.25  0.34    ALT 0 - 70 U/L 41  20  22    AST 0 - 45 U/L 28  24  22    Tot Protein 6.4 - 8.3 g/dL 6.6  6.6  6.4    Albumin 3.5 - 5.2 g/dL 4.1  4.0  4.1          Latest Ref Rng & Units 1/15/2013     4:03 AM 1/13/2013     9:04 PM 1/6/2013     8:20 PM   Pancreas   Amylase 30 - 110 U/L <30  59  116    Lipase 20 - 250 U/L 28   263          Latest  Ref Rng & Units 1/8/2013     6:40 AM 10/3/2012     7:45 AM   Iron studies   Iron 35 - 180 ug/dL 15  82    Iron Saturation Index 15 - 46 % 12  70    Ferritin 20 - 300 ng/mL 500           Latest Ref Rng & Units 2/3/2014     8:18 AM 1/21/2014     7:32 AM 1/30/2013     7:58 AM   [facility] Vi[facility]   BK Spec    Plasma, EDTA anticoagulant    BK Res <1000 copies/mL   <1000    BK Log <3.0 Log copies/mL   <3.0  The lower limit of detection for this assay is 1000 copies/mL.  Real-time TaqMan   PCR was performed using BK primers and probe for the detection of a 90 bp   portion of the  1 gene.  The performance characteristics were validated by   the [facility].   It has not been cleared or approved by the U.S. Food and Drug Administration.    Hep B Core NEG  Negative     Hep B Surf  1.2        Failed to redirect to the Timeline version of the REVFS SmartLink.      Recent Labs   Lab Test 05/15/17  0741 10/04/18  0820 10/15/18  0748   DOSMPA 8p 5.14.2017 Not Provided Not Provided   MPACID 1.20 0.97* 1.10   MPAG 51.8 70.7 76.6       Again, thank you for allowing me to participate in the care of your patient.        Sincerely,        Jacques Figueroa MD    Electronically signed

## 2025-03-11 NOTE — NURSING NOTE
Current patient location: Patient declined to provide     Is the patient currently in the state of [place]? YES    Visit mode: VIDEO    If the visit is dropped, the patient can be reconnected by:VIDEO VISIT: Text to cell phone:   Telephone Information:   Mobile [phone]       Will anyone else be joining the visit? NO  (If patient encounters technical issues they should call [phone])    Are changes needed to the allergy or medication list? No    Are refills needed on medications prescribed by this physician? NO    Rooming Documentation:  Questionnaire(s) completed    Reason for visit: RECHECK (RKT)    Ekaterina CHARLTON

## 2025-04-23 DIAGNOSIS — Z94.0 KIDNEY REPLACED BY TRANSPLANT: ICD-10-CM

## 2025-04-23 RX ORDER — MYCOPHENOLATE MOFETIL 250 MG/1
750 CAPSULE ORAL 2 TIMES DAILY
Qty: 540 CAPSULE | Refills: 3 | Status: SHIPPED | OUTPATIENT
Start: 2025-04-23

## 2025-05-11 ENCOUNTER — HEALTH MAINTENANCE LETTER (OUTPATIENT)
Age: 55
End: 2025-05-11

## 2025-07-02 ENCOUNTER — TELEPHONE (OUTPATIENT)
Dept: GASTROENTEROLOGY | Facility: CLINIC | Age: 55
End: 2025-07-02
Payer: COMMERCIAL

## 2025-07-02 NOTE — TELEPHONE ENCOUNTER
Was the patient contacted by phone and reminded of the upcoming visit?  Confirmed video visit    Were ordered labs and tests completed prior to the appointment?  Due for transplant labs, he will make lab appointment and have drawn prior to visit.     Were the needed  orders placed? Yes    Tammi Sauceda, [facility]  7/2/2025 12:23 PM

## 2025-07-12 ENCOUNTER — LAB (OUTPATIENT)
Dept: LAB | Facility: CLINIC | Age: 55
End: 2025-07-12
Payer: COMMERCIAL

## 2025-07-12 DIAGNOSIS — Z94.4 LIVER REPLACED BY TRANSPLANT (H): ICD-10-CM

## 2025-07-12 LAB
ALBUMIN SERPL BCG-MCNC: 3.9 G/DL (ref 3.5–5.2)
ALP SERPL-CCNC: 47 U/L (ref 40–150)
ALT SERPL W P-5'-P-CCNC: 22 U/L (ref 0–70)
ANION GAP SERPL CALCULATED.3IONS-SCNC: 10 MMOL/L (ref 7–15)
AST SERPL W P-5'-P-CCNC: 22 U/L (ref 0–45)
BILIRUB SERPL-MCNC: 0.9 MG/DL
BILIRUBIN DIRECT (ROCHE PRO & PURE): 0.37 MG/DL (ref 0–0.45)
BUN SERPL-MCNC: 21.3 MG/DL (ref 6–20)
CALCIUM SERPL-MCNC: 9 MG/DL (ref 8.8–10.4)
CHLORIDE SERPL-SCNC: 106 MMOL/L (ref 98–107)
CREAT SERPL-MCNC: 1.24 MG/DL (ref 0.67–1.17)
EGFRCR SERPLBLD CKD-EPI 2021: 69 ML/MIN/1.73M2
ERYTHROCYTE [DISTWIDTH] IN BLOOD BY AUTOMATED COUNT: 13 % (ref 10–15)
GLUCOSE SERPL-MCNC: 87 MG/DL (ref 70–99)
HCO3 SERPL-SCNC: 24 MMOL/L (ref 22–29)
HCT VFR BLD AUTO: 41.6 % (ref 40–53)
HGB BLD-MCNC: 13.4 G/DL (ref 13.3–17.7)
MCH RBC QN AUTO: 26.3 PG (ref 26.5–33)
MCHC RBC AUTO-ENTMCNC: 32.2 G/DL (ref 31.5–36.5)
MCV RBC AUTO: 82 FL (ref 78–100)
PLATELET # BLD AUTO: 267 10E3/UL (ref 150–450)
POTASSIUM SERPL-SCNC: 4.2 MMOL/L (ref 3.4–5.3)
PROT SERPL-MCNC: 6.1 G/DL (ref 6.4–8.3)
RBC # BLD AUTO: 5.1 10E6/UL (ref 4.4–5.9)
SIROLIMUS BLD-MCNC: 10.8 UG/L (ref 5–15)
SODIUM SERPL-SCNC: 140 MMOL/L (ref 135–145)
TME LAST DOSE: NORMAL H
TME LAST DOSE: NORMAL H
WBC # BLD AUTO: 5.2 10E3/UL (ref 4–11)

## 2025-07-12 PROCEDURE — 80195 ASSAY OF SIROLIMUS: CPT | Performed by: INTERNAL MEDICINE

## 2025-07-12 PROCEDURE — 36415 COLL VENOUS BLD VENIPUNCTURE: CPT | Performed by: PATHOLOGY

## 2025-07-12 PROCEDURE — 82248 BILIRUBIN DIRECT: CPT | Performed by: PATHOLOGY

## 2025-07-12 PROCEDURE — 99000 SPECIMEN HANDLING OFFICE-LAB: CPT | Performed by: PATHOLOGY

## 2025-07-12 PROCEDURE — 85027 COMPLETE CBC AUTOMATED: CPT | Performed by: PATHOLOGY

## 2025-07-12 PROCEDURE — 80053 COMPREHEN METABOLIC PANEL: CPT | Performed by: PATHOLOGY

## 2025-07-14 ENCOUNTER — VIRTUAL VISIT (OUTPATIENT)
Dept: GASTROENTEROLOGY | Facility: CLINIC | Age: 55
End: 2025-07-14
Attending: INTERNAL MEDICINE
Payer: COMMERCIAL

## 2025-07-14 DIAGNOSIS — Z94.4 LIVER REPLACED BY TRANSPLANT (H): ICD-10-CM

## 2025-07-14 ASSESSMENT — PAIN SCALES - GENERAL: PAINLEVEL_OUTOF10: NO PAIN (0)

## 2025-07-14 NOTE — PROGRESS NOTES
"Virtual Visit Details    Type of service:  Video Visit     Originating Location (pt. Location): {video visit patient location:032692::\"Home\"}  {PROVIDER LOCATION On-site should be selected for visits conducted from your clinic location or adjoining [facility] hospital, academic office, or other nearby [facility] building. Off-site should be selected for all other provider locations, including home:225032}  Distant Location (provider location):  {virtual location provider:951767}  Platform used for Video Visit: {Virtual Visit Platforms:864081::\"Trampoline\"}  "

## 2025-07-14 NOTE — LETTER
7/14/2025      Jarod Quesada  [address] [place] [address]      Dear Colleague,    Thank you for referring your patient, Jarod Quesada, to the [facility] HEPATOLOGY CLINIC [place]. Please see a copy of my visit note below.    [facility]  LIVER TRANSPLANT CLINIC FOLLOW UP       A/P  Mr. Quesada is a 55 Y M s/p SLK 1/14/13 for alcohol related cirrhosis  Medically doing extremely well.    IS Sirolimus and MMF. SRL level was 10.8 from labs 2 d ago. He is certain this is a trough. Last dose change was 10/2024. Will need a recheck.    Graft function Excellent. Labs up to date and normal/stable.  HTN on cozaar and amlodipine. Mgmt per Dr. Timmons.    VACCINATIONS  Discussed recommendations to stay up to date on vaccinations including  -INFLUENZA recommended yearly  -COVID recommended to have updated booster as appropriate  -PNEUMOCOCCAL  -HEP A   -HEP B   -ZOSTER Shingrix   CANCER SCREENING  -Skin cancer: discussed increased risk due to IS. Rec sunscreen, protect skin and yearly derm visit. UTD on derm (going every 6 m)  -CRC: Colonoscopy 7/6/20. Lymphoid aggregate. Due 2030.  -Lung cancer: NA, nonsmoker    Return to clinic in 12 months. In person or via video. I let patient know if they would like to see me in person and they are told there are no appointments available or that we are booking out too far, they should send me a message and I will always find a time to see the patient in person if preferred/desired/needed.      Jeannine Biggs MD  Hepatology/ Liver Transplant  [facility]  ==================================================================  PCP: Dr. Gil at [facility].        SUBJECTIVE  Mr. Quesada is 55 Y M s/p SLK 1/14/13 for alcohol related cirrhosis.    No changes in his health. He notes his BUN is up a little bit from past.   Work has been stressful and he may not be keeping as well hydrated.    [place] this [place].    BP has been a little high and Dr. Timmons made adjustments to his meds.  EXPLANT: Cirrhosis, no HCC  IS: SRL and MMF  LABS: Up to date and normal liver tests and CBC    Liver Function Studies -   Recent Labs   Lab Test 07/12/25  0718   PROTTOTAL 6.1*   ALBUMIN 3.9   BILITOTAL 0.9   ALKPHOS 47   AST 22   ALT 22     CBC RESULTS:   Recent Labs   Lab Test 07/12/25  0718   WBC 5.2   RBC 5.10   HGB 13.4   HCT 41.6   MCV 82   MCH 26.3*   MCHC 32.2   RDW 13.0      REJECTION: None.  BILIARY ISSUES: None  STENT: No stent on abdominal imaging post transpalnt  KIDNEY FUNCTION: Sees Dr. Emersno. Stable. No proteinuria          Creatinine   Date Value Ref Range Status   07/12/2025 1.24 (H) 0.67 - 1.17 mg/dL Final   06/18/2021 1.36 (H) 0.66 - 1.25 mg/dL Final     BP: On amlodipine, losartan. Checks at home in the 120s/70s.   PREV: colonoscopy 6/29/2012 no polyps, area of ulceration. Path showed nonspecific changes. Record is located in Media tab on 9/21/12    DISEASE RECURRENCE: No alcohol  OTHER ISSUES:   Struggles with weight gain.   Neuropathy, on gabapentin in the past  HLD  Incisional hernia not worse  NEW ISSUES:  R ROBERTA with Dr. Scott here in early November 2018.     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  Dtr Fela graduated and is living in [place] working at [employer].   Son Bayron was in [facility] [facility] for a year and now at [employer].  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  Exam  Gen Alert pleasant NAD  Resp No difficulty breathing. No cough  Skin No Jaundice  Eyes No icterus  Neuro STOUT  MSK no muscle wasting  Psyche Pleasant, appropriate. Well groomed.    Current Outpatient Medications   Medication Sig Dispense Refill     amLODIPine (NORVASC) 10 MG tablet Take 1 tablet (10 mg) by mouth at bedtime 90 tablet 3     aspirin (ASA) 81 MG chewable tablet Take 81 mg by mouth daily       calcium carbonate 500 mg,  elemental, (OSCAL 500) 1250 (500 Ca) MG TABS tablet Take by mouth daily       CHOLECALCIFEROL PO Take 5,000 Units by mouth daily        losartan (COZAAR) 100 MG tablet Take 1 tablet (100 mg) by mouth At Bedtime 90 tablet 3     multivitamin w/minerals (THERA-VIT-M) tablet Take 1 tablet by mouth daily       mycophenolate (GENERIC EQUIVALENT) 250 MG capsule Take 3 capsules (750 mg) by mouth 2 times daily. 540 capsule 3     sildenafil (VIAGRA) 50 MG tablet Take 0.5-1 tablets (25-50 mg) by mouth daily as needed for erectile dysfunction Take 30 min to 4 hours before intercourse.  Never use with nitroglycerin, terazosin or doxazosin. 12 tablet 11     sirolimus (GENERIC EQUIVALENT) 1 MG tablet Take 1 tablet (1 mg) by mouth daily. 90 tablet 3     No current facility-administered medications for this visit.         Jarod Quesada is a 55 year old male who is being evaluated via a billable video visit.    Video-Visit Details  Type of service:  Video Visit  Video Start Time: 1011  Video End Time: 1024  Originating Location (pt. Location):home  Distant Location (provider location):  [facility] HEPATOLOGY CLINIC [place]      Platform used for Video Visit: [employer] or [employer]    The longitudinal plan of care for the diagnosis(es)/condition(s) as documented were addressed during this visit. Due to the added complexity in care, I will continue to support patient in the subsequent management and with ongoing continuity of care.              Again, thank you for allowing me to participate in the care of your patient.        Sincerely,        Jeannine Biggs MD    Electronically signed

## 2025-07-14 NOTE — NURSING NOTE
Current patient location: WORK    Is the patient currently in the state [facility]? YES    Visit mode: VIDEO    If the visit is dropped, the patient can be reconnected by:VIDEO VISIT: Text to cell phone:   Telephone Information:   Mobile [phone]       Will anyone else be joining the visit? NO  (If patient encounters technical issues they should call [phone])    Are changes needed to the allergy or medication list? Pt stated no changes to allergies and Pt stated no med changes    Are refills needed on medications prescribed by this physician? NO    Rooming Documentation:  Questionnaire(s) completed    Reason for visit: RECHECK    Leann SULLIVANF

## 2025-07-14 NOTE — PROGRESS NOTES
[facility]  LIVER TRANSPLANT CLINIC FOLLOW UP       A/P  Mr. Quesada is a 55 Y M s/p SLK 1/14/13 for alcohol related cirrhosis  Medically doing extremely well.    IS Sirolimus and MMF. SRL level was 10.8 from labs 2 d ago. He is certain this is a trough. Last dose change was 10/2024. Will need a recheck.    Graft function Excellent. Labs up to date and normal/stable.  HTN on cozaar and amlodipine. Mgmt per Dr. Timmons.    VACCINATIONS  Discussed recommendations to stay up to date on vaccinations including  -INFLUENZA recommended yearly  -COVID recommended to have updated booster as appropriate  -PNEUMOCOCCAL  -HEP A   -HEP B   -ZOSTER Shingrix   CANCER SCREENING  -Skin cancer: discussed increased risk due to IS. Rec sunscreen, protect skin and yearly derm visit. UTD on derm (going every 6 m)  -CRC: Colonoscopy 7/6/20. Lymphoid aggregate. Due 2030.  -Lung cancer: NA, nonsmoker    Return to clinic in 12 months. In person or via video. I let patient know if they would like to see me in person and they are told there are no appointments available or that we are booking out too far, they should send me a message and I will always find a time to see the patient in person if preferred/desired/needed.      Jeannine Biggs MD  Hepatology/ Liver Transplant  [facility]  ==================================================================  PCP: Dr. Gil at [facility].        SUBJECTIVE  Mr. Quesada is 55 Y M s/p SLK 1/14/13 for alcohol related cirrhosis.    No changes in his health. He notes his BUN is up a little bit from past.   Work has been stressful and he may not be keeping as well hydrated.    He took a [place] cruise [place] this [place].    BP has been a little high and Dr. Timmons made adjustments to his meds.  EXPLANT: Cirrhosis, no HCC  IS: SRL and MMF  LABS: Up to date and normal liver tests and CBC    Liver Function Studies -   Recent Labs   Lab Test 07/12/25  0718   PROTTOTAL  6.1*   ALBUMIN 3.9   BILITOTAL 0.9   ALKPHOS 47   AST 22   ALT 22     CBC RESULTS:   Recent Labs   Lab Test 07/12/25  0718   WBC 5.2   RBC 5.10   HGB 13.4   HCT 41.6   MCV 82   MCH 26.3*   MCHC 32.2   RDW 13.0      REJECTION: None.  BILIARY ISSUES: None  STENT: No stent on abdominal imaging post transpalnt  KIDNEY FUNCTION: Sees Dr. Emerson. Stable. No proteinuria          Creatinine   Date Value Ref Range Status   07/12/2025 1.24 (H) 0.67 - 1.17 mg/dL Final   06/18/2021 1.36 (H) 0.66 - 1.25 mg/dL Final     BP: On amlodipine, losartan. Checks at home in the 120s/70s.   PREV: colonoscopy 6/29/2012 no polyps, area of ulceration. Path showed nonspecific changes. Record is located in Media tab on 9/21/12    DISEASE RECURRENCE: No alcohol  OTHER ISSUES:   Struggles with weight gain.   Neuropathy, on gabapentin in the past  HLD  Incisional hernia not worse  NEW ISSUES:  R ROBERTA with Dr. Scott here in early November 2018.     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  Dtr Fela graduated and is living in [place] working at [employer].   Son Bayron was in [facility] for a year and now at [employer].  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  Exam  Gen Alert pleasant NAD  Resp No difficulty breathing. No cough  Skin No Jaundice  Eyes No icterus  Neuro STOUT  MSK no muscle wasting  Psyche Pleasant, appropriate. Well groomed.    Current Outpatient Medications   Medication Sig Dispense Refill    amLODIPine (NORVASC) 10 MG tablet Take 1 tablet (10 mg) by mouth at bedtime 90 tablet 3    aspirin (ASA) 81 MG chewable tablet Take 81 mg by mouth daily      calcium carbonate 500 mg, elemental, (OSCAL 500) 1250 (500 Ca) MG TABS tablet Take by mouth daily      CHOLECALCIFEROL PO Take 5,000 Units by mouth daily       losartan (COZAAR) 100 MG tablet Take 1 tablet (100 mg) by mouth At Bedtime 90 tablet 3    multivitamin w/minerals (THERA-VIT-M) tablet  Take 1 tablet by mouth daily      mycophenolate (GENERIC EQUIVALENT) 250 MG capsule Take 3 capsules (750 mg) by mouth 2 times daily. 540 capsule 3    sildenafil (VIAGRA) 50 MG tablet Take 0.5-1 tablets (25-50 mg) by mouth daily as needed for erectile dysfunction Take 30 min to 4 hours before intercourse.  Never use with nitroglycerin, terazosin or doxazosin. 12 tablet 11    sirolimus (GENERIC EQUIVALENT) 1 MG tablet Take 1 tablet (1 mg) by mouth daily. 90 tablet 3     No current facility-administered medications for this visit.         Jarod Quesada is a 55 year old male who is being evaluated via a billable video visit.    Video-Visit Details  Type of service:  Video Visit  Video Start Time: 1011  Video End Time: 1024  Originating Location (pt. Location):home  Distant Location (provider location):  [facility] HEPATOLOGY CLINIC [place]      Platform used for Video Visit: [employer] or [employer]    The longitudinal plan of care for the diagnosis(es)/condition(s) as documented were addressed during this visit. Due to the added complexity in care, I will continue to support patient in the subsequent management and with ongoing continuity of care.

## (undated) DEVICE — PACK TOTAL HIP W/POUCH RIVERSIDE LATEX FREE

## (undated) DEVICE — DRAPE SLEEVE 599

## (undated) DEVICE — SU STRATAFIX PDS PLUS 1 CT-1 18" SXPP1A404

## (undated) DEVICE — GOWN XLG DISP 9545

## (undated) DEVICE — SOL NACL 0.9% IRRIG 1000ML BOTTLE 2F7124

## (undated) DEVICE — SU MONOCRYL 3-0 PS-1 27" Y936H

## (undated) DEVICE — LINEN BACK PACK 5440

## (undated) DEVICE — GLOVE PROTEXIS POWDER FREE 8.0 ORTHOPEDIC 2D73ET80

## (undated) DEVICE — LINEN MAYO STAND COVER OVERSIZE PACK 5458

## (undated) DEVICE — SU STRATAFIXÂ PDO 2-0 24CMX24CM MH DA SXPD2B408

## (undated) DEVICE — HOOD FLYTE W/PEELAWAY 408-800-100

## (undated) DEVICE — LINEN TOWEL PACK X5 5464

## (undated) DEVICE — SOL WATER IRRIG 1000ML BOTTLE 2F7114

## (undated) DEVICE — SPONGE LAP 18X18" X8435

## (undated) DEVICE — SUCTION MANIFOLD DORNOCH ULTRA CART UL-CL500

## (undated) DEVICE — SU DERMABOND ADVANCED .7ML DNX12

## (undated) DEVICE — STRAP KNEE/BODY 31143004

## (undated) DEVICE — BLADE SAW SAGITTAL STRK 18X90X1.37MM HD SYS 6 6118-137-090

## (undated) DEVICE — ESU GROUND PAD ADULT W/CORD E7507

## (undated) DEVICE — PREP CHLORAPREP 26ML TINTED ORANGE  260815

## (undated) DEVICE — SOL NACL 0.9% IRRIG 3000ML BAG 2B7477

## (undated) DEVICE — Device

## (undated) DEVICE — EYE PREP BETADINE 5% SOLUTION 30ML 0065-0411-30

## (undated) DEVICE — SUCTION IRR SYSTEM W/O TIP INTERPULSE HANDPIECE 0210-100-000

## (undated) DEVICE — GLOVE PROTEXIS BLUE W/NEU-THERA 8.5  2D73EB85

## (undated) DEVICE — LINEN GOWN X4 5410

## (undated) DEVICE — DRAPE IOBAN INCISE 36X23" 6651EZ

## (undated) DEVICE — DRSG AQUACEL AG 3.5X9.75" HYDROFIBER 412011

## (undated) DEVICE — NDL SPINAL 20GA 3.5" 405182

## (undated) DEVICE — SU NDL MAYO 1824-2

## (undated) DEVICE — SU PDS II 0 CT-1 27" Z340H

## (undated) DEVICE — STRAP STIRRUP W/SLIP 30187-030

## (undated) DEVICE — SU ETHIBOND 2 V-37 4X30" MX69G

## (undated) DEVICE — BONE CLEANING TIP INTERPULSE  0210-010-000

## (undated) DEVICE — SYR 20ML LL W/O NDL 302830

## (undated) RX ORDER — PROPOFOL 10 MG/ML
INJECTION, EMULSION INTRAVENOUS
Status: DISPENSED
Start: 2018-10-30

## (undated) RX ORDER — CEFAZOLIN SODIUM 1 G/3ML
INJECTION, POWDER, FOR SOLUTION INTRAMUSCULAR; INTRAVENOUS
Status: DISPENSED
Start: 2018-10-30

## (undated) RX ORDER — FENTANYL CITRATE 50 UG/ML
INJECTION, SOLUTION INTRAMUSCULAR; INTRAVENOUS
Status: DISPENSED
Start: 2018-10-30

## (undated) RX ORDER — ONDANSETRON 2 MG/ML
INJECTION INTRAMUSCULAR; INTRAVENOUS
Status: DISPENSED
Start: 2018-10-30

## (undated) RX ORDER — ACETAMINOPHEN 325 MG/1
TABLET ORAL
Status: DISPENSED
Start: 2018-10-30

## (undated) RX ORDER — PHENYLEPHRINE HCL IN 0.9% NACL 1 MG/10 ML
SYRINGE (ML) INTRAVENOUS
Status: DISPENSED
Start: 2018-10-30

## (undated) RX ORDER — CELECOXIB 200 MG/1
CAPSULE ORAL
Status: DISPENSED
Start: 2018-10-30

## (undated) RX ORDER — LIDOCAINE HYDROCHLORIDE 20 MG/ML
INJECTION, SOLUTION EPIDURAL; INFILTRATION; INTRACAUDAL; PERINEURAL
Status: DISPENSED
Start: 2018-10-30

## (undated) RX ORDER — CEFAZOLIN SODIUM IN 0.9 % NACL 3 G/100 ML
INTRAVENOUS SOLUTION, PIGGYBACK (ML) INTRAVENOUS
Status: DISPENSED
Start: 2018-10-30

## (undated) RX ORDER — HYDROMORPHONE HYDROCHLORIDE 1 MG/ML
INJECTION, SOLUTION INTRAMUSCULAR; INTRAVENOUS; SUBCUTANEOUS
Status: DISPENSED
Start: 2018-10-30

## (undated) RX ORDER — LABETALOL HYDROCHLORIDE 5 MG/ML
INJECTION, SOLUTION INTRAVENOUS
Status: DISPENSED
Start: 2018-10-30

## (undated) RX ORDER — EPHEDRINE SULFATE 50 MG/ML
INJECTION, SOLUTION INTRAMUSCULAR; INTRAVENOUS; SUBCUTANEOUS
Status: DISPENSED
Start: 2018-10-30

## (undated) RX ORDER — HYDRALAZINE HYDROCHLORIDE 20 MG/ML
INJECTION INTRAMUSCULAR; INTRAVENOUS
Status: DISPENSED
Start: 2018-10-30

## (undated) RX ORDER — GLYCOPYRROLATE 0.2 MG/ML
INJECTION, SOLUTION INTRAMUSCULAR; INTRAVENOUS
Status: DISPENSED
Start: 2018-10-30

## (undated) RX ORDER — DEXAMETHASONE SODIUM PHOSPHATE 4 MG/ML
INJECTION, SOLUTION INTRA-ARTICULAR; INTRALESIONAL; INTRAMUSCULAR; INTRAVENOUS; SOFT TISSUE
Status: DISPENSED
Start: 2018-10-30